# Patient Record
Sex: FEMALE | Race: WHITE | NOT HISPANIC OR LATINO | Employment: FULL TIME | URBAN - METROPOLITAN AREA
[De-identification: names, ages, dates, MRNs, and addresses within clinical notes are randomized per-mention and may not be internally consistent; named-entity substitution may affect disease eponyms.]

---

## 2023-04-24 ENCOUNTER — OFFICE VISIT (OUTPATIENT)
Dept: PRIMARY CARE CLINIC | Facility: CLINIC | Age: 23
End: 2023-04-24
Payer: COMMERCIAL

## 2023-04-24 ENCOUNTER — LAB VISIT (OUTPATIENT)
Dept: LAB | Facility: HOSPITAL | Age: 23
End: 2023-04-24
Attending: STUDENT IN AN ORGANIZED HEALTH CARE EDUCATION/TRAINING PROGRAM
Payer: COMMERCIAL

## 2023-04-24 VITALS
WEIGHT: 157.19 LBS | DIASTOLIC BLOOD PRESSURE: 74 MMHG | HEART RATE: 67 BPM | HEIGHT: 68 IN | BODY MASS INDEX: 23.82 KG/M2 | OXYGEN SATURATION: 99 % | SYSTOLIC BLOOD PRESSURE: 120 MMHG | TEMPERATURE: 98 F

## 2023-04-24 DIAGNOSIS — M25.542 ARTHRALGIA OF BOTH HANDS: Chronic | ICD-10-CM

## 2023-04-24 DIAGNOSIS — G43.009 MIGRAINE WITHOUT AURA AND WITHOUT STATUS MIGRAINOSUS, NOT INTRACTABLE: Chronic | ICD-10-CM

## 2023-04-24 DIAGNOSIS — R41.89 BRAIN FOG: ICD-10-CM

## 2023-04-24 DIAGNOSIS — M25.541 ARTHRALGIA OF BOTH HANDS: Chronic | ICD-10-CM

## 2023-04-24 DIAGNOSIS — G50.0 TRIGEMINAL NEURALGIA: ICD-10-CM

## 2023-04-24 DIAGNOSIS — A69.20 LYME DISEASE: ICD-10-CM

## 2023-04-24 DIAGNOSIS — L40.9 PSORIASIS: Chronic | ICD-10-CM

## 2023-04-24 DIAGNOSIS — F32.A ANXIETY AND DEPRESSION: ICD-10-CM

## 2023-04-24 DIAGNOSIS — Z76.89 ESTABLISHING CARE WITH NEW DOCTOR, ENCOUNTER FOR: ICD-10-CM

## 2023-04-24 DIAGNOSIS — R21 RASH AND NONSPECIFIC SKIN ERUPTION: ICD-10-CM

## 2023-04-24 DIAGNOSIS — Z76.89 ESTABLISHING CARE WITH NEW DOCTOR, ENCOUNTER FOR: Primary | ICD-10-CM

## 2023-04-24 DIAGNOSIS — F41.9 ANXIETY AND DEPRESSION: ICD-10-CM

## 2023-04-24 DIAGNOSIS — R11.2 NAUSEA AND VOMITING, UNSPECIFIED VOMITING TYPE: ICD-10-CM

## 2023-04-24 DIAGNOSIS — Z83.2 FAMILY HISTORY OF AUTOIMMUNE DISORDER: ICD-10-CM

## 2023-04-24 DIAGNOSIS — M25.50 ARTHRALGIA, UNSPECIFIED JOINT: ICD-10-CM

## 2023-04-24 DIAGNOSIS — Z79.899 OTHER LONG TERM (CURRENT) DRUG THERAPY: ICD-10-CM

## 2023-04-24 DIAGNOSIS — A69.23 LYME ARTHRITIS: ICD-10-CM

## 2023-04-24 DIAGNOSIS — L40.50 PSORIATIC ARTHRITIS: ICD-10-CM

## 2023-04-24 DIAGNOSIS — Z86.19 H/O LYME DISEASE: ICD-10-CM

## 2023-04-24 LAB
25(OH)D3+25(OH)D2 SERPL-MCNC: 28 NG/ML (ref 30–96)
ALBUMIN SERPL BCP-MCNC: 4.3 G/DL (ref 3.5–5.2)
ALP SERPL-CCNC: 53 U/L (ref 55–135)
ALT SERPL W/O P-5'-P-CCNC: 14 U/L (ref 10–44)
ANION GAP SERPL CALC-SCNC: 7 MMOL/L (ref 8–16)
AST SERPL-CCNC: 15 U/L (ref 10–40)
BASOPHILS # BLD AUTO: 0.05 K/UL (ref 0–0.2)
BASOPHILS NFR BLD: 0.8 % (ref 0–1.9)
BILIRUB SERPL-MCNC: 0.3 MG/DL (ref 0.1–1)
BUN SERPL-MCNC: 15 MG/DL (ref 6–20)
CALCIUM SERPL-MCNC: 9.8 MG/DL (ref 8.7–10.5)
CCP AB SER IA-ACNC: <0.5 U/ML
CHLORIDE SERPL-SCNC: 106 MMOL/L (ref 95–110)
CHOLEST SERPL-MCNC: 182 MG/DL (ref 120–199)
CHOLEST/HDLC SERPL: 2.6 {RATIO} (ref 2–5)
CO2 SERPL-SCNC: 26 MMOL/L (ref 23–29)
CREAT SERPL-MCNC: 1 MG/DL (ref 0.5–1.4)
DIFFERENTIAL METHOD: ABNORMAL
EOSINOPHIL # BLD AUTO: 0.2 K/UL (ref 0–0.5)
EOSINOPHIL NFR BLD: 3.3 % (ref 0–8)
ERYTHROCYTE [DISTWIDTH] IN BLOOD BY AUTOMATED COUNT: 12.1 % (ref 11.5–14.5)
EST. GFR  (NO RACE VARIABLE): >60 ML/MIN/1.73 M^2
ESTIMATED AVG GLUCOSE: 100 MG/DL (ref 68–131)
GLUCOSE SERPL-MCNC: 87 MG/DL (ref 70–110)
HBA1C MFR BLD: 5.1 % (ref 4–5.6)
HCT VFR BLD AUTO: 40.5 % (ref 37–48.5)
HCV AB SERPL QL IA: NORMAL
HDLC SERPL-MCNC: 69 MG/DL (ref 40–75)
HDLC SERPL: 37.9 % (ref 20–50)
HGB BLD-MCNC: 13.4 G/DL (ref 12–16)
HIV 1+2 AB+HIV1 P24 AG SERPL QL IA: NORMAL
IMM GRANULOCYTES # BLD AUTO: 0.03 K/UL (ref 0–0.04)
IMM GRANULOCYTES NFR BLD AUTO: 0.5 % (ref 0–0.5)
LDLC SERPL CALC-MCNC: 97.6 MG/DL (ref 63–159)
LYMPHOCYTES # BLD AUTO: 1.3 K/UL (ref 1–4.8)
LYMPHOCYTES NFR BLD: 20.1 % (ref 18–48)
MCH RBC QN AUTO: 30.2 PG (ref 27–31)
MCHC RBC AUTO-ENTMCNC: 33.1 G/DL (ref 32–36)
MCV RBC AUTO: 91 FL (ref 82–98)
MONOCYTES # BLD AUTO: 0.8 K/UL (ref 0.3–1)
MONOCYTES NFR BLD: 12.1 % (ref 4–15)
NEUTROPHILS # BLD AUTO: 4 K/UL (ref 1.8–7.7)
NEUTROPHILS NFR BLD: 63.2 % (ref 38–73)
NONHDLC SERPL-MCNC: 113 MG/DL
NRBC BLD-RTO: 0 /100 WBC
PLATELET # BLD AUTO: 215 K/UL (ref 150–450)
PMV BLD AUTO: 13.2 FL (ref 9.2–12.9)
POTASSIUM SERPL-SCNC: 4.4 MMOL/L (ref 3.5–5.1)
PROT SERPL-MCNC: 7 G/DL (ref 6–8.4)
RBC # BLD AUTO: 4.43 M/UL (ref 4–5.4)
RHEUMATOID FACT SERPL-ACNC: <13 IU/ML (ref 0–15)
SODIUM SERPL-SCNC: 139 MMOL/L (ref 136–145)
T4 FREE SERPL-MCNC: 0.91 NG/DL (ref 0.71–1.51)
TRIGL SERPL-MCNC: 77 MG/DL (ref 30–150)
TSH SERPL DL<=0.005 MIU/L-ACNC: 4.68 UIU/ML (ref 0.4–4)
WBC # BLD AUTO: 6.36 K/UL (ref 3.9–12.7)

## 2023-04-24 PROCEDURE — 36415 COLL VENOUS BLD VENIPUNCTURE: CPT | Mod: PN | Performed by: STUDENT IN AN ORGANIZED HEALTH CARE EDUCATION/TRAINING PROGRAM

## 2023-04-24 PROCEDURE — 3008F BODY MASS INDEX DOCD: CPT | Mod: CPTII,S$GLB,, | Performed by: STUDENT IN AN ORGANIZED HEALTH CARE EDUCATION/TRAINING PROGRAM

## 2023-04-24 PROCEDURE — 82306 VITAMIN D 25 HYDROXY: CPT | Performed by: STUDENT IN AN ORGANIZED HEALTH CARE EDUCATION/TRAINING PROGRAM

## 2023-04-24 PROCEDURE — 84443 ASSAY THYROID STIM HORMONE: CPT | Performed by: STUDENT IN AN ORGANIZED HEALTH CARE EDUCATION/TRAINING PROGRAM

## 2023-04-24 PROCEDURE — 3008F PR BODY MASS INDEX (BMI) DOCUMENTED: ICD-10-PCS | Mod: CPTII,S$GLB,, | Performed by: STUDENT IN AN ORGANIZED HEALTH CARE EDUCATION/TRAINING PROGRAM

## 2023-04-24 PROCEDURE — 85025 COMPLETE CBC W/AUTO DIFF WBC: CPT | Performed by: STUDENT IN AN ORGANIZED HEALTH CARE EDUCATION/TRAINING PROGRAM

## 2023-04-24 PROCEDURE — 1159F MED LIST DOCD IN RCRD: CPT | Mod: CPTII,S$GLB,, | Performed by: STUDENT IN AN ORGANIZED HEALTH CARE EDUCATION/TRAINING PROGRAM

## 2023-04-24 PROCEDURE — 1160F PR REVIEW ALL MEDS BY PRESCRIBER/CLIN PHARMACIST DOCUMENTED: ICD-10-PCS | Mod: CPTII,S$GLB,, | Performed by: STUDENT IN AN ORGANIZED HEALTH CARE EDUCATION/TRAINING PROGRAM

## 2023-04-24 PROCEDURE — 3078F DIAST BP <80 MM HG: CPT | Mod: CPTII,S$GLB,, | Performed by: STUDENT IN AN ORGANIZED HEALTH CARE EDUCATION/TRAINING PROGRAM

## 2023-04-24 PROCEDURE — 84439 ASSAY OF FREE THYROXINE: CPT | Performed by: STUDENT IN AN ORGANIZED HEALTH CARE EDUCATION/TRAINING PROGRAM

## 2023-04-24 PROCEDURE — 86803 HEPATITIS C AB TEST: CPT | Performed by: STUDENT IN AN ORGANIZED HEALTH CARE EDUCATION/TRAINING PROGRAM

## 2023-04-24 PROCEDURE — 86225 DNA ANTIBODY NATIVE: CPT | Performed by: STUDENT IN AN ORGANIZED HEALTH CARE EDUCATION/TRAINING PROGRAM

## 2023-04-24 PROCEDURE — 86038 ANTINUCLEAR ANTIBODIES: CPT | Performed by: STUDENT IN AN ORGANIZED HEALTH CARE EDUCATION/TRAINING PROGRAM

## 2023-04-24 PROCEDURE — 87389 HIV-1 AG W/HIV-1&-2 AB AG IA: CPT | Performed by: STUDENT IN AN ORGANIZED HEALTH CARE EDUCATION/TRAINING PROGRAM

## 2023-04-24 PROCEDURE — 86431 RHEUMATOID FACTOR QUANT: CPT | Performed by: STUDENT IN AN ORGANIZED HEALTH CARE EDUCATION/TRAINING PROGRAM

## 2023-04-24 PROCEDURE — 3074F PR MOST RECENT SYSTOLIC BLOOD PRESSURE < 130 MM HG: ICD-10-PCS | Mod: CPTII,S$GLB,, | Performed by: STUDENT IN AN ORGANIZED HEALTH CARE EDUCATION/TRAINING PROGRAM

## 2023-04-24 PROCEDURE — 3078F PR MOST RECENT DIASTOLIC BLOOD PRESSURE < 80 MM HG: ICD-10-PCS | Mod: CPTII,S$GLB,, | Performed by: STUDENT IN AN ORGANIZED HEALTH CARE EDUCATION/TRAINING PROGRAM

## 2023-04-24 PROCEDURE — 1160F RVW MEDS BY RX/DR IN RCRD: CPT | Mod: CPTII,S$GLB,, | Performed by: STUDENT IN AN ORGANIZED HEALTH CARE EDUCATION/TRAINING PROGRAM

## 2023-04-24 PROCEDURE — 80053 COMPREHEN METABOLIC PANEL: CPT | Performed by: STUDENT IN AN ORGANIZED HEALTH CARE EDUCATION/TRAINING PROGRAM

## 2023-04-24 PROCEDURE — 99999 PR PBB SHADOW E&M-NEW PATIENT-LVL V: ICD-10-PCS | Mod: PBBFAC,,, | Performed by: STUDENT IN AN ORGANIZED HEALTH CARE EDUCATION/TRAINING PROGRAM

## 2023-04-24 PROCEDURE — 80061 LIPID PANEL: CPT | Performed by: STUDENT IN AN ORGANIZED HEALTH CARE EDUCATION/TRAINING PROGRAM

## 2023-04-24 PROCEDURE — 99999 PR PBB SHADOW E&M-NEW PATIENT-LVL V: CPT | Mod: PBBFAC,,, | Performed by: STUDENT IN AN ORGANIZED HEALTH CARE EDUCATION/TRAINING PROGRAM

## 2023-04-24 PROCEDURE — 3074F SYST BP LT 130 MM HG: CPT | Mod: CPTII,S$GLB,, | Performed by: STUDENT IN AN ORGANIZED HEALTH CARE EDUCATION/TRAINING PROGRAM

## 2023-04-24 PROCEDURE — 83036 HEMOGLOBIN GLYCOSYLATED A1C: CPT | Performed by: STUDENT IN AN ORGANIZED HEALTH CARE EDUCATION/TRAINING PROGRAM

## 2023-04-24 PROCEDURE — 1159F PR MEDICATION LIST DOCUMENTED IN MEDICAL RECORD: ICD-10-PCS | Mod: CPTII,S$GLB,, | Performed by: STUDENT IN AN ORGANIZED HEALTH CARE EDUCATION/TRAINING PROGRAM

## 2023-04-24 PROCEDURE — 99205 OFFICE O/P NEW HI 60 MIN: CPT | Mod: S$GLB,,, | Performed by: STUDENT IN AN ORGANIZED HEALTH CARE EDUCATION/TRAINING PROGRAM

## 2023-04-24 PROCEDURE — 99205 PR OFFICE/OUTPT VISIT, NEW, LEVL V, 60-74 MIN: ICD-10-PCS | Mod: S$GLB,,, | Performed by: STUDENT IN AN ORGANIZED HEALTH CARE EDUCATION/TRAINING PROGRAM

## 2023-04-24 PROCEDURE — 86200 CCP ANTIBODY: CPT | Performed by: STUDENT IN AN ORGANIZED HEALTH CARE EDUCATION/TRAINING PROGRAM

## 2023-04-24 RX ORDER — RISANKIZUMAB-RZAA 150 MG/ML
INJECTION SUBCUTANEOUS
COMMUNITY

## 2023-04-24 RX ORDER — SERTRALINE HYDROCHLORIDE 200 MG/1
CAPSULE ORAL
COMMUNITY

## 2023-04-24 RX ORDER — SERTRALINE HYDROCHLORIDE 50 MG/1
50 TABLET, FILM COATED ORAL
COMMUNITY
Start: 2023-01-09 | End: 2024-02-05 | Stop reason: SDUPTHER

## 2023-04-24 NOTE — PROGRESS NOTES
Kiya Renae  2000        Subjective     Chief Complaint: Est Care    History of Present Illness:  Ms. Kiya Renae is a 23 y.o. female who presents to clinic for est care.    Back in 2019 was diagnosed Shingles at 19 yrs old. Also trigeminal neuralgia, also had seizure from Lyrica. Had at same time as Flu shot. Was told by PCP in [place] had Lyme Disease.     Thinks she developed Lyme summer of 2019. Does live in [place] areas of [facility]. Was given doxycycline and minocycline. Was on for about 2 years. Was having chronic inflammation.     Was having joint pain in hands. Seeing Rheum in [facility]. Also was told psoriasis and +psoriatic arthritis. On Skyrizi Q10 weeks for joint pain.    Does have Lupus in family. Mom with Rheumatoid, psoriasis, recurrent miscarriages.    Nausea chronic now. Worse after exercise.   Also with daily migraines, left-sided migraines.   + burning sensation in neck/ spine.  +throbbing hand pain  +vision changes  +rash behind ears, where her psoriasis  +skin tags  +brain fog    Does take Nurtec few times a month. Has not seen Neurology.   Reports head CT done few years ago in 2020, was told negative.     Review of Systems   Constitutional:  Positive for malaise/fatigue. Negative for chills and fever.   HENT:  Negative for congestion and sore throat.    Respiratory:  Negative for cough.    Cardiovascular:  Negative for leg swelling.   Gastrointestinal:  Positive for nausea and vomiting.   Genitourinary:  Negative for dysuria.   Musculoskeletal:  Positive for joint pain and myalgias.   Skin:  Positive for rash.   Neurological:  Positive for headaches. Negative for sensory change and speech change.   Psychiatric/Behavioral:  The patient is not nervous/anxious.       PAST HISTORY:     Past Medical History:   Diagnosis Date    Joint pain     Lyme disease, unspecified     Psoriasis        History reviewed. No pertinent surgical history.    Family History   Problem Relation Age of  "Onset    Alcohol abuse Mother     Asthma Mother     Rheum arthritis Mother     Psoriasis Mother     Alcohol abuse Father     Migraines Father     Diabetes Maternal Grandmother     Heart failure Maternal Grandfather     Breast cancer Paternal Grandmother     Heart attack Paternal Grandmother     Breast cancer Paternal Aunt     Lupus Paternal Aunt        Social History     Socioeconomic History    Marital status: Single   Tobacco Use    Smoking status: Never     Passive exposure: Never    Smokeless tobacco: Never       MEDICATIONS & ALLERGIES:     Current Outpatient Medications on File Prior to Visit   Medication Sig    risankizumab-rzaa (SKYRIZI) 150 mg/mL PnIj     sertraline (ZOLOFT) 100 MG tablet     sertraline (ZOLOFT) 50 MG tablet Take 50 mg by mouth.     No current facility-administered medications on file prior to visit.       Review of patient's allergies indicates:   Allergen Reactions    Azithromycin Hives, Rash and Swelling       OBJECTIVE:     Vital Signs:  Vitals:    04/24/23 0804   BP: 120/74   BP Location: Right arm   Patient Position: Sitting   Pulse: 67   Temp: 98.2 °F (36.8 °C)   TempSrc: Oral   SpO2: 99%   Weight: 71.3 kg (157 lb 3 oz)   Height: 5' 8" (1.727 m)       Body mass index is 23.9 kg/m².     Physical Exam:  Physical Exam  Vitals and nursing note reviewed.   Constitutional:       General: She is not in acute distress.     Appearance: Normal appearance. She is not ill-appearing, toxic-appearing or diaphoretic.   HENT:      Head: Normocephalic and atraumatic.   Eyes:      General: No scleral icterus.     Conjunctiva/sclera: Conjunctivae normal.   Cardiovascular:      Rate and Rhythm: Normal rate and regular rhythm.      Pulses: Normal pulses.      Heart sounds: No murmur heard.  Pulmonary:      Effort: Pulmonary effort is normal. No respiratory distress.      Breath sounds: Normal breath sounds. No wheezing.   Abdominal:      General: There is no distension.   Musculoskeletal:         " General: No swelling or tenderness.      Right lower leg: No edema.      Left lower leg: No edema.   Skin:     General: Skin is warm and dry.      Findings: No erythema.      Comments: Few small spots on left upper shoulder, right lower back at bra line, acne-like but no oozing/crusting   Neurological:      Mental Status: She is alert and oriented to person, place, and time.      Gait: Gait normal.      Comments: No swelling or erythema noted of bilateral MCP or DIP joints   Psychiatric:         Mood and Affect: Mood and affect normal.         Behavior: Behavior normal.          Laboratory  No results found for: WBC, HGB, HCT, MCV, PLT  No results found for: GLU, NA, K, CL, CO2, BUN, CREATININE, CALCIUM, MG  No results found for: INR, PROTIME  No results found for: HGBA1C        Health Maintenance         Date Due Completion Date    Hepatitis C Screening Never done ---    Lipid Panel Never done ---    COVID-19 Vaccine (1) Never done ---    HPV Vaccines (1 - 2-dose series) Never done ---    HIV Screening Never done ---    TETANUS VACCINE Never done ---    Pap Smear Never done ---    Influenza Vaccine (1) Never done ---              ASSESSMENT & PLAN:   Ms. Kiya Renae is a 23 y.o. female who was seen today in clinic for est care.    Extensive medical history that was done in [place].  Patient reports diagnosed with Lyme disease back in 2019.  Diagnosed after presenting with shingles and trigeminal neuralgia.  Reports with feeling normal prior to that.  [place]    Now with joint pain, brain fog, nausea/vomiting, migraines, vision changes, rash.    Would like to get established with Rheumatology in [place].  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  We will try and send us records from [place]. Sees Lyme specialist in [place].    She is currently on a biologic (Skyrizi) started by Rheumatology for psoriatic arthritis versus chronic Lyme/joint pain.   Reports mom with rheumatoid arthritis and psoriasis.  Also has lupus in the family.  Reports having a positive MELYSSA but unclear if she has lupus.  Was told her Rheumatologist was considering Plaquenil?    Also takes Nurtec prn. for migraines.  Has not seen migraine specialist.  Also with chronic nausea.  Does not appear to have seen GI before.  Unknown if related to her medications or Lyme?    Will refer to Rheumatology here as well as Dermatology for her rash/psoriasis/psoriatic arthritis/joint pain.    Will also have her establish with Neurology for her migraines, brain fog, trigeminal neuralgia.  Reports seizures with Lyrica.  Also tried gabapentin.    Continued workup for nausea.  Consider adrenal insufficiency.  Will test if workup thus far negative.  Consider GI for EGD.  Consider H pylori testing.    Currently on Zoloft 150 mg.    Continue follow-up with virtual visit pending labs, may need to refer to multiple specialists.      1. Establishing care with new doctor, encounter for  -     CBC Auto Differential; Future; Expected date: 04/24/2023  -     Comprehensive Metabolic Panel; Future; Expected date: 04/24/2023  -     Hemoglobin A1C; Future; Expected date: 04/24/2023  -     Lipid Panel; Future; Expected date: 04/24/2023  -     TSH; Future; Expected date: 04/24/2023  -     Vitamin D; Future; Expected date: 04/24/2023  -     Hepatitis C Antibody; Future; Expected date: 04/24/2023  -     HIV 1/2 Ag/Ab (4th Gen); Future; Expected date: 04/24/2023    2. Psoriatic arthritis  -     CBC Auto Differential; Future; Expected date: 04/24/2023    3. Psoriasis  -     CBC Auto Differential; Future; Expected date: 04/24/2023  -     Comprehensive Metabolic Panel; Future; Expected date: 04/24/2023  -     Hemoglobin A1C; Future; Expected date: 04/24/2023  -     Lipid Panel; Future; Expected date: 04/24/2023  -     TSH; Future; Expected date: 04/24/2023  -     Vitamin D; Future; Expected date: 04/24/2023  -     Hepatitis C  Antibody; Future; Expected date: 04/24/2023  -     HIV 1/2 Ag/Ab (4th Gen); Future; Expected date: 04/24/2023  -     MELYSSA; Future; Expected date: 04/24/2023  -     ANTI-DNA ANTIBODY, DOUBLE-STRANDED; Future; Expected date: 04/24/2023  -     Ambulatory referral/consult to Rheumatology; Future; Expected date: 04/24/2023  -     RHEUMATOID FACTOR; Future; Expected date: 04/24/2023  -     CYCLIC CITRUL PEPTIDE ANTIBODY, IGG; Future; Expected date: 04/24/2023  -     Ambulatory referral/consult to Dermatology; Future; Expected date: 05/01/2023    4. H/o Lyme disease  -     CBC Auto Differential; Future; Expected date: 04/24/2023  -     Comprehensive Metabolic Panel; Future; Expected date: 04/24/2023  -     Hemoglobin A1C; Future; Expected date: 04/24/2023  -     Lipid Panel; Future; Expected date: 04/24/2023  -     TSH; Future; Expected date: 04/24/2023  -     Vitamin D; Future; Expected date: 04/24/2023  -     Hepatitis C Antibody; Future; Expected date: 04/24/2023  -     HIV 1/2 Ag/Ab (4th Gen); Future; Expected date: 04/24/2023  -     MELYSSA; Future; Expected date: 04/24/2023  -     ANTI-DNA ANTIBODY, DOUBLE-STRANDED; Future; Expected date: 04/24/2023  -     Ambulatory referral/consult to Rheumatology; Future; Expected date: 04/24/2023  -     RHEUMATOID FACTOR; Future; Expected date: 04/24/2023  -     CYCLIC CITRUL PEPTIDE ANTIBODY, IGG; Future; Expected date: 04/24/2023  -     Ambulatory referral/consult to Neurology; Future; Expected date: 05/01/2023  -     Ambulatory referral/consult to Dermatology; Future; Expected date: 05/01/2023    5. Lyme arthritis    6. Arthralgia of both hands  -     CBC Auto Differential; Future; Expected date: 04/24/2023  -     Comprehensive Metabolic Panel; Future; Expected date: 04/24/2023  -     Hemoglobin A1C; Future; Expected date: 04/24/2023  -     Lipid Panel; Future; Expected date: 04/24/2023  -     TSH; Future; Expected date: 04/24/2023  -     Vitamin D; Future; Expected date:  04/24/2023  -     Hepatitis C Antibody; Future; Expected date: 04/24/2023  -     HIV 1/2 Ag/Ab (4th Gen); Future; Expected date: 04/24/2023  -     MELYSSA; Future; Expected date: 04/24/2023  -     ANTI-DNA ANTIBODY, DOUBLE-STRANDED; Future; Expected date: 04/24/2023  -     Ambulatory referral/consult to Rheumatology; Future; Expected date: 04/24/2023  -     RHEUMATOID FACTOR; Future; Expected date: 04/24/2023  -     CYCLIC CITRUL PEPTIDE ANTIBODY, IGG; Future; Expected date: 04/24/2023    7. Migraine without aura and without status migrainosus, not intractable  -     CBC Auto Differential; Future; Expected date: 04/24/2023  -     Comprehensive Metabolic Panel; Future; Expected date: 04/24/2023  -     Hemoglobin A1C; Future; Expected date: 04/24/2023  -     Lipid Panel; Future; Expected date: 04/24/2023  -     TSH; Future; Expected date: 04/24/2023  -     Vitamin D; Future; Expected date: 04/24/2023  -     Hepatitis C Antibody; Future; Expected date: 04/24/2023  -     HIV 1/2 Ag/Ab (4th Gen); Future; Expected date: 04/24/2023  -     MELYSSA; Future; Expected date: 04/24/2023  -     ANTI-DNA ANTIBODY, DOUBLE-STRANDED; Future; Expected date: 04/24/2023  -     RHEUMATOID FACTOR; Future; Expected date: 04/24/2023  -     CYCLIC CITRUL PEPTIDE ANTIBODY, IGG; Future; Expected date: 04/24/2023  -     Ambulatory referral/consult to Neurology; Future; Expected date: 05/01/2023    8. Nausea and vomiting, unspecified vomiting type    9. Family history of autoimmune disorder    10. Other long term (current) drug therapy    11. Brain fog  -     Ambulatory referral/consult to Neurology; Future; Expected date: 05/01/2023    12. Trigeminal neuralgia    13. Rash and nonspecific skin eruption    14. Anxiety and depression  -     CBC Auto Differential; Future; Expected date: 04/24/2023  -     TSH; Future; Expected date: 04/24/2023           RTC in 2 weeks with buck Shell MD  Internal Medicine       Time spent in the evaluation and  management of this patient exceeded 60min and greater than 50% of this time was in face-to-face time with the patient on day of the clinic visit.   This includes face-to-face time and non face-to-face time and includes the following:  --preparing to see the patient (eg, obtaining and/or reviewing old records such as, when applicable, primary care notes, specialist notes, hospital notes, review of laboratory tests, and/or radiographic and/or cardiology or other studies)  --performing a medically appropriate review of systems and examination and/or evaluation  --reviewing and independently interpreting results (not separately reported; eg, lab results) and communicating results to the patient and/or family/caregiver  --placing orders and/or reviewing other physician's orders which can both include medications, laboratory studies, radiographic studies, procedures, referrals etcetera and   --counseling and educating the patient and/or family member/caregiver regarding the treatment plan  --documentation of the visit in the electronic health record  --communicating with other health care providers regarding referrals, studies, follow-up, etc

## 2023-04-25 LAB
ANA SER QL IF: NORMAL
DSDNA AB SER-ACNC: NORMAL [IU]/ML

## 2023-04-26 ENCOUNTER — OFFICE VISIT (OUTPATIENT)
Dept: DERMATOLOGY | Facility: CLINIC | Age: 23
End: 2023-04-26
Payer: COMMERCIAL

## 2023-04-26 DIAGNOSIS — L40.0 PLAQUE PSORIASIS: ICD-10-CM

## 2023-04-26 DIAGNOSIS — L70.9 ACNE, UNSPECIFIED ACNE TYPE: ICD-10-CM

## 2023-04-26 PROCEDURE — 99999 PR PBB SHADOW E&M-EST. PATIENT-LVL III: ICD-10-PCS | Mod: PBBFAC,,, | Performed by: STUDENT IN AN ORGANIZED HEALTH CARE EDUCATION/TRAINING PROGRAM

## 2023-04-26 PROCEDURE — 1160F PR REVIEW ALL MEDS BY PRESCRIBER/CLIN PHARMACIST DOCUMENTED: ICD-10-PCS | Mod: CPTII,S$GLB,, | Performed by: STUDENT IN AN ORGANIZED HEALTH CARE EDUCATION/TRAINING PROGRAM

## 2023-04-26 PROCEDURE — 1159F PR MEDICATION LIST DOCUMENTED IN MEDICAL RECORD: ICD-10-PCS | Mod: CPTII,S$GLB,, | Performed by: STUDENT IN AN ORGANIZED HEALTH CARE EDUCATION/TRAINING PROGRAM

## 2023-04-26 PROCEDURE — 99999 PR PBB SHADOW E&M-EST. PATIENT-LVL III: CPT | Mod: PBBFAC,,, | Performed by: STUDENT IN AN ORGANIZED HEALTH CARE EDUCATION/TRAINING PROGRAM

## 2023-04-26 PROCEDURE — 1159F MED LIST DOCD IN RCRD: CPT | Mod: CPTII,S$GLB,, | Performed by: STUDENT IN AN ORGANIZED HEALTH CARE EDUCATION/TRAINING PROGRAM

## 2023-04-26 PROCEDURE — 99204 PR OFFICE/OUTPT VISIT, NEW, LEVL IV, 45-59 MIN: ICD-10-PCS | Mod: S$GLB,,, | Performed by: STUDENT IN AN ORGANIZED HEALTH CARE EDUCATION/TRAINING PROGRAM

## 2023-04-26 PROCEDURE — 3044F HG A1C LEVEL LT 7.0%: CPT | Mod: CPTII,S$GLB,, | Performed by: STUDENT IN AN ORGANIZED HEALTH CARE EDUCATION/TRAINING PROGRAM

## 2023-04-26 PROCEDURE — 3044F PR MOST RECENT HEMOGLOBIN A1C LEVEL <7.0%: ICD-10-PCS | Mod: CPTII,S$GLB,, | Performed by: STUDENT IN AN ORGANIZED HEALTH CARE EDUCATION/TRAINING PROGRAM

## 2023-04-26 PROCEDURE — 99204 OFFICE O/P NEW MOD 45 MIN: CPT | Mod: S$GLB,,, | Performed by: STUDENT IN AN ORGANIZED HEALTH CARE EDUCATION/TRAINING PROGRAM

## 2023-04-26 PROCEDURE — 1160F RVW MEDS BY RX/DR IN RCRD: CPT | Mod: CPTII,S$GLB,, | Performed by: STUDENT IN AN ORGANIZED HEALTH CARE EDUCATION/TRAINING PROGRAM

## 2023-04-26 RX ORDER — TRETINOIN 0.5 MG/G
CREAM TOPICAL NIGHTLY
Qty: 20 G | Refills: 3 | Status: SHIPPED | OUTPATIENT
Start: 2023-04-26 | End: 2023-08-31 | Stop reason: SDUPTHER

## 2023-04-26 RX ORDER — DAPSONE 75 MG/G
1 GEL TOPICAL DAILY
Qty: 60 G | Refills: 3 | Status: SHIPPED | OUTPATIENT
Start: 2023-04-26 | End: 2023-06-27

## 2023-04-26 RX ORDER — CLOBETASOL PROPIONATE 0.46 MG/ML
SOLUTION TOPICAL DAILY
Qty: 50 ML | Refills: 2 | Status: SHIPPED | OUTPATIENT
Start: 2023-04-26

## 2023-04-26 RX ORDER — FLUOCINOLONE ACETONIDE 0.11 MG/ML
1 OIL AURICULAR (OTIC) DAILY
Qty: 20 ML | Refills: 2 | Status: SHIPPED | OUTPATIENT
Start: 2023-04-26

## 2023-04-26 RX ORDER — PIMECROLIMUS 10 MG/G
CREAM TOPICAL 2 TIMES DAILY
Qty: 60 G | Refills: 3 | Status: SHIPPED | OUTPATIENT
Start: 2023-04-26 | End: 2023-06-27

## 2023-04-26 NOTE — PATIENT INSTRUCTIONS
Salicylic acid shampoo (neutrogenia T/sal)  Or   Neutrogenia T/gel    _________________    ACNE TREATMENT PLAN    FACE:  Morning:  Wash   Gentle, non-medicated wash   Benzoyl peroxide  __4__%  Salicylic acid cleanser  Other _____________________________    Apply   ___dapsone__________________to affected areas of ___x_ face _____ chest ____ back  _____________________ to affected areas of ____ face _____ chest ____ back    If dry, apply non-scented, non-comedogenic moisturizer of your choice to affected areas.  CeraVe AM Facial Moisturizing Lotion    Take ____________________________ by mouth.    Evening:  Wash   Gentle, non-medicated wash   Benzoyl peroxide  ____%  Salicylic acid cleanser  Other _____________________________    Apply   _____tretinoin________________to affected areas of __x__ face _____ chest ____ back  _____________________ to affected areas of ____ face _____ chest ____ back    If dry, apply non-scented, non-comedogenic moisturizer of your choice to affected areas.  CeraVe PM Facial Moisturizing Lotion    Take ____________________________ by mouth _________ times a day.      BODY:  Wash   Benzoyl peroxide  __10__%  Salicylic acid cleanser  Sulfur Soap  Other _____________________________  Apply   AHA/BHA (salicylic acid pad or spray) (replenix gly sal pads)  Other: _________________________    Gentle cleansers:  If skin is dry: Cetaphil or CeraVe hydrating facial cleanser  If skin is oily or heavy makeup: CeraVe foaming cleanser or LaRoche Toleriane foaming facial cleanser    Benzoyl Peroxide cleansers: These may be drying/irritating and bleach clothing/towels.   Neutrogena Stubborn Acne AM wash (2.5%)  CeraVe Acne Foaming Cleanser (4%) Clean & Clear Continuous Control (10%)  Panoxyl daily creamy wash (4%) or acne foaming wash (10%)     Salicylic acid cleansers  CeraVe Acne Control Cleanser (2% salicylic acid)  La Roche-Posay Effaclar Medicated Gel Acne Wash with Salicylic Acid (2% salicylic  acid)    AHA/BHA  Replenix Gly-Sal 10-2 clarifying pads ([facility])  Replenix Acne Solutions Gly/Sal 10-2 Acne Body Tracy ([employer])  QRx Labs Glycolic/Salicylic acid 10/2 Acne Control Pads ([employer])    Sulfur wash: Your doctor may prescribe this; if issues with insurance coverage, below is an over the counter version  Joesoef anti acne soap ([employer], 10% sulfur)    Retinoids:  A pea-sized amount of adapalene/tretinoin/tazarotene cream to your face every other night, increasing to every night if tolerated.    When applying topical medications to the face, use the 5-dot method. Take a small pea-sized amount and place dots in each of 5 locations of your face: mid-forehead, each cheek, nose, and chin. Then rub in. You should not see a film of the medication on your skin; if you do, you're probably using too much.  Medication needs to be placed on all areas of acne-prone skin for acne prevention  Do not wax your eyebrows or other facial areas while using these products  Do not use if pregnant or planning to become pregnant     Azelaic Acid:  Generally considered a safe acne treatment in pregnancy. Available as prescription (15-20% concentration) or over the counter (10% concentration). Helps with both acne and the dark spots left behind by acne.  The Ordinary Azelaic Acid 10% ([employer], [employer], [employer])    Moisturizers  Feel free to use moisturizer generously, since many acne medications can be drying.  Make sure that moisturizer (and anything else applied to the skin including makeup, sunscreen) is labelled non-comedogenic, which means it is less likely to clog your pores.    Less Dry: CeraVe Facial Moisturizing Lotion (AM with SPF and PM without)  More Dry: CeraVe Moisturizing Cream or CeraVe Skin Renewing Night cream  Other Options: LaRoche Posay Toleraine Ultra Face Moisturizer, First Aid Beauty Ultra Repair Cream    Doxycycline:  Take medication with food to avoid upset stomach. Try to avoid taking with dairy  products which inhibit absorption  Can increase sensitivity to sunburns so use sunscreen regularly if outdoors  Take at least 1 hour before bed (lying flat) and drink a full glass of water to ensure medication does not irritate the throat    OTHER ACNE TIPS:  Sun protection is recommended given many medications can cause increased sensitivity to the sun and to prevent/treat post-inflammatory hyperpigmentation (the dark spots left behind after a pimple) which are worsened by sun exposure.   Choose one that is broad spectrum (UVA/UVB coverage), SPF 30 or above, such as: Neutrogena, CeraVe AM cream, Aveeno, LaRoche    Acne medications do not show maximal improvement until after at least 6-8 weeks of continuous use.  Continue your treatment plan as instructed, even if you don't see immediate results.      Diet may play a role in acne - studies have shown that low fat dairy, whey protein, and high glycemic foods (simple carbs like sugary soda, white bread/pasta, sweets) may contribute to acne for some people.    If you play sports, try to wash right away when you are done. Also, pay attention to how your sports equipment (shoulder pads, helmet strap, etc.) might rub against your skin and be making your acne worse!    Makeup (if applicable):   Use non-comedogenic and/or mineral-based makeup brands such as Bare Minerals, Physician's Formula, Clinique, Christen Iredale, Lauar Edin, PUR minerals, Kasey Brown.   [employer]- Lorraine True Match, Revlon Colorstay Oil Free Foundation  Avoid- Covergirl, MAC liquid foundation.  You can search [employer] and searchàshopàmakeupàfaceàfoundationàoil-free    Hormonal acne:  Women may benefit from starting a combined oral contraceptive.  There are three that are FDA approved to treat acne: ortho tri-cyclen, estrostep, and yoko.  Spironolactone also may be used to treat hormonal acne.    Pregnancy  Most acne medications are not studied or specifically should NOT be used during  pregnancy. If you are pregnant or planning pregnancy, stop all medications and discuss with physician

## 2023-04-26 NOTE — PROGRESS NOTES
"  Subjective:      Patient ID:  Kiya Renae is a 23 y.o. female who presents for   Chief Complaint   Patient presents with    Psoriasis    Acne     Psoriasis - Initial  Affected locations: scalp.  Duration: many years.  Signs / symptoms: burning and itching  Treatments tried: skyrizi.    Acne - Initial  Affected locations: face    Problem List Items Addressed This Visit          Derm    Plaque psoriasis    Overview     - Has a diagnosis of psoriasis since at least 2021 but has a history of "dandruff" since middle school  - Only skin involvement has been scalp and ears  - It improved on skyrizi but still has some dandruff / scalp psoriasis and itching that flares right before next shot is due  - Use tea tree oil which she feels helps  - has psoriatic arthritis and is on skyrizi per rheumatology since 11/2022. Doing well           Relevant Medications    clobetasoL (TEMOVATE) 0.05 % external solution    fluocinolone acetonide oiL 0.01 % Drop    pimecrolimus (ELIDEL) 1 % cream       ID    H/o Lyme disease    Relevant Medications    tretinoin (RETIN-A) 0.05 % cream    dapsone (ACZONE) 7.5 % GlwP     For acne, using many over the counter topicals. Acne is currently flaring. Started in 20s    Review of Systems   Hematologic/Lymphatic: Does not bruise/bleed easily.     Objective:   Physical Exam   Constitutional: She appears well-developed and well-nourished. No distress.   Neurological: She is alert and oriented to person, place, and time. She is not disoriented.   Psychiatric: She has a normal mood and affect.   Skin:   Areas Examined (abnormalities noted in diagram):   Back Inspection Performed               Diagram Legend     Erythematous scaling macule/papule c/w actinic keratosis       Vascular papule c/w angioma      Pigmented verrucoid papule/plaque c/w seborrheic keratosis      Yellow umbilicated papule c/w sebaceous hyperplasia      Irregularly shaped tan macule c/w lentigo     1-2 mm smooth white papules " consistent with Milia      Movable subcutaneous cyst with punctum c/w epidermal inclusion cyst      Subcutaneous movable cyst c/w pilar cyst      Firm pink to brown papule c/w dermatofibroma      Pedunculated fleshy papule(s) c/w skin tag(s)      Evenly pigmented macule c/w junctional nevus     Mildly variegated pigmented, slightly irregular-bordered macule c/w mildly atypical nevus      Flesh colored to evenly pigmented papule c/w intradermal nevus       Pink pearly papule/plaque c/w basal cell carcinoma      Erythematous hyperkeratotic cursted plaque c/w SCC      Surgical scar with no sign of skin cancer recurrence      Open and closed comedones      Inflammatory papules and pustules      Verrucoid papule consistent consistent with wart     Erythematous eczematous patches and plaques     Dystrophic onycholytic nail with subungual debris c/w onychomycosis     Umbilicated papule    Erythematous-base heme-crusted tan verrucoid plaque consistent with inflamed seborrheic keratosis     Erythematous Silvery Scaling Plaque c/w Psoriasis     See annotation      Assessment / Plan:        Acne--Chronic condition, not at goal  - Papules and pustules with some deeper hormonal lesions on face. Mild on back  - For face--BPO 4% cleanser, dapsone 7.5 qam and tretinoin qpm. Counseled on use of retinoids and handout provided  - for back, BPo 10% wash and gly/sal pads or spray  - discussed other treatment options including spironolactone and doxy. Patient would like to use only topicals for now    -     tretinoin (RETIN-A) 0.05 % cream; Apply topically every evening. Start twice weekly and increase as tolerated. Pea sized amount to entire face.  Dispense: 20 g; Refill: 3  -     dapsone (ACZONE) 7.5 % GlwP; Apply 1 application topically once daily.  Dispense: 60 g; Refill: 3    Plaque psoriasis--only scalp involvement + PsA. Exam clear today but patient reports itching  - on skyrizi prescribed by rheum--continue  - scalp and ears  flare prior to timing for dose. Topicals as below  -     clobetasoL (TEMOVATE) 0.05 % external solution; Apply topically once daily. Use to affected areas for up to 2 weeks then take a 1 week break or decrease to 3 times weekly. Do not apply to groin or face  Dispense: 50 mL; Refill: 2  -     fluocinolone acetonide oiL 0.01 % Drop; Place 1 application in ear(s) once daily. Use to affected areas for up to 2 weeks then take a 1 week break or decrease to 3 times weekly. For use in ears  Dispense: 20 mL; Refill: 2  -     pimecrolimus (ELIDEL) 1 % cream; Apply topically 2 (two) times daily. Use for psoriasis on ears, face, neck. Not a steroid  Dispense: 60 g; Refill: 3         Follow up in about 2 months (around 6/26/2023).

## 2023-05-08 ENCOUNTER — OFFICE VISIT (OUTPATIENT)
Dept: PRIMARY CARE CLINIC | Facility: CLINIC | Age: 23
End: 2023-05-08
Payer: COMMERCIAL

## 2023-05-08 ENCOUNTER — PATIENT MESSAGE (OUTPATIENT)
Dept: PRIMARY CARE CLINIC | Facility: CLINIC | Age: 23
End: 2023-05-08

## 2023-05-08 DIAGNOSIS — Z83.2 FAMILY HISTORY OF AUTOIMMUNE DISORDER: ICD-10-CM

## 2023-05-08 DIAGNOSIS — R59.0 LAD (LYMPHADENOPATHY), AXILLARY: ICD-10-CM

## 2023-05-08 DIAGNOSIS — A69.23 LYME ARTHRITIS: ICD-10-CM

## 2023-05-08 DIAGNOSIS — R11.2 NAUSEA AND VOMITING, UNSPECIFIED VOMITING TYPE: Primary | ICD-10-CM

## 2023-05-08 DIAGNOSIS — L40.0 PLAQUE PSORIASIS: ICD-10-CM

## 2023-05-08 DIAGNOSIS — R42 DIZZINESS AND GIDDINESS: ICD-10-CM

## 2023-05-08 DIAGNOSIS — G43.009 MIGRAINE WITHOUT AURA AND WITHOUT STATUS MIGRAINOSUS, NOT INTRACTABLE: Chronic | ICD-10-CM

## 2023-05-08 DIAGNOSIS — R41.89 BRAIN FOG: ICD-10-CM

## 2023-05-08 DIAGNOSIS — E03.8 SUBCLINICAL HYPOTHYROIDISM: ICD-10-CM

## 2023-05-08 DIAGNOSIS — Z79.899 OTHER LONG TERM (CURRENT) DRUG THERAPY: ICD-10-CM

## 2023-05-08 PROBLEM — M25.542 ARTHRALGIA OF BOTH HANDS: Chronic | Status: RESOLVED | Noted: 2023-04-24 | Resolved: 2023-05-08

## 2023-05-08 PROBLEM — M25.541 ARTHRALGIA OF BOTH HANDS: Chronic | Status: RESOLVED | Noted: 2023-04-24 | Resolved: 2023-05-08

## 2023-05-08 PROCEDURE — 99214 OFFICE O/P EST MOD 30 MIN: CPT | Mod: 95,,, | Performed by: STUDENT IN AN ORGANIZED HEALTH CARE EDUCATION/TRAINING PROGRAM

## 2023-05-08 PROCEDURE — 3044F PR MOST RECENT HEMOGLOBIN A1C LEVEL <7.0%: ICD-10-PCS | Mod: CPTII,95,, | Performed by: STUDENT IN AN ORGANIZED HEALTH CARE EDUCATION/TRAINING PROGRAM

## 2023-05-08 PROCEDURE — 3044F HG A1C LEVEL LT 7.0%: CPT | Mod: CPTII,95,, | Performed by: STUDENT IN AN ORGANIZED HEALTH CARE EDUCATION/TRAINING PROGRAM

## 2023-05-08 PROCEDURE — 99214 PR OFFICE/OUTPT VISIT, EST, LEVL IV, 30-39 MIN: ICD-10-PCS | Mod: 95,,, | Performed by: STUDENT IN AN ORGANIZED HEALTH CARE EDUCATION/TRAINING PROGRAM

## 2023-05-08 RX ORDER — PANTOPRAZOLE SODIUM 40 MG/1
40 TABLET, DELAYED RELEASE ORAL DAILY
Qty: 30 TABLET | Refills: 3 | Status: SHIPPED | OUTPATIENT
Start: 2023-05-08

## 2023-05-08 RX ORDER — PANTOPRAZOLE SODIUM 40 MG/1
40 TABLET, DELAYED RELEASE ORAL DAILY
Qty: 30 TABLET | Refills: 3 | Status: SHIPPED | OUTPATIENT
Start: 2023-05-08 | End: 2023-05-08

## 2023-05-08 NOTE — PROGRESS NOTES
The patient location is: [place]  The chief complaint leading to consultation is: F/u     Visit type: audiovisual      Kiya Renae  2000        Subjective     Chief Complaint: Nausea    History of Present Illness:  Ms. Kiya Renae is a 23 y.o. female who presents for virtual visit for f/u.    Saw Derm, Neuro visit scheduled (migraines, trigeminal neuralgia, brain fog). Subclinical hypothyroidism. Has had this before as well per pt.     Rheum referral in, not yet scheduled. Seeing Rheum in [place] next week. Her boss here is a Lyme specialist. Advised having bartonella tested. Does have cat. Occasional armpit swelling. Left >right.      Nausea- still present. Worsening. Was having low and high BP intermittently in the past. Unsure of hypoglycemia, does not think an issue. Started worsening after foot puncture in shower, had tetanus shot in ED then.     +abdominal bloating. Sometimes worse with skyrizi injections. Has Zofran at home, taking it every other day.    Unsure if related to Lyme or medications.    From our visit 2 weeks ago:  Back in 2019 was diagnosed Shingles at 19 yrs old. Also trigeminal neuralgia, also had seizure from Lyrica. Had at same time as Flu shot. Was told by PCP in [place] had Lyme Disease.      Thinks she developed Lyme summer of 2019. [place] areas of [facility]. Was given doxycycline and minocycline. Was on for about 2 years. Was having chronic inflammation.      Was having joint pain in hands. Seeing Rheum in [place] [facility]. Also was told psoriasis and +psoriatic arthritis. On Skyrizi Q10 weeks for joint pain.     Does have Lupus in family. Mom with Rheumatoid, psoriasis, recurrent miscarriages.     Nausea chronic now. Worse after exercise.   Also with daily migraines, left-sided migraines.   + burning sensation in neck/ spine.  +throbbing hand pain  +vision changes  +rash behind ears, where her psoriasis  +skin tags  +brain fog     Does take Nurtec few times a month. Has not  seen Neurology.   Reports head CT done few years ago in 2020, was told negative.      Answers submitted by the patient for this visit:  Review of Systems Questionnaire (Submitted on 5/8/2023)  activity change: No  unexpected weight change: No  rhinorrhea: No  trouble swallowing: No  visual disturbance: Yes  chest tightness: No  polyuria: No  difficulty urinating: No  menstrual problem: No  joint swelling: No  arthralgias: Yes  confusion: Yes  dysphoric mood: No    Review of Systems   Constitutional:  Positive for malaise/fatigue. Negative for chills and fever.   HENT:  Negative for hearing loss.    Eyes:  Negative for discharge.   Respiratory:  Negative for wheezing.    Cardiovascular:  Negative for chest pain and palpitations.   Gastrointestinal:  Positive for vomiting. Negative for blood in stool, constipation and diarrhea.   Genitourinary:  Negative for dysuria and hematuria.   Musculoskeletal:  Positive for neck pain.   Neurological:  Positive for headaches. Negative for weakness.   Endo/Heme/Allergies:  Negative for polydipsia.      PAST HISTORY:     Past Medical History:   Diagnosis Date    Joint pain     Lyme disease, unspecified     Psoriasis        No past surgical history on file.    Family History   Problem Relation Age of Onset    Alcohol abuse Mother     Asthma Mother     Rheum arthritis Mother     Psoriasis Mother     Alcohol abuse Father     Migraines Father     Diabetes Maternal Grandmother     Heart failure Maternal Grandfather     Breast cancer Paternal Grandmother     Heart attack Paternal Grandmother     Breast cancer Paternal Aunt     Lupus Paternal Aunt          MEDICATIONS & ALLERGIES:     Current Outpatient Medications on File Prior to Visit   Medication Sig    clobetasoL (TEMOVATE) 0.05 % external solution Apply topically once daily. Use to affected areas for up to 2 weeks then take a 1 week break or decrease to 3 times weekly. Do not apply to groin or face    dapsone (ACZONE) 7.5 % GlwP  Apply 1 application topically once daily.    fluocinolone acetonide oiL 0.01 % Drop Place 1 application in ear(s) once daily. Use to affected areas for up to 2 weeks then take a 1 week break or decrease to 3 times weekly. For use in ears    pimecrolimus (ELIDEL) 1 % cream Apply topically 2 (two) times daily. Use for psoriasis on ears, face, neck. Not a steroid    risankizumab-rzaa (SKYRIZI) 150 mg/mL PnIj     sertraline (ZOLOFT) 100 MG tablet     sertraline (ZOLOFT) 50 MG tablet Take 50 mg by mouth.    tretinoin (RETIN-A) 0.05 % cream Apply topically every evening. Start twice weekly and increase as tolerated. Pea sized amount to entire face.     No current facility-administered medications on file prior to visit.       Review of patient's allergies indicates:   Allergen Reactions    Azithromycin Hives, Rash and Swelling       OBJECTIVE:Review of Systems   Constitutional:  Positive for malaise/fatigue. Negative for chills and fever.   HENT:  Negative for hearing loss.    Eyes:  Negative for discharge.   Respiratory:  Negative for wheezing.    Cardiovascular:  Negative for chest pain and palpitations.   Gastrointestinal:  Positive for vomiting. Negative for blood in stool, constipation and diarrhea.   Genitourinary:  Negative for dysuria and hematuria.   Musculoskeletal:  Positive for neck pain.   Neurological:  Positive for headaches. Negative for weakness.   Endo/Heme/Allergies:  Negative for polydipsia.      There is no height or weight on file to calculate BMI.     Physical Exam:  Physical Exam  Constitutional:       General: She is not in acute distress.     Appearance: Normal appearance. She is not ill-appearing, toxic-appearing or diaphoretic.      Comments: Limited 2/2 Virtual Exam   HENT:      Head: Normocephalic and atraumatic.   Eyes:      Conjunctiva/sclera: Conjunctivae normal.   Pulmonary:      Effort: Pulmonary effort is normal. No respiratory distress.   Neurological:      Mental Status: She is alert  and oriented to person, place, and time. Mental status is at baseline.   Psychiatric:         Mood and Affect: Mood normal.         Behavior: Behavior normal.          Laboratory  Lab Results   Component Value Date    WBC 6.36 04/24/2023    HGB 13.4 04/24/2023    HCT 40.5 04/24/2023    MCV 91 04/24/2023     04/24/2023     Lab Results   Component Value Date    GLU 87 04/24/2023     04/24/2023    K 4.4 04/24/2023     04/24/2023    CO2 26 04/24/2023    BUN 15 04/24/2023    CREATININE 1.0 04/24/2023    CALCIUM 9.8 04/24/2023     No results found for: INR, PROTIME  Lab Results   Component Value Date    HGBA1C 5.1 04/24/2023             ASSESSMENT & PLAN:   Ms. Kiya Renae is a 23 y.o. female who was seen today for f/u.      1. Nausea and vomiting, unspecified vomiting type  2. Subclinical hypothyroidism  -     CT Head Without Contrast; Future; Expected date: 05/08/2023  -     CORTISOL, 8AM; Future; Expected date: 05/08/2023  -     H. PYLORI ANTIBODY, IGG; Future; Expected date: 05/08/2023  -     THYROID PEROXIDASE ANTIBODY; Future; Expected date: 05/08/2023  -     Lead, Blood (Capillary); Future; Expected date: 05/08/2023  -     US Abdomen Complete; Future; Expected date: 05/08/2023  -     US Pelvis Complete Non OB; Future; Expected date: 05/08/2023  -     pantoprazole (PROTONIX) 40 MG tablet; Take 1 tablet (40 mg total) by mouth once daily.  Dispense: 30 tablet; Refill: 3  -     BARTONELLA, MOLECULAR DETECTION, PCR, BLOOD; Future; Expected date: 05/08/2023---trial PPI  -     US Soft Tissue Axilla, Right; Future; Expected date: 05/08/2023    3. Migraine without aura and without status migrainosus, not intractable  -     CT Head Without Contrast; Future; Expected date: 05/08/2023  -     CORTISOL, 8AM; Future; Expected date: 05/08/2023  -     H. PYLORI ANTIBODY, IGG; Future; Expected date: 05/08/2023  -     THYROID PEROXIDASE ANTIBODY; Future; Expected date: 05/08/2023  -     Lead, Blood  (Capillary); Future; Expected date: 05/08/2023    4. Plaque psoriasis    5. Lyme arthritis  -     CT Head Without Contrast; Future; Expected date: 05/08/2023  -     CORTISOL, 8AM; Future; Expected date: 05/08/2023  -     H. PYLORI ANTIBODY, IGG; Future; Expected date: 05/08/2023  -     THYROID PEROXIDASE ANTIBODY; Future; Expected date: 05/08/2023  -     Lead, Blood (Capillary); Future; Expected date: 05/08/2023  -     Ambulatory referral/consult to Infectious Disease; Future; Expected date: 05/15/2023    6. Family history of autoimmune disorder    7. Other long term (current) drug therapy    8. Dizziness and giddiness  -     CT Head Without Contrast; Future; Expected date: 05/08/2023    9. Brain fog--Neuro apt coming up  -     CT Head Without Contrast; Future; Expected date: 05/08/2023  -     CORTISOL, 8AM; Future; Expected date: 05/08/2023  -     H. PYLORI ANTIBODY, IGG; Future; Expected date: 05/08/2023  -     THYROID PEROXIDASE ANTIBODY; Future; Expected date: 05/08/2023  -     Lead, Blood (Capillary); Future; Expected date: 05/08/2023  -     BARTONELLA, MOLECULAR DETECTION, PCR, BLOOD; Future; Expected date: 05/08/2023  -     US Soft Tissue Axilla, Right; Future; Expected date: 05/08/2023  -     Ambulatory referral/consult to Infectious Disease; Future; Expected date: 05/15/2023    10. LAD (lymphadenopathy), axillary--consider CT chest if US negative and symptoms still present to evaluate for Lymphoma  -     BARTONELLA, MOLECULAR DETECTION, PCR, BLOOD; Future; Expected date: 05/08/2023  -     US Soft Tissue Axilla, Right; Future; Expected date: 05/08/2023  -     Ambulatory referral/consult to Infectious Disease; Future; Expected date: 05/15/2023           Nicol Shell MD  Internal Medicine          Face to Face time with patient: 32  45 minutes of total time spent on the encounter, which includes face to face time and non-face to face time preparing to see the patient (eg, review of tests), Obtaining and/or  reviewing separately obtained history, Documenting clinical information in the electronic or other health record, Independently interpreting results (not separately reported) and communicating results to the patient/family/caregiver, or Care coordination (not separately reported).         Each patient to whom he or she provides medical services by telemedicine is:  (1) informed of the relationship between the physician and patient and the respective role of any other health care provider with respect to management of the patient; and (2) notified that he or she may decline to receive medical services by telemedicine and may withdraw from such care at any time.

## 2023-06-21 ENCOUNTER — TELEPHONE (OUTPATIENT)
Dept: NEUROLOGY | Facility: CLINIC | Age: 23
End: 2023-06-21
Payer: COMMERCIAL

## 2023-06-23 ENCOUNTER — PATIENT OUTREACH (OUTPATIENT)
Dept: ADMINISTRATIVE | Facility: HOSPITAL | Age: 23
End: 2023-06-23
Payer: COMMERCIAL

## 2023-06-23 NOTE — PROGRESS NOTES
Health Maintenance Due   Topic Date Due    COVID-19 Vaccine (1) Never done    Pneumococcal Vaccines (Age 0-64) (1 - PCV) Never done    HPV Vaccines (1 - Risk 3-dose series) Never done    Pap Smear  Never done        Chart review done.    updated.   Immunizations reviewed & updated.   Care Everywhere updated.   [facility] reviewed

## 2023-06-27 ENCOUNTER — OFFICE VISIT (OUTPATIENT)
Dept: DERMATOLOGY | Facility: CLINIC | Age: 23
End: 2023-06-27
Payer: COMMERCIAL

## 2023-06-27 DIAGNOSIS — L70.9 ACNE, UNSPECIFIED ACNE TYPE: Primary | ICD-10-CM

## 2023-06-27 DIAGNOSIS — L40.0 PLAQUE PSORIASIS: ICD-10-CM

## 2023-06-27 PROCEDURE — 1160F RVW MEDS BY RX/DR IN RCRD: CPT | Mod: CPTII,S$GLB,, | Performed by: STUDENT IN AN ORGANIZED HEALTH CARE EDUCATION/TRAINING PROGRAM

## 2023-06-27 PROCEDURE — 99999 PR PBB SHADOW E&M-EST. PATIENT-LVL III: ICD-10-PCS | Mod: PBBFAC,,, | Performed by: STUDENT IN AN ORGANIZED HEALTH CARE EDUCATION/TRAINING PROGRAM

## 2023-06-27 PROCEDURE — 3044F PR MOST RECENT HEMOGLOBIN A1C LEVEL <7.0%: ICD-10-PCS | Mod: CPTII,S$GLB,, | Performed by: STUDENT IN AN ORGANIZED HEALTH CARE EDUCATION/TRAINING PROGRAM

## 2023-06-27 PROCEDURE — 99999 PR PBB SHADOW E&M-EST. PATIENT-LVL III: CPT | Mod: PBBFAC,,, | Performed by: STUDENT IN AN ORGANIZED HEALTH CARE EDUCATION/TRAINING PROGRAM

## 2023-06-27 PROCEDURE — 3044F HG A1C LEVEL LT 7.0%: CPT | Mod: CPTII,S$GLB,, | Performed by: STUDENT IN AN ORGANIZED HEALTH CARE EDUCATION/TRAINING PROGRAM

## 2023-06-27 PROCEDURE — 99213 PR OFFICE/OUTPT VISIT, EST, LEVL III, 20-29 MIN: ICD-10-PCS | Mod: S$GLB,,, | Performed by: STUDENT IN AN ORGANIZED HEALTH CARE EDUCATION/TRAINING PROGRAM

## 2023-06-27 PROCEDURE — 99213 OFFICE O/P EST LOW 20 MIN: CPT | Mod: S$GLB,,, | Performed by: STUDENT IN AN ORGANIZED HEALTH CARE EDUCATION/TRAINING PROGRAM

## 2023-06-27 PROCEDURE — 1159F MED LIST DOCD IN RCRD: CPT | Mod: CPTII,S$GLB,, | Performed by: STUDENT IN AN ORGANIZED HEALTH CARE EDUCATION/TRAINING PROGRAM

## 2023-06-27 PROCEDURE — 1159F PR MEDICATION LIST DOCUMENTED IN MEDICAL RECORD: ICD-10-PCS | Mod: CPTII,S$GLB,, | Performed by: STUDENT IN AN ORGANIZED HEALTH CARE EDUCATION/TRAINING PROGRAM

## 2023-06-27 PROCEDURE — 1160F PR REVIEW ALL MEDS BY PRESCRIBER/CLIN PHARMACIST DOCUMENTED: ICD-10-PCS | Mod: CPTII,S$GLB,, | Performed by: STUDENT IN AN ORGANIZED HEALTH CARE EDUCATION/TRAINING PROGRAM

## 2023-06-27 NOTE — PROGRESS NOTES
"  Subjective:      Patient ID:  Kiya Renae is a 23 y.o. female who presents for   Chief Complaint   Patient presents with    Acne     Follow up    Psoriasis     Follow up     Acne - Follow-up  Symptom course: improving  Affected locations: face  Signs / symptoms: redness    Psoriasis - Follow-up  Symptom course: improving  Affected locations: scalp, right ear and left ear  Signs / symptoms: dryness and itching    For acne, she is using BPO 4% cleanser and sal acid cleanser. At night she uses gentle cleanser, tretinoin 0.05%, and niacinamide. She did not get the dapsone gel due to cost. She continues to have occasional breakouts but is overall happy with the improvement and would like to continue    Problem List Items Addressed This Visit          Derm    Plaque psoriasis    Overview     - overall her skin is well controlled on skyrizi, but it flares just before being due for her shot  - topically, she is using fluocinolone oil in the ears and clobetasol solution for her scalp, which is helping. Her main concern is wax build up deeper inside the ear canal  - Elidel was not covered, so she continues to get involvement behind ears    Initial history:  - Has a diagnosis of psoriasis since at least 2021 but has a history of "dandruff" since middle school  - Only skin involvement has been scalp and ears  - It improved on skyrizi but still has some dandruff / scalp psoriasis and itching that flares right before next shot is due  - Use tea tree oil which she feels helps  - has psoriatic arthritis and is on skyrizi per rheumatology since 11/2022. Doing well          Other Visit Diagnoses       Acne, unspecified acne type    -  Primary          Review of Systems   Hematologic/Lymphatic: Does not bruise/bleed easily.     Objective:   Physical Exam   Constitutional: She appears well-developed and well-nourished. No distress.   Neurological: She is alert and oriented to person, place, and time. She is not disoriented. "   Psychiatric: She has a normal mood and affect.   Skin:   Areas Examined (abnormalities noted in diagram):   Head / Face Inspection Performed          Diagram Legend     Erythematous scaling macule/papule c/w actinic keratosis       Vascular papule c/w angioma      Pigmented verrucoid papule/plaque c/w seborrheic keratosis      Yellow umbilicated papule c/w sebaceous hyperplasia      Irregularly shaped tan macule c/w lentigo     1-2 mm smooth white papules consistent with Milia      Movable subcutaneous cyst with punctum c/w epidermal inclusion cyst      Subcutaneous movable cyst c/w pilar cyst      Firm pink to brown papule c/w dermatofibroma      Pedunculated fleshy papule(s) c/w skin tag(s)      Evenly pigmented macule c/w junctional nevus     Mildly variegated pigmented, slightly irregular-bordered macule c/w mildly atypical nevus      Flesh colored to evenly pigmented papule c/w intradermal nevus       Pink pearly papule/plaque c/w basal cell carcinoma      Erythematous hyperkeratotic cursted plaque c/w SCC      Surgical scar with no sign of skin cancer recurrence      Open and closed comedones      Inflammatory papules and pustules      Verrucoid papule consistent consistent with wart     Erythematous eczematous patches and plaques     Dystrophic onycholytic nail with subungual debris c/w onychomycosis     Umbilicated papule    Erythematous-base heme-crusted tan verrucoid plaque consistent with inflamed seborrheic keratosis     Erythematous Silvery Scaling Plaque c/w Psoriasis     See annotation      Assessment / Plan:        Acne--  - Overall improving but still getting breakouts on forehead and along jawline  - For face--BPO 4% cleanser, sal acid cleanser qPM and tretinoin qpm. Counseled on use of retinoids and handout provided  - Discussed adding in another topical (eg, amzeeq, dapsone, winlevi, etc) but patient would like to continue with just tretinoin for now due to cost of the creams  - discussed other  treatment options including spironolactone and doxy. Patient would like to use only topicals for now     Plaque psoriasis--only scalp involvement + PsA. Exam clear today but patient reports itching on scalp and in ears--improved with clobetasol for scalp and fluocinolone oil for ears  - on skyrizi prescribed by rheum--continue  - scalp and ears flare prior to timing for dose. C/w topicals as below  -     clobetasoL (TEMOVATE) 0.05 % external solution; Apply topically once daily. Use to affected areas for up to 2 weeks then take a 1 week break or decrease to 3 times weekly. Do not apply to groin or face  Dispense: 50 mL; Refill: 2  -     fluocinolone acetonide oiL 0.01 % Drop; Place 1 application in ear(s) once daily. Use to affected areas for up to 2 weeks then take a 1 week break or decrease to 3 times weekly. For use in ears  Dispense: 20 mL; Refill: 2         Follow up in about 3 months (around 9/27/2023).

## 2023-07-05 ENCOUNTER — OFFICE VISIT (OUTPATIENT)
Dept: PRIMARY CARE CLINIC | Facility: CLINIC | Age: 23
End: 2023-07-05
Payer: COMMERCIAL

## 2023-07-05 VITALS
BODY MASS INDEX: 23.45 KG/M2 | WEIGHT: 154.75 LBS | OXYGEN SATURATION: 99 % | HEIGHT: 68 IN | HEART RATE: 66 BPM | DIASTOLIC BLOOD PRESSURE: 72 MMHG | SYSTOLIC BLOOD PRESSURE: 112 MMHG

## 2023-07-05 DIAGNOSIS — G43.009 MIGRAINE WITHOUT AURA AND WITHOUT STATUS MIGRAINOSUS, NOT INTRACTABLE: Chronic | ICD-10-CM

## 2023-07-05 DIAGNOSIS — R11.2 NAUSEA AND VOMITING, UNSPECIFIED VOMITING TYPE: Primary | ICD-10-CM

## 2023-07-05 DIAGNOSIS — R63.4 WEIGHT LOSS: ICD-10-CM

## 2023-07-05 DIAGNOSIS — E03.8 SUBCLINICAL HYPOTHYROIDISM: ICD-10-CM

## 2023-07-05 DIAGNOSIS — A69.23 LYME ARTHRITIS: ICD-10-CM

## 2023-07-05 DIAGNOSIS — R10.84 GENERALIZED ABDOMINAL PAIN: ICD-10-CM

## 2023-07-05 DIAGNOSIS — L40.50 PSORIATIC ARTHRITIS: ICD-10-CM

## 2023-07-05 DIAGNOSIS — R19.7 DIARRHEA, UNSPECIFIED TYPE: ICD-10-CM

## 2023-07-05 PROCEDURE — 3044F HG A1C LEVEL LT 7.0%: CPT | Mod: CPTII,S$GLB,, | Performed by: STUDENT IN AN ORGANIZED HEALTH CARE EDUCATION/TRAINING PROGRAM

## 2023-07-05 PROCEDURE — 1159F PR MEDICATION LIST DOCUMENTED IN MEDICAL RECORD: ICD-10-PCS | Mod: CPTII,S$GLB,, | Performed by: STUDENT IN AN ORGANIZED HEALTH CARE EDUCATION/TRAINING PROGRAM

## 2023-07-05 PROCEDURE — 3008F PR BODY MASS INDEX (BMI) DOCUMENTED: ICD-10-PCS | Mod: CPTII,S$GLB,, | Performed by: STUDENT IN AN ORGANIZED HEALTH CARE EDUCATION/TRAINING PROGRAM

## 2023-07-05 PROCEDURE — 1160F RVW MEDS BY RX/DR IN RCRD: CPT | Mod: CPTII,S$GLB,, | Performed by: STUDENT IN AN ORGANIZED HEALTH CARE EDUCATION/TRAINING PROGRAM

## 2023-07-05 PROCEDURE — 99999 PR PBB SHADOW E&M-EST. PATIENT-LVL IV: ICD-10-PCS | Mod: PBBFAC,,, | Performed by: STUDENT IN AN ORGANIZED HEALTH CARE EDUCATION/TRAINING PROGRAM

## 2023-07-05 PROCEDURE — 3008F BODY MASS INDEX DOCD: CPT | Mod: CPTII,S$GLB,, | Performed by: STUDENT IN AN ORGANIZED HEALTH CARE EDUCATION/TRAINING PROGRAM

## 2023-07-05 PROCEDURE — 3074F PR MOST RECENT SYSTOLIC BLOOD PRESSURE < 130 MM HG: ICD-10-PCS | Mod: CPTII,S$GLB,, | Performed by: STUDENT IN AN ORGANIZED HEALTH CARE EDUCATION/TRAINING PROGRAM

## 2023-07-05 PROCEDURE — 3074F SYST BP LT 130 MM HG: CPT | Mod: CPTII,S$GLB,, | Performed by: STUDENT IN AN ORGANIZED HEALTH CARE EDUCATION/TRAINING PROGRAM

## 2023-07-05 PROCEDURE — 99999 PR PBB SHADOW E&M-EST. PATIENT-LVL IV: CPT | Mod: PBBFAC,,, | Performed by: STUDENT IN AN ORGANIZED HEALTH CARE EDUCATION/TRAINING PROGRAM

## 2023-07-05 PROCEDURE — 3078F PR MOST RECENT DIASTOLIC BLOOD PRESSURE < 80 MM HG: ICD-10-PCS | Mod: CPTII,S$GLB,, | Performed by: STUDENT IN AN ORGANIZED HEALTH CARE EDUCATION/TRAINING PROGRAM

## 2023-07-05 PROCEDURE — 3044F PR MOST RECENT HEMOGLOBIN A1C LEVEL <7.0%: ICD-10-PCS | Mod: CPTII,S$GLB,, | Performed by: STUDENT IN AN ORGANIZED HEALTH CARE EDUCATION/TRAINING PROGRAM

## 2023-07-05 PROCEDURE — 1160F PR REVIEW ALL MEDS BY PRESCRIBER/CLIN PHARMACIST DOCUMENTED: ICD-10-PCS | Mod: CPTII,S$GLB,, | Performed by: STUDENT IN AN ORGANIZED HEALTH CARE EDUCATION/TRAINING PROGRAM

## 2023-07-05 PROCEDURE — 3078F DIAST BP <80 MM HG: CPT | Mod: CPTII,S$GLB,, | Performed by: STUDENT IN AN ORGANIZED HEALTH CARE EDUCATION/TRAINING PROGRAM

## 2023-07-05 PROCEDURE — 1159F MED LIST DOCD IN RCRD: CPT | Mod: CPTII,S$GLB,, | Performed by: STUDENT IN AN ORGANIZED HEALTH CARE EDUCATION/TRAINING PROGRAM

## 2023-07-05 PROCEDURE — 99214 PR OFFICE/OUTPT VISIT, EST, LEVL IV, 30-39 MIN: ICD-10-PCS | Mod: S$GLB,,, | Performed by: STUDENT IN AN ORGANIZED HEALTH CARE EDUCATION/TRAINING PROGRAM

## 2023-07-05 PROCEDURE — 99214 OFFICE O/P EST MOD 30 MIN: CPT | Mod: S$GLB,,, | Performed by: STUDENT IN AN ORGANIZED HEALTH CARE EDUCATION/TRAINING PROGRAM

## 2023-07-05 RX ORDER — DICYCLOMINE HYDROCHLORIDE 20 MG/1
20 TABLET ORAL EVERY 8 HOURS PRN
Qty: 120 TABLET | Refills: 0 | Status: SHIPPED | OUTPATIENT
Start: 2023-07-05

## 2023-07-05 RX ORDER — DICYCLOMINE HYDROCHLORIDE 20 MG/1
20 TABLET ORAL EVERY 8 HOURS PRN
Qty: 120 TABLET | Refills: 0 | Status: SHIPPED | OUTPATIENT
Start: 2023-07-05 | End: 2023-07-05

## 2023-07-05 NOTE — PROGRESS NOTES
Kiya Renae  2000        Subjective     Chief Complaint: F/u    History of Present Illness:  Ms. Kiya Renae is a 23 y.o. female who presents to clinic for f/u.    Saw GI specialist in [place] for her chronic abdominal pain, nausea, vomiting. Had EGD there. Was told gastritis and esophagitis. +Small hiatal hernia. Initially thought from NSAIDs when she was  but then was told possible Crohn's based on labs. She has Psoriatic Arthritis. Also has autoimmune in family  (Lupus in family. Mom with Rheumatoid, psoriasis). Advised by GI in [place] to have colonoscopy for confirmation of dx, plan to tx with Skyrizi but higher dose per GI/Rheum there. Will try and send records.     Some diarrhea but no blood. Pain is upper and lower abdomen. Bentyl helps.    Already on Skyrizi Q 10 weeks. Had dose recently. Helping with abdominal pain and arthritis. Hand joint pain improved. Sees Rheum in [place] who is prescribing. Off PPI since being on Skyrizi.    + weight loss. About 15 lbs.    Wt Readings from Last 5 Encounters:   07/05/23 70.2 kg (154 lb 12.2 oz)   04/24/23 71.3 kg (157 lb 3 oz)     Hx of Lyme in 2019.    Still having headaches, Neuro appt scheduled in 8/2023.    Subclinical hypothyroidism- improved on labs in [place], on 5/31. TTSH and T4 normal.         Review of Systems   Constitutional:  Positive for malaise/fatigue and weight loss. Negative for chills and fever.   Respiratory:  Negative for cough.    Gastrointestinal:  Positive for abdominal pain, diarrhea, nausea and vomiting. Negative for blood in stool and constipation.        Intermittent   Musculoskeletal:  Positive for joint pain (Improving).   Neurological:  Positive for headaches.      PAST HISTORY:     Past Medical History:   Diagnosis Date    Anxiety 07/31/2021    Arthritis 12/30/2021    PsA    Joint pain     Lyme disease, unspecified     Psoriasis     Skin disease 12/30/2021    psorias on scalp, behind ears, and inside ear canal     Subclinical hypothyroidism 7/5/2023       History reviewed. No pertinent surgical history.    Family History   Problem Relation Age of Onset    Alcohol abuse Mother     Asthma Mother     Rheum arthritis Mother     Psoriasis Mother     Alcohol abuse Father     Migraines Father     Acne Father     Diabetes Maternal Grandmother     Heart failure Maternal Grandfather     Breast cancer Paternal Grandmother     Heart attack Paternal Grandmother     Breast cancer Paternal Aunt     Lupus Paternal Aunt     Depression Brother     Suicidality Brother     Eczema Brother        Social History     Socioeconomic History    Marital status: Single   Tobacco Use    Smoking status: Never     Passive exposure: Never    Smokeless tobacco: Never   Substance and Sexual Activity    Alcohol use: Not Currently     Comment: only socially and a very small amount if ever (one or two )    Drug use: Never    Sexual activity: Never   Other Topics Concern    Are you pregnant or think you may be? No    Breast-feeding No       MEDICATIONS & ALLERGIES:     Current Outpatient Medications on File Prior to Visit   Medication Sig    clobetasoL (TEMOVATE) 0.05 % external solution Apply topically once daily. Use to affected areas for up to 2 weeks then take a 1 week break or decrease to 3 times weekly. Do not apply to groin or face    fluocinolone acetonide oiL 0.01 % Drop Place 1 application in ear(s) once daily. Use to affected areas for up to 2 weeks then take a 1 week break or decrease to 3 times weekly. For use in ears    pantoprazole (PROTONIX) 40 MG tablet Take 1 tablet (40 mg total) by mouth once daily.    risankizumab-rzaa (SKYRIZI) 150 mg/mL PnIj     sertraline (ZOLOFT) 100 MG tablet     sertraline (ZOLOFT) 50 MG tablet Take 50 mg by mouth.    tretinoin (RETIN-A) 0.05 % cream Apply topically every evening. Start twice weekly and increase as tolerated. Pea sized amount to entire face.     No current facility-administered medications on file prior  "to visit.       Review of patient's allergies indicates:   Allergen Reactions    Azithromycin Hives, Rash and Swelling       OBJECTIVE:     Vital Signs:  Vitals:    07/05/23 1417   BP: 112/72   BP Location: Right arm   Patient Position: Sitting   BP Method: Medium (Manual)   Pulse: 66   SpO2: 99%   Weight: 70.2 kg (154 lb 12.2 oz)   Height: 5' 8" (1.727 m)       Body mass index is 23.53 kg/m².     Physical Exam:  Physical Exam  Vitals and nursing note reviewed.   Constitutional:       General: She is not in acute distress.     Appearance: Normal appearance. She is not ill-appearing, toxic-appearing or diaphoretic.   HENT:      Head: Normocephalic and atraumatic.   Eyes:      General: No scleral icterus.        Right eye: No discharge.         Left eye: No discharge.      Conjunctiva/sclera: Conjunctivae normal.   Pulmonary:      Effort: Pulmonary effort is normal. No respiratory distress.   Abdominal:      General: There is no distension.   Neurological:      Mental Status: She is alert and oriented to person, place, and time. Mental status is at baseline.   Psychiatric:         Mood and Affect: Mood normal.         Behavior: Behavior normal.          Laboratory  Lab Results   Component Value Date    WBC 6.36 04/24/2023    HGB 13.4 04/24/2023    HCT 40.5 04/24/2023    MCV 91 04/24/2023     04/24/2023     Lab Results   Component Value Date    GLU 87 04/24/2023     04/24/2023    K 4.4 04/24/2023     04/24/2023    CO2 26 04/24/2023    BUN 15 04/24/2023    CREATININE 1.0 04/24/2023    CALCIUM 9.8 04/24/2023     No results found for: INR, PROTIME  Lab Results   Component Value Date    HGBA1C 5.1 04/24/2023           Health Maintenance         Date Due Completion Date    COVID-19 Vaccine (1) Never done ---    HPV Vaccines (1 - 2-dose series) Never done ---    Influenza Vaccine (1) 09/01/2023 ---    Pap Smear 06/23/2025 6/23/2022    TETANUS VACCINE 03/19/2033 3/19/2023          Cholesterol: " (q5yr>21yo)  Vaccines: Influenza (yearly); Tetanus (every 10 yrs - 1st tdap); Pneumovax (>64yo); Prevnar; Zoster (>61yo)  Eye exam:  Mammogram: (to age 73yo)  Gyn exam:  Prostate:  Colonoscopy:  DEXA: (F>66 yo, M >69yo, M&F 50-68 yo with risk factors)  A1c: (>45 yearly)      ASSESSMENT & PLAN:   Ms. Kiya Renae is a 23 y.o. female who was seen today in clinic for f/u. Will obtain diagnostic colonoscopy (can run through insurance first) and refer to IBD specialist.      1. Nausea and vomiting, unspecified vomiting type  -     Ambulatory referral/consult to Endo Procedure ; Future; Expected date: 07/06/2023  -     Ambulatory referral/consult to Gastroenterology; Future; Expected date: 07/12/2023  -     Discontinue: dicyclomine (BENTYL) 20 mg tablet; Take 1 tablet (20 mg total) by mouth every 8 (eight) hours as needed (abdominal cramping).  Dispense: 120 tablet; Refill: 0  -     dicyclomine (BENTYL) 20 mg tablet; Take 1 tablet (20 mg total) by mouth every 8 (eight) hours as needed (abdominal cramping).  Dispense: 120 tablet; Refill: 0    2. Weight loss  -     Ambulatory referral/consult to Endo Procedure ; Future; Expected date: 07/06/2023  -     Ambulatory referral/consult to Gastroenterology; Future; Expected date: 07/12/2023  -     Discontinue: dicyclomine (BENTYL) 20 mg tablet; Take 1 tablet (20 mg total) by mouth every 8 (eight) hours as needed (abdominal cramping).  Dispense: 120 tablet; Refill: 0  -     dicyclomine (BENTYL) 20 mg tablet; Take 1 tablet (20 mg total) by mouth every 8 (eight) hours as needed (abdominal cramping).  Dispense: 120 tablet; Refill: 0    3. Generalized abdominal pain  -     Ambulatory referral/consult to Endo Procedure ; Future; Expected date: 07/06/2023  -     Ambulatory referral/consult to Gastroenterology; Future; Expected date: 07/12/2023  -     Discontinue: dicyclomine (BENTYL) 20 mg tablet; Take 1 tablet (20 mg total) by mouth every 8 (eight)  hours as needed (abdominal cramping).  Dispense: 120 tablet; Refill: 0  -     dicyclomine (BENTYL) 20 mg tablet; Take 1 tablet (20 mg total) by mouth every 8 (eight) hours as needed (abdominal cramping).  Dispense: 120 tablet; Refill: 0    4. Diarrhea, unspecified type  -     Ambulatory referral/consult to Gastroenterology; Future; Expected date: 07/12/2023  -     Discontinue: dicyclomine (BENTYL) 20 mg tablet; Take 1 tablet (20 mg total) by mouth every 8 (eight) hours as needed (abdominal cramping).  Dispense: 120 tablet; Refill: 0  -     dicyclomine (BENTYL) 20 mg tablet; Take 1 tablet (20 mg total) by mouth every 8 (eight) hours as needed (abdominal cramping).  Dispense: 120 tablet; Refill: 0    5. Psoriatic arthritis  -     Ambulatory referral/consult to Endo Procedure ; Future; Expected date: 07/06/2023    6. Subclinical hypothyroidism    7. Lyme arthritis    8. Migraine without aura and without status migrainosus, not intractable         RTC in 1-3m pending specialists    Nicol Shell MD  Internal Medicine         Portions of this note may have been generated using voice recognition software.  Please excuse any spelling/grammatical errors. Occasional wrong-word or sound-a-like substitutions may have also occurred due to the inherent limitations of voice recognition software. Please read the chart carefully and recognize, using context, where substitutions have occurred.

## 2023-08-08 ENCOUNTER — OFFICE VISIT (OUTPATIENT)
Dept: NEUROLOGY | Facility: CLINIC | Age: 23
End: 2023-08-08
Payer: COMMERCIAL

## 2023-08-08 VITALS — SYSTOLIC BLOOD PRESSURE: 133 MMHG | HEART RATE: 82 BPM | DIASTOLIC BLOOD PRESSURE: 87 MMHG

## 2023-08-08 DIAGNOSIS — G44.221 CHRONIC TENSION-TYPE HEADACHE, INTRACTABLE: Primary | ICD-10-CM

## 2023-08-08 DIAGNOSIS — G50.9 TRIGEMINAL NEUROPATHY: ICD-10-CM

## 2023-08-08 DIAGNOSIS — G93.2 IIH (IDIOPATHIC INTRACRANIAL HYPERTENSION): ICD-10-CM

## 2023-08-08 DIAGNOSIS — B02.29 POST HERPETIC NEURALGIA: ICD-10-CM

## 2023-08-08 PROCEDURE — 1159F PR MEDICATION LIST DOCUMENTED IN MEDICAL RECORD: ICD-10-PCS | Mod: CPTII,S$GLB,, | Performed by: PSYCHIATRY & NEUROLOGY

## 2023-08-08 PROCEDURE — 1159F MED LIST DOCD IN RCRD: CPT | Mod: CPTII,S$GLB,, | Performed by: PSYCHIATRY & NEUROLOGY

## 2023-08-08 PROCEDURE — 99999 PR PBB SHADOW E&M-EST. PATIENT-LVL IV: ICD-10-PCS | Mod: PBBFAC,,, | Performed by: PSYCHIATRY & NEUROLOGY

## 2023-08-08 PROCEDURE — 3079F PR MOST RECENT DIASTOLIC BLOOD PRESSURE 80-89 MM HG: ICD-10-PCS | Mod: CPTII,S$GLB,, | Performed by: PSYCHIATRY & NEUROLOGY

## 2023-08-08 PROCEDURE — 1160F PR REVIEW ALL MEDS BY PRESCRIBER/CLIN PHARMACIST DOCUMENTED: ICD-10-PCS | Mod: CPTII,S$GLB,, | Performed by: PSYCHIATRY & NEUROLOGY

## 2023-08-08 PROCEDURE — 3079F DIAST BP 80-89 MM HG: CPT | Mod: CPTII,S$GLB,, | Performed by: PSYCHIATRY & NEUROLOGY

## 2023-08-08 PROCEDURE — 3075F SYST BP GE 130 - 139MM HG: CPT | Mod: CPTII,S$GLB,, | Performed by: PSYCHIATRY & NEUROLOGY

## 2023-08-08 PROCEDURE — 3075F PR MOST RECENT SYSTOLIC BLOOD PRESS GE 130-139MM HG: ICD-10-PCS | Mod: CPTII,S$GLB,, | Performed by: PSYCHIATRY & NEUROLOGY

## 2023-08-08 PROCEDURE — 3044F HG A1C LEVEL LT 7.0%: CPT | Mod: CPTII,S$GLB,, | Performed by: PSYCHIATRY & NEUROLOGY

## 2023-08-08 PROCEDURE — 99204 OFFICE O/P NEW MOD 45 MIN: CPT | Mod: S$GLB,,, | Performed by: PSYCHIATRY & NEUROLOGY

## 2023-08-08 PROCEDURE — 3044F PR MOST RECENT HEMOGLOBIN A1C LEVEL <7.0%: ICD-10-PCS | Mod: CPTII,S$GLB,, | Performed by: PSYCHIATRY & NEUROLOGY

## 2023-08-08 PROCEDURE — 99999 PR PBB SHADOW E&M-EST. PATIENT-LVL IV: CPT | Mod: PBBFAC,,, | Performed by: PSYCHIATRY & NEUROLOGY

## 2023-08-08 PROCEDURE — 99204 PR OFFICE/OUTPT VISIT, NEW, LEVL IV, 45-59 MIN: ICD-10-PCS | Mod: S$GLB,,, | Performed by: PSYCHIATRY & NEUROLOGY

## 2023-08-08 PROCEDURE — 1160F RVW MEDS BY RX/DR IN RCRD: CPT | Mod: CPTII,S$GLB,, | Performed by: PSYCHIATRY & NEUROLOGY

## 2023-08-08 RX ORDER — GABAPENTIN 100 MG/1
100 CAPSULE ORAL DAILY PRN
COMMUNITY

## 2023-08-08 RX ORDER — PREGABALIN 75 MG/1
75 CAPSULE ORAL NIGHTLY
Qty: 30 CAPSULE | Refills: 2 | Status: SHIPPED | OUTPATIENT
Start: 2023-08-08 | End: 2023-11-10

## 2023-08-08 NOTE — PROGRESS NOTES
Chief Complaint: Headache    Subjective:     History of Present Illness    Referring Provider: Dr. Shell  Accompanied by: No one    08/08/2023: Kiya Renae is a 23 y.o. female with h/o anxiety, lyme disease who presents for headache evaluation. Headaches started 6 years ago when graduated from high school that evolved to left side of face after she had Lyme disease. Currently, headaches are constant with varying intensity, pain is left eyebrow to left forehead, throbbing, pressure. She states that she has trigeminal neuralgia after getting Shingles in 2019 on right forehead and currently the pain is right forehead, right cheek, and right lower jaw, constant with 1-2 times of increased pain during the day, sharp, itching, tingling sometimes. She she has left cervical paraspinal stiffness. She has photophobia but not sure what it is from. She will have bilateral blurred vision and feels like at times she sees things like in stop-motion film and in screenshots. She states brain glitches sometimes and feels like brain takes a screen shot that makes her feel dizzy after she got Covid for second time in 2022 but is not a spinning sensation and this is best description of her dizziness and at this time she had left anterior lateral thigh numbness that resolved but still feels unusual sometimes. She has nausea from GI issues. She denies phonophobia, weakness, vomiting, spinning, imbalance, aura. Stress will trigger right facial pain. Wind will feel burning on right side of face. Alcohol consumption would cause sensation in right cheek. She denies chewing, hot or cold liquids, and touching right face as triggers. She sometimes sleeps well and wakes up tired and will sometimes use melatonin. She denies snoring and apnea. She denies a positional component and vasal vagal maneuvers significantly worsen headaches. She is unsure if headache has woken her up and will wake up with headaches. Mood is fine. 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 She has tried Lyrica and was stopped because she had the below spell while on it and stopped in case it was etiology for below spell but Lyrica was really helping her. She was on one medication that she does not remember name of. Gabapentin helps but had short term memory loss on daily use so does not take it daily and has had fatigue from it before and takes it at night time and takes 100 mg. She states she is currently being worked up for Chron's. She states that she has had one spell that was never determined as seizure and myoclonic spasm when body dropped when working out shortly after Lyme disease diagnosis. She denies menstrual correlate.     Current Outpatient Medications on File Prior to Visit   Medication Sig Dispense Refill    clobetasoL (TEMOVATE) 0.05 % external solution Apply topically once daily. Use to affected areas for up to 2 weeks then take a 1 week break or decrease to 3 times weekly. Do not apply to groin or face 50 mL 2    dicyclomine (BENTYL) 20 mg tablet Take 1 tablet (20 mg total) by mouth every 8 (eight) hours as needed (abdominal cramping). 120 tablet 0    fluocinolone acetonide oiL 0.01 % Drop Place 1 application in ear(s) once daily. Use to affected areas for up to 2 weeks then take a 1 week break or decrease to 3 times weekly. For use in ears 20 mL 2    gabapentin (NEURONTIN) 100 MG capsule Take 100 mg by mouth daily as needed.      pantoprazole (PROTONIX) 40 MG tablet Take 1 tablet (40 mg total) by mouth once daily. 30 tablet 3    risankizumab-rzaa (SKYRIZI) 150 mg/mL PnIj       sertraline (ZOLOFT) 100 MG tablet       sertraline (ZOLOFT) 50 MG tablet Take 50 mg by mouth.      tretinoin (RETIN-A) 0.05 % cream Apply topically every evening. Start twice weekly and increase as tolerated. Pea sized amount to entire face. 20 g 3     No current facility-administered medications on file prior to visit.       Review of patient's allergies indicates:    Allergen Reactions    Azithromycin Hives, Rash and Swelling       Family History   Problem Relation Age of Onset    Alcohol abuse Mother     Asthma Mother     Rheum arthritis Mother     Psoriasis Mother     Alcohol abuse Father     Migraines Father     Acne Father     Depression Brother     Suicidality Brother     Eczema Brother     Diabetes Maternal Grandmother     Heart failure Maternal Grandfather     Breast cancer Paternal Grandmother     Heart attack Paternal Grandmother     Breast cancer Paternal Aunt     Lupus Paternal Aunt     Migraines Paternal Aunt        Social History     Tobacco Use    Smoking status: Never     Passive exposure: Never    Smokeless tobacco: Never   Substance Use Topics    Alcohol use: Not Currently     Comment: only socially and a very small amount if ever (one or two )    Drug use: Never       Review of Systems  Constitutional: No fevers, no chills, no change in weight  Eye/Vision: See HPI  Ear/Nose/Mouth/Throat: See HPI; no cough, no runny nose, no sore throat  Respiratory: No shortness of breath, no problems breathing  Cardiovascular: No chest pain  Gastrointestinal: See HPI, no diarrhea, no constipation  Genitourinary: No dysuria  Musculoskeletal: See HPI  Integumentary: Psoriasis  Neurologic: See HPI  Psychiatric: Denies depression, denies anxiety, denies SI and HI.    Objective:     Vitals:    08/08/23 0845   BP: 133/87   Pulse: 82       General: Alert and awake, no acute distress, well groomed, well nourished  Eyes: Pupils are equal, round and reactive to light; Extraocular movements are intact; Normal conjunctiva; no nystagmus; Visual fields are Full to finger counting; No saccadic intrusions of volitional ocular smooth pursuits.   HENT: Normocephalic, Rinne test positive bilaterally, right-lateralizing Major tuning fork exam, Oral mucosa is moist, No pharyngeal erythema.  Neck: Supple  Cardiovascular: Normal rate, Regular rhythm, No murmur, No carotid bruit  Musculoskeletal:  No swelling.  Integumentary: Warm, Dry, Intact, No pallor, No rash.    Neurologic Exam  Mental Status: orientated to time, person, and place; good recent and remote memory; attention and concentration WNL; naming intact; adequate fund of knowledge. No aphasia or dysarthria. Repetition intact. Follows complex commands    Cranial Nerves: as above, V1-V3 temperature sensation WNL bilaterally except decreased left forehead and splits the midline, face symmetric, symmetrical palatal rise, SCM 5/5 bilaterally, tongue protrusion midline and movements WNL    Muscle Tone/Motor Function: Normal bulk and tone throughout. No drift. Normal rapidly alternating movements. No tremors. No abnormal movements                                                           Right                            Left                           Deltoid                    5/5                                5/5                           Biceps                    5/5                                 5/5                           Triceps                   5/5                                5/5                           Iliopsoas                 5/5                                at least 4/5 and appears effort dependent                           Quadriceps             5/5                                5/5                           Hamstring               5/5                                5/5                           Dorsiflexion             5/5                                5/5                           Sensory: Vibration sensation WNL x4, Temperature sensation WNL bilateral hands and right foot and decreased left foot, Rhomberg negative    Reflexes: Symmetrical DTR's, Biceps 2+, Brachioradialis 2+, Patellar 2+; No Wartenberg    Coordination: No truncal ataxia. Finger to nose WNL bilaterally.     Gait: Gait WNL, Heel to toe walking WNL    Labs:    Lab Visit on 04/24/2023   Component Date Value Ref Range Status    WBC 04/24/2023 6.36  3.90 - 12.70 K/uL  Final    RBC 04/24/2023 4.43  4.00 - 5.40 M/uL Final    Hemoglobin 04/24/2023 13.4  12.0 - 16.0 g/dL Final    Hematocrit 04/24/2023 40.5  37.0 - 48.5 % Final    MCV 04/24/2023 91  82 - 98 fL Final    MCH 04/24/2023 30.2  27.0 - 31.0 pg Final    MCHC 04/24/2023 33.1  32.0 - 36.0 g/dL Final    RDW 04/24/2023 12.1  11.5 - 14.5 % Final    Platelets 04/24/2023 215  150 - 450 K/uL Final    MPV 04/24/2023 13.2 (H)  9.2 - 12.9 fL Final    Immature Granulocytes 04/24/2023 0.5  0.0 - 0.5 % Final    Gran # (ANC) 04/24/2023 4.0  1.8 - 7.7 K/uL Final    Immature Grans (Abs) 04/24/2023 0.03  0.00 - 0.04 K/uL Final    Comment: Mild elevation in immature granulocytes is non specific and   can be seen in a variety of conditions including stress response,   acute inflammation, trauma and pregnancy. Correlation with other   laboratory and clinical findings is essential.      Lymph # 04/24/2023 1.3  1.0 - 4.8 K/uL Final    Mono # 04/24/2023 0.8  0.3 - 1.0 K/uL Final    Eos # 04/24/2023 0.2  0.0 - 0.5 K/uL Final    Baso # 04/24/2023 0.05  0.00 - 0.20 K/uL Final    nRBC 04/24/2023 0  0 /100 WBC Final    Gran % 04/24/2023 63.2  38.0 - 73.0 % Final    Lymph % 04/24/2023 20.1  18.0 - 48.0 % Final    Mono % 04/24/2023 12.1  4.0 - 15.0 % Final    Eosinophil % 04/24/2023 3.3  0.0 - 8.0 % Final    Basophil % 04/24/2023 0.8  0.0 - 1.9 % Final    Differential Method 04/24/2023 Automated   Final    Sodium 04/24/2023 139  136 - 145 mmol/L Final    Potassium 04/24/2023 4.4  3.5 - 5.1 mmol/L Final    Chloride 04/24/2023 106  95 - 110 mmol/L Final    CO2 04/24/2023 26  23 - 29 mmol/L Final    Glucose 04/24/2023 87  70 - 110 mg/dL Final    BUN 04/24/2023 15  6 - 20 mg/dL Final    Creatinine 04/24/2023 1.0  0.5 - 1.4 mg/dL Final    Calcium 04/24/2023 9.8  8.7 - 10.5 mg/dL Final    Total Protein 04/24/2023 7.0  6.0 - 8.4 g/dL Final    Albumin 04/24/2023 4.3  3.5 - 5.2 g/dL Final    Total Bilirubin 04/24/2023 0.3  0.1 - 1.0 mg/dL Final    Comment: For  infants and newborns, interpretation of results should be based  on gestational age, weight and in agreement with clinical  observations.    Premature Infant recommended reference ranges:  Up to 24 hours.............<8.0 mg/dL  Up to 48 hours............<12.0 mg/dL  3-5 days..................<15.0 mg/dL  6-29 days.................<15.0 mg/dL      Alkaline Phosphatase 04/24/2023 53 (L)  55 - 135 U/L Final    AST 04/24/2023 15  10 - 40 U/L Final    ALT 04/24/2023 14  10 - 44 U/L Final    Anion Gap 04/24/2023 7 (L)  8 - 16 mmol/L Final    eGFR 04/24/2023 >60.0  >60 mL/min/1.73 m^2 Final    Hemoglobin A1C 04/24/2023 5.1  4.0 - 5.6 % Final    Comment: ADA Screening Guidelines:  5.7-6.4%  Consistent with prediabetes  >or=6.5%  Consistent with diabetes    High levels of fetal hemoglobin interfere with the HbA1C  assay. Heterozygous hemoglobin variants (HbS, HgC, etc)do  not significantly interfere with this assay.   However, presence of multiple variants may affect accuracy.      Estimated Avg Glucose 04/24/2023 100  68 - 131 mg/dL Final    Cholesterol 04/24/2023 182  120 - 199 mg/dL Final    Comment: The National Cholesterol Education Program (NCEP) has set the  following guidelines (reference ranges) for Cholesterol:  Optimal.....................<200 mg/dL  Borderline High.............200-239 mg/dL  High........................> or = 240 mg/dL      Triglycerides 04/24/2023 77  30 - 150 mg/dL Final    Comment: The National Cholesterol Education Program (NCEP) has set the  following guidelines (reference values) for triglycerides:  Normal......................<150 mg/dL  Borderline High.............150-199 mg/dL  High........................200-499 mg/dL      HDL 04/24/2023 69  40 - 75 mg/dL Final    Comment: The National Cholesterol Education Program (NCEP) has set the  following guidelines (reference values) for HDL Cholesterol:  Low...............<40 mg/dL  Optimal...........>60 mg/dL      LDL Cholesterol 04/24/2023  97.6  63.0 - 159.0 mg/dL Final    Comment: The National Cholesterol Education Program (NCEP) has set the  following guidelines (reference values) for LDL Cholesterol:  Optimal.......................<130 mg/dL  Borderline High...............130-159 mg/dL  High..........................160-189 mg/dL  Very High.....................>190 mg/dL      HDL/Cholesterol Ratio 04/24/2023 37.9  20.0 - 50.0 % Final    Total Cholesterol/HDL Ratio 04/24/2023 2.6  2.0 - 5.0 Final    Non-HDL Cholesterol 04/24/2023 113  mg/dL Final    Comment: Risk category and Non-HDL cholesterol goals:  Coronary heart disease (CHD)or equivalent (10-year risk of CHD >20%):  Non-HDL cholesterol goal     <130 mg/dL  Two or more CHD risk factors and 10-year risk of CHD <= 20%:  Non-HDL cholesterol goal     <160 mg/dL  0 to 1 CHD risk factor:  Non-HDL cholesterol goal     <190 mg/dL      TSH 04/24/2023 4.681 (H)  0.400 - 4.000 uIU/mL Final    Vit D, 25-Hydroxy 04/24/2023 28 (L)  30 - 96 ng/mL Final    Comment: Vitamin D deficiency.........<10 ng/mL                              Vitamin D insufficiency......10-29 ng/mL       Vitamin D sufficiency........> or equal to 30 ng/mL  Vitamin D toxicity............>100 ng/mL      Hepatitis C Ab 04/24/2023 Non-reactive  Non-reactive Final    HIV 1/2 Ag/Ab 04/24/2023 Non-reactive  Non-reactive Final    MELYSSA Screen 04/24/2023 Negative <1:80  Negative <1:80 Final    MELYSSA test was performed by Immunofluorescence on HEP2 cells.       ds DNA Ab 04/24/2023 Negative 1:10  Negative 1:10 Final    Performed by fluorescent crithidia assay.    Rheumatoid Factor 04/24/2023 <13.0  0.0 - 15.0 IU/mL Final    CCP Antibodies 04/24/2023 <0.5  <5.0 U/mL Final    Free T4 04/24/2023 0.91  0.71 - 1.51 ng/dL Final      I personally reviewed all current labs noted in today's chart.    Imaging:    No neurological imaging to review    Assessment:       ICD-10-CM ICD-9-CM    1. Chronic tension-type headache, intractable  G44.221 339.12  Ambulatory referral/consult to Neurology      2. Trigeminal neuropathy  G50.9 350.9       3. Post herpetic neuralgia  B02.29 053.19       23 y.o. female with h/o anxiety, lyme disease who presents for headache evaluation. On exam, she splits the midline and appears to have effort dependence with left iliopsoas testing but is able to ambulate including heel to toe walking without any noticeable deficit in LLE. We discussed left sided headache does not fit a headache category other than tension headache at this time. We discussed right sided facial pain could be either trigeminal neuropathy given pain lasts too long for trigeminal neuralgia or could be post herpetic pain. We discussed would try a different neuropathic pain medication but cannot do TCAs or cymbalta given high dose of Zoloft she is already on and would like to try and avoid carbamazepine given side effect profile. Will try Lyrica again as would be unusual to cause seizure. Her exam is concerning for a psychogenic component. Will get MRI Brain due to headache red flags for increased intracranial pressure.    Plan:     Discontinue gabapentin  Start Lyrica 75 mg nightly. Message me as a I can increase dose if needed  MRI Brain without contrast    Filemon Banuelos MD  Sports Neurology

## 2023-08-11 ENCOUNTER — OFFICE VISIT (OUTPATIENT)
Dept: GASTROENTEROLOGY | Facility: CLINIC | Age: 23
End: 2023-08-11
Payer: COMMERCIAL

## 2023-08-11 VITALS — BODY MASS INDEX: 22.99 KG/M2 | HEIGHT: 68 IN | WEIGHT: 151.69 LBS

## 2023-08-11 DIAGNOSIS — R10.84 GENERALIZED ABDOMINAL PAIN: ICD-10-CM

## 2023-08-11 DIAGNOSIS — R11.2 NAUSEA AND VOMITING, UNSPECIFIED VOMITING TYPE: ICD-10-CM

## 2023-08-11 DIAGNOSIS — R63.4 WEIGHT LOSS: ICD-10-CM

## 2023-08-11 DIAGNOSIS — R19.7 DIARRHEA, UNSPECIFIED TYPE: ICD-10-CM

## 2023-08-11 PROCEDURE — 3008F PR BODY MASS INDEX (BMI) DOCUMENTED: ICD-10-PCS | Mod: CPTII,S$GLB,, | Performed by: INTERNAL MEDICINE

## 2023-08-11 PROCEDURE — 99204 PR OFFICE/OUTPT VISIT, NEW, LEVL IV, 45-59 MIN: ICD-10-PCS | Mod: S$GLB,,, | Performed by: INTERNAL MEDICINE

## 2023-08-11 PROCEDURE — 3008F BODY MASS INDEX DOCD: CPT | Mod: CPTII,S$GLB,, | Performed by: INTERNAL MEDICINE

## 2023-08-11 PROCEDURE — 99204 OFFICE O/P NEW MOD 45 MIN: CPT | Mod: S$GLB,,, | Performed by: INTERNAL MEDICINE

## 2023-08-11 PROCEDURE — 3044F PR MOST RECENT HEMOGLOBIN A1C LEVEL <7.0%: ICD-10-PCS | Mod: CPTII,S$GLB,, | Performed by: INTERNAL MEDICINE

## 2023-08-11 PROCEDURE — 1159F PR MEDICATION LIST DOCUMENTED IN MEDICAL RECORD: ICD-10-PCS | Mod: CPTII,S$GLB,, | Performed by: INTERNAL MEDICINE

## 2023-08-11 PROCEDURE — 99999 PR PBB SHADOW E&M-EST. PATIENT-LVL IV: CPT | Mod: PBBFAC,,, | Performed by: INTERNAL MEDICINE

## 2023-08-11 PROCEDURE — 3044F HG A1C LEVEL LT 7.0%: CPT | Mod: CPTII,S$GLB,, | Performed by: INTERNAL MEDICINE

## 2023-08-11 PROCEDURE — 1159F MED LIST DOCD IN RCRD: CPT | Mod: CPTII,S$GLB,, | Performed by: INTERNAL MEDICINE

## 2023-08-11 PROCEDURE — 99999 PR PBB SHADOW E&M-EST. PATIENT-LVL IV: ICD-10-PCS | Mod: PBBFAC,,, | Performed by: INTERNAL MEDICINE

## 2023-08-11 RX ORDER — SODIUM, POTASSIUM,MAG SULFATES 17.5-3.13G
1 SOLUTION, RECONSTITUTED, ORAL ORAL DAILY
Qty: 1 KIT | Refills: 0 | Status: SHIPPED | OUTPATIENT
Start: 2023-08-11 | End: 2023-08-13

## 2023-08-11 NOTE — PATIENT INSTRUCTIONS
Stool Collection Kit Instructions    Place stool Collection Hat under your toilet seat   With and disposable spoon scoop and fill the provided cup with stool to the half way ilana on the stool cup (if your stool is runny or liquid that is fine)  Place the top on the stool cup  Place the stool cup in the biohazard bag and zip the bag closed   Place the biohazard bag in the brown paper bag that is provided   Bring the stool sample to any [facility] lab (Any location where you can get blood work done)  Discard any extra equipment (throw away the stool hat, spoon WE DO NOT NEED THAT BACK)        If the sample is collected after clinic hours, please place in refrigerator or cooler until the next morning.  Please make sure that there is no Tissue, wipes, or other paper attached to the stool cup (the lab will have you repeat if something is attached to the cup).          SUPREP Instructions    [facility] [address]  Clinic Office [phone]  Endoscopy Lab [phone]    You are scheduled for a Colonoscopy with Dr. Ferrell   on Aug 22, 2023  at [facility] in [place].    Check in at the Hospital -1st floor, Information desk.   Call [phone] to reschedule.    An adult friend/family member must come with you to drive you home.  You cannot drive, take a taxi, Uber/Lyft or bus to leave the Endoscopy Center alone.  If you do not have someone to drive you home, your test will be cancelled.     Please follow the directions of your doctor if you take any pills that thin your blood. If you take these meds: Aggrenox, Brilinta, Effient, Eliquis, Lovenox, Plavix, Pletal, Pradaxa, Ticilid, Xarelto or Coumadin, let the doctor's office know.    DON'T: On the morning of the test do not take insulin or pills for diabetes.     DO: On the morning of the test, do take any pills for blood pressure, heart, anti-rejection and or seizures with a small sip of water. Bring any inhalers with you.    To have a good  prep, you must follow these instructions - please do not use the directions from the pharmacy.    The doctor will send a prescription for the SUPREP.      The Day Before the test:    You can only drink CLEAR LIQUIDS the whole day before your test.  You can't eat any food for the whole day.    You CAN have:  Water, Coffee or decaf coffee (no milk or cream)  Tea  Soft drinks - regular and sugar free  Jello (green or yellow)  Apple Juice, white grape juice, white cranberry juice  Gatorade, Power Aid, Crystal Light, Parker Aid  Lemonade and Limeade  Bouillon, clear soup  Snowball, popsicles  YOU CAN'T DRINK ANYTHING RED, PURPLE ORANGE OR BLUE   YOU CAN'T DRINK ALCOHOL  ONLY DRINK WHAT IS ON THE LIST      At 5 pm the night before your test:    Pour the 1st bottle of SUPREP into the cup provided in the box. Add water to the line on the cup and mix well.  Drink the whole cup and then drink 2 more full cups of water over 1 hour.  This can be easier to drink if it is cold. You can mix it 20 minutes ahead of time and place in the refrigerator before you drink it.  You must drink it within 30-45 minutes of mixing it.  Do NOT pour the drink over ice.  You can drink it with a straw.    The Day of the test - We will call you 2 days before your test to tell you what time to get there.    5 hours before you come to the hospital (this may be in the middle of the night)  Pour the 2nd bottle of SUPREP into the cup provided in the box. Add water to the line on the cup and mix well.  Drink the whole cup and then drink 2 more full cups of water over 1 hour.  It might be easier to drink if it is cold. You can mix it 20 minutes ahead of time and place in the refrigerator before you drink it.  You must drink it within 30-45 minutes of mixing it.  Do NOT pour the drink over ice.  You can drink it with a straw.    YOU CAN'T EAT OR DRINK ANYTHING ELSE ONCE YOU FINISH THE PREP    Leave all valuables and jewelry at home. You will be at the  [facility] for 2-4 hours.    Call the Endoscopy department at [phone] with any questions about your procedure.

## 2023-08-11 NOTE — PROGRESS NOTES
GASTROENTEROLOGY CLINIC NOTE    Reason for visit: Diagnoses of Nausea and vomiting, unspecified vomiting type, Generalized abdominal pain, Diarrhea, unspecified type, and Weight loss were pertinent to this visit.  Referring provider/PCP: Nicol Shell MD    HPI:  Kiya Renae is a 23 y.o. female here today for abd pain , diarrhea, wt loss , new patient.  Previously seeing GI doc in [facility].    She is having 6 months or so of N/V and abdominal pain, evalauted by GI md in [facility] but recently moved here. Assoc changes bowels, no blood in stool, no aggrivating factors. Was taking levsin and zofran and protonix. Generalized pain lower abd , tender to touch.  EGD recently few months ago was normal. She was dx with PsA and started skyrizi.   Was getting jaskaran Raeann every 12 weeks but around weeks 11 and 12 she noticed worsening symptoms including joint pains but as well as flares of her bowel movements, and thus this was increased recently did every 10 weeks.  She had a Prometheus panel that was positive for sacchromyces antibody and thus it was presumed that she may have a diagnosis of Crohn's disease.    NSAIDs - previously heavy use, currently none, now using tylenol.    Dx with PsA , getting skyrizi q 10 weeks , 150mg.     Socially  Prior college [employer], softball and [employer]  Getting phd (5 years) at [facility] and so she moved down here    Family hx - paternal aunt has crohns.   Possibly paternal GF had crohns.    Prior Endoscopy:  EGD:   Reports upload by [employer] into separate message.  It seems she had mild gastritis , biopsies otherwise unremarkable.    Colon: never    (Portions of this note were dictated using voice recognition software and may contain dictation related errors in spelling/grammar/syntax not found on text review)    Review of Systems   Constitutional:  Negative for fever and malaise/fatigue.   Respiratory:  Negative for cough and shortness of breath.    Cardiovascular:  Negative for chest pain  and palpitations.   Gastrointestinal:  Positive for abdominal pain, diarrhea and nausea. Negative for blood in stool and vomiting.   Neurological:  Negative for dizziness and headaches.       Past Medical History: has a past medical history of Anxiety, Arthritis, Joint pain, Lyme disease, unspecified, Psoriasis, Skin disease, and Subclinical hypothyroidism.    Past Surgical History: has no past surgical history on file.    Family History:family history includes Acne in her father; Alcohol abuse in her father and mother; Asthma in her mother; Breast cancer in her paternal aunt and paternal grandmother; Depression in her brother; Diabetes in her maternal grandmother; Eczema in her brother; Heart attack in her paternal grandmother; Heart failure in her maternal grandfather; Lupus in her paternal aunt; Migraines in her father and paternal aunt; Psoriasis in her mother; Rheum arthritis in her mother; Suicidality in her brother.    Allergies:   Review of patient's allergies indicates:   Allergen Reactions    Azithromycin Hives, Rash and Swelling       Social History: reports that she has never smoked. She has never been exposed to tobacco smoke. She has never used smokeless tobacco. She reports that she does not currently use alcohol. She reports that she does not use drugs.    Home medications:   Current Outpatient Medications on File Prior to Visit   Medication Sig Dispense Refill    clobetasoL (TEMOVATE) 0.05 % external solution Apply topically once daily. Use to affected areas for up to 2 weeks then take a 1 week break or decrease to 3 times weekly. Do not apply to groin or face 50 mL 2    dicyclomine (BENTYL) 20 mg tablet Take 1 tablet (20 mg total) by mouth every 8 (eight) hours as needed (abdominal cramping). 120 tablet 0    fluocinolone acetonide oiL 0.01 % Drop Place 1 application in ear(s) once daily. Use to affected areas for up to 2 weeks then take a 1 week break or decrease to 3 times weekly. For use in ears  "20 mL 2    gabapentin (NEURONTIN) 100 MG capsule Take 100 mg by mouth daily as needed.      pantoprazole (PROTONIX) 40 MG tablet Take 1 tablet (40 mg total) by mouth once daily. 30 tablet 3    pregabalin (LYRICA) 75 MG capsule Take 1 capsule (75 mg total) by mouth every evening. 30 capsule 2    risankizumab-rzaa (SKYRIZI) 150 mg/mL PnIj       sertraline (ZOLOFT) 100 MG tablet       sertraline (ZOLOFT) 50 MG tablet Take 50 mg by mouth.      tretinoin (RETIN-A) 0.05 % cream Apply topically every evening. Start twice weekly and increase as tolerated. Pea sized amount to entire face. 20 g 3     No current facility-administered medications on file prior to visit.       Vital signs:  Ht 5' 8" (1.727 m)   Wt 68.8 kg (151 lb 10.8 oz)   BMI 23.06 kg/m²     Physical Exam  Vitals reviewed.   Constitutional:       General: She is not in acute distress.  HENT:      Head: Normocephalic and atraumatic.   Eyes:      General: No scleral icterus.     Conjunctiva/sclera: Conjunctivae normal.   Abdominal:      Comments: Soft but with tenderness lower quadrants, nonrigid, no rebound     Skin:     General: Skin is warm.      Coloration: Skin is not pale.      Findings: No rash.   Neurological:      Mental Status: She is oriented to person, place, and time.      Gait: Gait normal.   Psychiatric:         Mood and Affect: Mood normal.         Behavior: Behavior normal.         I have reviewed prior labs, imaging, notes from last month    Assessment:  1. Nausea and vomiting, unspecified vomiting type    2. Generalized abdominal pain    3. Diarrhea, unspecified type    4. Weight loss      Recently started skyrizi q 12 weeks 150mg, for PsA now with GI symptoms that flare nearing end of skyrizi dosing, suspicious for possible Crohns disease.  Her prometheus Ab was positive    Plan:  Orders Placed This Encounter    Calprotectin, Stool    sodium,potassium,mag sulfates (SUPREP BOWEL PREP KIT) 17.5-3.13-1.6 gram SolR    Case Request Endoscopy: " COLONOSCOPY     Check calprotectin    Schedule colonoscopy, this will be expedited.    RTC after above  We discussed the possibility of being able to increase the Skyrizi to 300mg and every 8 weeks if Crohns is diagnosed.    Stanton Ferrell MD  [facility] [place]

## 2023-08-11 NOTE — H&P (VIEW-ONLY)
GASTROENTEROLOGY CLINIC NOTE    Reason for visit: Diagnoses of Nausea and vomiting, unspecified vomiting type, Generalized abdominal pain, Diarrhea, unspecified type, and Weight loss were pertinent to this visit.  Referring provider/PCP: Nicol Shell MD    HPI:  Kiya Renae is a 23 y.o. female here today for abd pain , diarrhea, wt loss , new patient.  Previously seeing GI doc in [facility].    She is having 6 months or so of N/V and abdominal pain, evalauted by GI md in [facility] but recently moved here. Assoc changes bowels, no blood in stool, no aggrivating factors. Was taking levsin and zofran and protonix. Generalized pain lower abd , tender to touch.  EGD recently few months ago was normal. She was dx with PsA and started skyrizi.   Was getting jaskaran Raeann every 12 weeks but around weeks 11 and 12 she noticed worsening symptoms including joint pains but as well as flares of her bowel movements, and thus this was increased recently did every 10 weeks.  She had a Prometheus panel that was positive for sacchromyces antibody and thus it was presumed that she may have a diagnosis of Crohn's disease.    NSAIDs - previously heavy use, currently none, now using tylenol.    Dx with PsA , getting skyrizi q 10 weeks , 150mg.     Socially  Prior college [employer], softball and [employer]  Getting phd (5 years) at [employer] [facility] and so she moved down here    Family hx - paternal aunt has crohns.   Possibly paternal GF had crohns.    Prior Endoscopy:  EGD:   Reports upload by [employer] into separate message.  It seems she had mild gastritis , biopsies otherwise unremarkable.    Colon: never    (Portions of this note were dictated using voice recognition software and may contain dictation related errors in spelling/grammar/syntax not found on text review)    Review of Systems   Constitutional:  Negative for fever and malaise/fatigue.   Respiratory:  Negative for cough and shortness of breath.    Cardiovascular:  Negative for chest pain  and palpitations.   Gastrointestinal:  Positive for abdominal pain, diarrhea and nausea. Negative for blood in stool and vomiting.   Neurological:  Negative for dizziness and headaches.       Past Medical History: has a past medical history of Anxiety, Arthritis, Joint pain, Lyme disease, unspecified, Psoriasis, Skin disease, and Subclinical hypothyroidism.    Past Surgical History: has no past surgical history on file.    Family History:family history includes Acne in her father; Alcohol abuse in her father and mother; Asthma in her mother; Breast cancer in her paternal aunt and paternal grandmother; Depression in her brother; Diabetes in her maternal grandmother; Eczema in her brother; Heart attack in her paternal grandmother; Heart failure in her maternal grandfather; Lupus in her paternal aunt; Migraines in her father and paternal aunt; Psoriasis in her mother; Rheum arthritis in her mother; Suicidality in her brother.    Allergies:   Review of patient's allergies indicates:   Allergen Reactions    Azithromycin Hives, Rash and Swelling       Social History: reports that she has never smoked. She has never been exposed to tobacco smoke. She has never used smokeless tobacco. She reports that she does not currently use alcohol. She reports that she does not use drugs.    Home medications:   Current Outpatient Medications on File Prior to Visit   Medication Sig Dispense Refill    clobetasoL (TEMOVATE) 0.05 % external solution Apply topically once daily. Use to affected areas for up to 2 weeks then take a 1 week break or decrease to 3 times weekly. Do not apply to groin or face 50 mL 2    dicyclomine (BENTYL) 20 mg tablet Take 1 tablet (20 mg total) by mouth every 8 (eight) hours as needed (abdominal cramping). 120 tablet 0    fluocinolone acetonide oiL 0.01 % Drop Place 1 application in ear(s) once daily. Use to affected areas for up to 2 weeks then take a 1 week break or decrease to 3 times weekly. For use in ears  "20 mL 2    gabapentin (NEURONTIN) 100 MG capsule Take 100 mg by mouth daily as needed.      pantoprazole (PROTONIX) 40 MG tablet Take 1 tablet (40 mg total) by mouth once daily. 30 tablet 3    pregabalin (LYRICA) 75 MG capsule Take 1 capsule (75 mg total) by mouth every evening. 30 capsule 2    risankizumab-rzaa (SKYRIZI) 150 mg/mL PnIj       sertraline (ZOLOFT) 100 MG tablet       sertraline (ZOLOFT) 50 MG tablet Take 50 mg by mouth.      tretinoin (RETIN-A) 0.05 % cream Apply topically every evening. Start twice weekly and increase as tolerated. Pea sized amount to entire face. 20 g 3     No current facility-administered medications on file prior to visit.       Vital signs:  Ht 5' 8" (1.727 m)   Wt 68.8 kg (151 lb 10.8 oz)   BMI 23.06 kg/m²     Physical Exam  Vitals reviewed.   Constitutional:       General: She is not in acute distress.  HENT:      Head: Normocephalic and atraumatic.   Eyes:      General: No scleral icterus.     Conjunctiva/sclera: Conjunctivae normal.   Abdominal:      Comments: Soft but with tenderness lower quadrants, nonrigid, no rebound     Skin:     General: Skin is warm.      Coloration: Skin is not pale.      Findings: No rash.   Neurological:      Mental Status: She is oriented to person, place, and time.      Gait: Gait normal.   Psychiatric:         Mood and Affect: Mood normal.         Behavior: Behavior normal.         I have reviewed prior labs, imaging, notes from last month    Assessment:  1. Nausea and vomiting, unspecified vomiting type    2. Generalized abdominal pain    3. Diarrhea, unspecified type    4. Weight loss      Recently started skyrizi q 12 weeks 150mg, for PsA now with GI symptoms that flare nearing end of skyrizi dosing, suspicious for possible Crohns disease.  Her prometheus Ab was positive    Plan:  Orders Placed This Encounter    Calprotectin, Stool    sodium,potassium,mag sulfates (SUPREP BOWEL PREP KIT) 17.5-3.13-1.6 gram SolR    Case Request Endoscopy: " COLONOSCOPY     Check calprotectin    Schedule colonoscopy, this will be expedited.    RTC after above  We discussed the possibility of being able to increase the Skyrizi to 300mg and every 8 weeks if Crohns is diagnosed.    Stanton Ferrell MD  [facility] [place]

## 2023-08-18 ENCOUNTER — TELEPHONE (OUTPATIENT)
Dept: ENDOSCOPY | Facility: HOSPITAL | Age: 23
End: 2023-08-18
Payer: COMMERCIAL

## 2023-08-22 ENCOUNTER — HOSPITAL ENCOUNTER (OUTPATIENT)
Facility: HOSPITAL | Age: 23
Discharge: HOME | End: 2023-08-22
Attending: INTERNAL MEDICINE | Admitting: INTERNAL MEDICINE
Payer: COMMERCIAL

## 2023-08-22 ENCOUNTER — HOSPITAL ENCOUNTER (OUTPATIENT)
Dept: RADIOLOGY | Facility: HOSPITAL | Age: 23
Discharge: HOME | End: 2023-08-22
Attending: PSYCHIATRY & NEUROLOGY
Payer: COMMERCIAL

## 2023-08-22 ENCOUNTER — ANESTHESIA EVENT (OUTPATIENT)
Dept: ENDOSCOPY | Facility: HOSPITAL | Age: 23
End: 2023-08-22
Payer: COMMERCIAL

## 2023-08-22 ENCOUNTER — TELEPHONE (OUTPATIENT)
Dept: GASTROENTEROLOGY | Facility: CLINIC | Age: 23
End: 2023-08-22
Payer: COMMERCIAL

## 2023-08-22 ENCOUNTER — ANESTHESIA (OUTPATIENT)
Dept: ENDOSCOPY | Facility: HOSPITAL | Age: 23
End: 2023-08-22
Payer: COMMERCIAL

## 2023-08-22 VITALS
SYSTOLIC BLOOD PRESSURE: 117 MMHG | TEMPERATURE: 98 F | DIASTOLIC BLOOD PRESSURE: 62 MMHG | BODY MASS INDEX: 22.88 KG/M2 | WEIGHT: 151 LBS | RESPIRATION RATE: 16 BRPM | HEART RATE: 47 BPM | OXYGEN SATURATION: 100 % | HEIGHT: 68 IN

## 2023-08-22 DIAGNOSIS — R10.9 ABDOMINAL PAIN: ICD-10-CM

## 2023-08-22 DIAGNOSIS — G93.2 IIH (IDIOPATHIC INTRACRANIAL HYPERTENSION): ICD-10-CM

## 2023-08-22 LAB
B-HCG UR QL: NEGATIVE
CTP QC/QA: YES

## 2023-08-22 PROCEDURE — 37000009 HC ANESTHESIA EA ADD 15 MINS: Performed by: INTERNAL MEDICINE

## 2023-08-22 PROCEDURE — D9220A PRA ANESTHESIA: Mod: CRNA,,, | Performed by: STUDENT IN AN ORGANIZED HEALTH CARE EDUCATION/TRAINING PROGRAM

## 2023-08-22 PROCEDURE — 81025 URINE PREGNANCY TEST: CPT | Performed by: INTERNAL MEDICINE

## 2023-08-22 PROCEDURE — 70551 MRI BRAIN WITHOUT CONTRAST: ICD-10-PCS | Mod: 26,,, | Performed by: RADIOLOGY

## 2023-08-22 PROCEDURE — 70551 MRI BRAIN STEM W/O DYE: CPT | Mod: 26,,, | Performed by: RADIOLOGY

## 2023-08-22 PROCEDURE — 25000003 PHARM REV CODE 250: Performed by: INTERNAL MEDICINE

## 2023-08-22 PROCEDURE — 25000003 PHARM REV CODE 250: Performed by: STUDENT IN AN ORGANIZED HEALTH CARE EDUCATION/TRAINING PROGRAM

## 2023-08-22 PROCEDURE — 63600175 PHARM REV CODE 636 W HCPCS: Performed by: STUDENT IN AN ORGANIZED HEALTH CARE EDUCATION/TRAINING PROGRAM

## 2023-08-22 PROCEDURE — D9220A PRA ANESTHESIA: ICD-10-PCS | Mod: ANES,,, | Performed by: STUDENT IN AN ORGANIZED HEALTH CARE EDUCATION/TRAINING PROGRAM

## 2023-08-22 PROCEDURE — 45380 COLONOSCOPY AND BIOPSY: CPT | Mod: ,,, | Performed by: INTERNAL MEDICINE

## 2023-08-22 PROCEDURE — D9220A PRA ANESTHESIA: ICD-10-PCS | Mod: CRNA,,, | Performed by: STUDENT IN AN ORGANIZED HEALTH CARE EDUCATION/TRAINING PROGRAM

## 2023-08-22 PROCEDURE — 88305 TISSUE EXAM BY PATHOLOGIST: ICD-10-PCS | Mod: 26,,, | Performed by: PATHOLOGY

## 2023-08-22 PROCEDURE — D9220A PRA ANESTHESIA: Mod: ANES,,, | Performed by: STUDENT IN AN ORGANIZED HEALTH CARE EDUCATION/TRAINING PROGRAM

## 2023-08-22 PROCEDURE — 70551 MRI BRAIN STEM W/O DYE: CPT | Mod: TC

## 2023-08-22 PROCEDURE — 88305 TISSUE EXAM BY PATHOLOGIST: CPT | Performed by: PATHOLOGY

## 2023-08-22 PROCEDURE — 37000008 HC ANESTHESIA 1ST 15 MINUTES: Performed by: INTERNAL MEDICINE

## 2023-08-22 PROCEDURE — 45380 PR COLONOSCOPY,BIOPSY: ICD-10-PCS | Mod: ,,, | Performed by: INTERNAL MEDICINE

## 2023-08-22 PROCEDURE — 45380 COLONOSCOPY AND BIOPSY: CPT | Performed by: INTERNAL MEDICINE

## 2023-08-22 PROCEDURE — 88305 TISSUE EXAM BY PATHOLOGIST: CPT | Mod: 26,,, | Performed by: PATHOLOGY

## 2023-08-22 PROCEDURE — 27201012 HC FORCEPS, HOT/COLD, DISP: Performed by: INTERNAL MEDICINE

## 2023-08-22 RX ORDER — SODIUM CHLORIDE 0.9 % (FLUSH) 0.9 %
10 SYRINGE (ML) INJECTION
Status: DISCONTINUED | OUTPATIENT
Start: 2023-08-22 | End: 2023-08-22 | Stop reason: HOSPADM

## 2023-08-22 RX ORDER — SODIUM CHLORIDE 9 MG/ML
INJECTION, SOLUTION INTRAVENOUS CONTINUOUS
Status: DISCONTINUED | OUTPATIENT
Start: 2023-08-22 | End: 2023-08-22 | Stop reason: HOSPADM

## 2023-08-22 RX ORDER — PROPOFOL 10 MG/ML
VIAL (ML) INTRAVENOUS
Status: DISCONTINUED | OUTPATIENT
Start: 2023-08-22 | End: 2023-08-22

## 2023-08-22 RX ORDER — PROPOFOL 10 MG/ML
VIAL (ML) INTRAVENOUS CONTINUOUS PRN
Status: DISCONTINUED | OUTPATIENT
Start: 2023-08-22 | End: 2023-08-22

## 2023-08-22 RX ORDER — LIDOCAINE HYDROCHLORIDE 20 MG/ML
INJECTION INTRAVENOUS
Status: DISCONTINUED | OUTPATIENT
Start: 2023-08-22 | End: 2023-08-22

## 2023-08-22 RX ADMIN — PROPOFOL 50 MG: 10 INJECTION, EMULSION INTRAVENOUS at 02:08

## 2023-08-22 RX ADMIN — PROPOFOL 70 MG: 10 INJECTION, EMULSION INTRAVENOUS at 02:08

## 2023-08-22 RX ADMIN — PROPOFOL 175 MCG/KG/MIN: 10 INJECTION, EMULSION INTRAVENOUS at 02:08

## 2023-08-22 RX ADMIN — PROPOFOL 100 MG: 10 INJECTION, EMULSION INTRAVENOUS at 02:08

## 2023-08-22 RX ADMIN — SODIUM CHLORIDE: 0.9 INJECTION, SOLUTION INTRAVENOUS at 01:08

## 2023-08-22 RX ADMIN — PROPOFOL 150 MG: 10 INJECTION, EMULSION INTRAVENOUS at 02:08

## 2023-08-22 RX ADMIN — PROPOFOL 60 MG: 10 INJECTION, EMULSION INTRAVENOUS at 02:08

## 2023-08-22 RX ADMIN — LIDOCAINE HYDROCHLORIDE 100 MG: 20 INJECTION, SOLUTION INTRAVENOUS at 02:08

## 2023-08-22 NOTE — TRANSFER OF CARE
"Anesthesia Transfer of Care Note    Patient: Kiya Renae    Procedure(s) Performed: Procedure(s) (LRB):  COLONOSCOPY (N/A)    Patient location: GI    Anesthesia Type: general    Transport from OR: Transported from OR on room air with adequate spontaneous ventilation    Post pain: adequate analgesia    Post assessment: no apparent anesthetic complications and tolerated procedure well    Post vital signs: stable    Level of consciousness: awake and responds to stimulation    Nausea/Vomiting: no nausea/vomiting    Complications: none    Transfer of care protocol was followed      Last vitals:   Visit Vitals  /82 (BP Location: Left arm, Patient Position: Lying)   Pulse (!) 58   Temp 37.2 °C (99 °F) (Skin)   Resp 18   Ht 5' 8" (1.727 m)   Wt 68.5 kg (151 lb)   SpO2 98%   Breastfeeding No   BMI 22.96 kg/m²     "

## 2023-08-22 NOTE — ANESTHESIA PREPROCEDURE EVALUATION
08/22/2023     Kiya Renae is a 23 y.o., female here for Colonoscopy    Past Medical History:   Diagnosis Date    Anxiety 07/31/2021    Arthritis 12/30/2021    PsA    Joint pain     Lyme disease, unspecified     Psoriasis     Skin disease 12/30/2021    psorias on scalp, behind ears, and inside ear canal    Subclinical hypothyroidism 7/5/2023           Pre-op Assessment    I have reviewed the Patient Summary Reports.     I have reviewed the Nursing Notes. I have reviewed the NPO Status.      Review of Systems  Anesthesia Hx:  History of prior surgery of interest to airway management or planning:  Denies Personal Hx of Anesthesia complications.   Cardiovascular:   Exercise tolerance: good    Neurological:   Headaches    Endocrine:   Hypothyroidism    Psych:   Psychiatric History          Physical Exam  General: Well nourished    Airway:  Mallampati: II   Mouth Opening: Normal  Neck ROM: Normal ROM  Pre-Existing Airway: Oral Endotracheal tube        Anesthesia Plan  Type of Anesthesia, risks & benefits discussed:    Anesthesia Type: Gen Natural Airway  Informed Consent: Informed consent signed with the Patient and all parties understand the risks and agree with anesthesia plan.  All questions answered.   ASA Score: 2    Ready For Surgery From Anesthesia Perspective.     .

## 2023-08-22 NOTE — PR[employer]ATION PATIENT INSTRUCTIONS
Discharge Summary/Instructions after an Endoscopic Procedure  Patient Name: Kiya Renae  Patient MRN: [id number]  Patient YOB: 2000 Tuesday, August 22, 2023  Stanton Ferrell MD  Dear patient,  As a result of recent federal legislation (The Federal Cures Act), you may   receive lab or pathology results from your procedure in your [facility]   account before your physician is able to contact you. Your physician or   their representative will relay the results to you with their   recommendations at their soonest availability.  Thank you,  Your health is very important to us during the Covid Crisis. Following your   procedure today, you will receive a daily text for 2 weeks asking about   signs or symptoms of Covid 19.  Please respond to this text when you   receive it so we can follow up and keep you as safe as possible.   RESTRICTIONS:  During your procedure today, you received medications for sedation.  These   medications may affect your judgment, balance and coordination.  Therefore,   for 24 hours, you have the following restrictions:   - DO NOT drive a car, operate machinery, make legal/financial decisions,   sign important papers or drink alcohol.    ACTIVITY:  Today: no heavy lifting, straining or running due to procedural   sedation/anesthesia.  The following day: return to full activity including work.  DIET:  Eat and drink normally unless instructed otherwise.     TREATMENT FOR COMMON SIDE EFFECTS:  - Mild abdominal pain, nausea, belching, bloating or excessive gas:  rest,   eat lightly and use a heating pad.  - Sore Throat: treat with throat lozenges and/or gargle with warm salt   water.  - Because air was used during the procedure, expelling large amounts of air   from your rectum or belching is normal.  - If a bowel prep was taken, you may not have a bowel movement for 1-3 days.    This is normal.  SYMPTOMS TO WATCH FOR AND REPORT TO YOUR PHYSICIAN:  1. Abdominal pain or bloating, other than gas  cramps.  2. Chest pain.  3. Back pain.  4. Signs of infection such as: chills or fever occurring within 24 hours   after the procedure.  5. Rectal bleeding, which would show as bright red, maroon, or black stools.   (A tablespoon of blood from the rectum is not serious, especially if   hemorrhoids are present.)  6. Vomiting.  7. Weakness or dizziness.  GO DIRECTLY TO THE NEAREST EMERGENCY ROOM IF YOU HAVE ANY OF THE FOLLOWING:      Difficulty breathing              Chills and/or fever over 101 F   Persistent vomiting and/or vomiting blood   Severe abdominal pain   Severe chest pain   Black, tarry stools   Bleeding- more than one tablespoon   Any other symptom or condition that you feel may need urgent attention  Your doctor recommends these additional instructions:  If any biopsies were taken, your doctors clinic will contact you in 1 to 2   weeks with any results.  - Discharge patient to home.   - Patient has a contact number available for emergencies.  The signs and   symptoms of potential delayed complications were discussed with the   patient.  Return to normal activities tomorrow.  Written discharge   instructions were provided to the patient.   - Resume previous diet.   - Continue present medications.   - Await pathology results.   - Repeat colonoscopy at age 45 for screening purposes.   - I dont see any evidence to support something like IBD / Crohns.  For questions, problems or results please call your physician - Stanton Ferrell MD.  EMERGENCY PHONE NUMBER: [phone],  LAB RESULTS: [phone]  IF A COMPLICATION OR EMERGENCY SITUATION ARISES AND YOU ARE UNABLE TO REACH   YOUR PHYSICIAN - GO DIRECTLY TO THE EMERGENCY ROOM.  Stanton Ferrell MD  8/22/2023 2:38:57 PM  This report has been verified and signed electronically.  Dear patient,  As a result of recent federal legislation (The Federal Cures Act), you may   receive lab or pathology results from your procedure in your [facility]   account  before your physician is able to contact you. Your physician or   their representative will relay the results to you with their   recommendations at their soonest availability.  Thank you,  [employer]

## 2023-08-23 NOTE — ANESTHESIA POSTPROCEDURE EVALUATION
Anesthesia Post Evaluation    Patient: Kiya Renae    Procedure(s) Performed: Procedure(s) (LRB):  COLONOSCOPY (N/A)    Final Anesthesia Type: general      Patient location during evaluation: GI PACU  Patient participation: Yes- Able to Participate  Level of consciousness: awake and alert, oriented and awake  Post-procedure vital signs: reviewed and stable  Pain management: adequate  Airway patency: patent  SANCHEZ mitigation strategies: Multimodal analgesia  PONV status at discharge: No PONV  Anesthetic complications: no      Cardiovascular status: blood pressure returned to baseline and hemodynamically stable  Respiratory status: unassisted and spontaneous ventilation  Hydration status: euvolemic  Follow-up not needed.          Vitals Value Taken Time   /62 08/22/23 1514   Temp 36.8 °C (98.2 °F) 08/22/23 1444   Pulse 47 08/22/23 1514   Resp 16 08/22/23 1514   SpO2 100 % 08/22/23 1514         Event Time   Out of Recovery 15:14:00         Pain/Trinity Score: Trinity Score: 10 (8/22/2023  3:14 PM)

## 2023-08-25 LAB
FINAL PATHOLOGIC DIAGNOSIS: NORMAL
GROSS: NORMAL
Lab: NORMAL

## 2023-08-31 ENCOUNTER — TELEPHONE (OUTPATIENT)
Dept: GASTROENTEROLOGY | Facility: CLINIC | Age: 23
End: 2023-08-31
Payer: COMMERCIAL

## 2023-08-31 DIAGNOSIS — L70.9 ACNE, UNSPECIFIED ACNE TYPE: ICD-10-CM

## 2023-09-01 RX ORDER — TRETINOIN 0.5 MG/G
CREAM TOPICAL NIGHTLY
Qty: 20 G | Refills: 3 | Status: SHIPPED | OUTPATIENT
Start: 2023-09-01 | End: 2023-12-03 | Stop reason: SDUPTHER

## 2023-09-05 ENCOUNTER — HOSPITAL ENCOUNTER (EMERGENCY)
Facility: HOSPITAL | Age: 23
Discharge: HOME | End: 2023-09-06
Attending: STUDENT IN AN ORGANIZED HEALTH CARE EDUCATION/TRAINING PROGRAM
Payer: COMMERCIAL

## 2023-09-05 DIAGNOSIS — R56.9 SEIZURE-LIKE ACTIVITY: Primary | ICD-10-CM

## 2023-09-05 LAB
ALBUMIN SERPL BCP-MCNC: 4.5 G/DL (ref 3.5–5.2)
ALP SERPL-CCNC: 65 U/L (ref 55–135)
ALT SERPL W/O P-5'-P-CCNC: 8 U/L (ref 10–44)
AMPHET+METHAMPHET UR QL: NEGATIVE
ANION GAP SERPL CALC-SCNC: 11 MMOL/L (ref 8–16)
AST SERPL-CCNC: 13 U/L (ref 10–40)
B-HCG UR QL: NEGATIVE
BACTERIA #/AREA URNS HPF: NORMAL /HPF
BARBITURATES UR QL SCN>200 NG/ML: NEGATIVE
BASOPHILS # BLD AUTO: 0.05 K/UL (ref 0–0.2)
BASOPHILS NFR BLD: 0.5 % (ref 0–1.9)
BENZODIAZ UR QL SCN>200 NG/ML: NEGATIVE
BILIRUB SERPL-MCNC: 0.2 MG/DL (ref 0.1–1)
BILIRUB UR QL STRIP: NEGATIVE
BUN SERPL-MCNC: 17 MG/DL (ref 6–20)
BZE UR QL SCN: NEGATIVE
CALCIUM SERPL-MCNC: 9.6 MG/DL (ref 8.7–10.5)
CANNABINOIDS UR QL SCN: NEGATIVE
CHLORIDE SERPL-SCNC: 108 MMOL/L (ref 95–110)
CLARITY UR: CLEAR
CO2 SERPL-SCNC: 23 MMOL/L (ref 23–29)
COLOR UR: YELLOW
CREAT SERPL-MCNC: 1.1 MG/DL (ref 0.5–1.4)
CREAT UR-MCNC: 168.4 MG/DL (ref 15–325)
CTP QC/QA: YES
DIFFERENTIAL METHOD: ABNORMAL
EOSINOPHIL # BLD AUTO: 0.3 K/UL (ref 0–0.5)
EOSINOPHIL NFR BLD: 2.6 % (ref 0–8)
ERYTHROCYTE [DISTWIDTH] IN BLOOD BY AUTOMATED COUNT: 12.1 % (ref 11.5–14.5)
EST. GFR  (NO RACE VARIABLE): >60 ML/MIN/1.73 M^2
GLUCOSE SERPL-MCNC: 72 MG/DL (ref 70–110)
GLUCOSE UR QL STRIP: NEGATIVE
HCT VFR BLD AUTO: 41 % (ref 37–48.5)
HGB BLD-MCNC: 14.2 G/DL (ref 12–16)
HGB UR QL STRIP: NEGATIVE
IMM GRANULOCYTES # BLD AUTO: 0.04 K/UL (ref 0–0.04)
IMM GRANULOCYTES NFR BLD AUTO: 0.4 % (ref 0–0.5)
KETONES UR QL STRIP: NEGATIVE
LEUKOCYTE ESTERASE UR QL STRIP: ABNORMAL
LYMPHOCYTES # BLD AUTO: 1.8 K/UL (ref 1–4.8)
LYMPHOCYTES NFR BLD: 18.2 % (ref 18–48)
MAGNESIUM SERPL-MCNC: 2 MG/DL (ref 1.6–2.6)
MCH RBC QN AUTO: 30.9 PG (ref 27–31)
MCHC RBC AUTO-ENTMCNC: 34.6 G/DL (ref 32–36)
MCV RBC AUTO: 89 FL (ref 82–98)
METHADONE UR QL SCN>300 NG/ML: NEGATIVE
MICROSCOPIC COMMENT: NORMAL
MONOCYTES # BLD AUTO: 1.1 K/UL (ref 0.3–1)
MONOCYTES NFR BLD: 11 % (ref 4–15)
NEUTROPHILS # BLD AUTO: 6.5 K/UL (ref 1.8–7.7)
NEUTROPHILS NFR BLD: 67.3 % (ref 38–73)
NITRITE UR QL STRIP: NEGATIVE
NRBC BLD-RTO: 0 /100 WBC
OPIATES UR QL SCN: NEGATIVE
PCP UR QL SCN>25 NG/ML: NEGATIVE
PH UR STRIP: 7 [PH] (ref 5–8)
PHOSPHATE SERPL-MCNC: 3.7 MG/DL (ref 2.7–4.5)
PLATELET # BLD AUTO: 232 K/UL (ref 150–450)
PMV BLD AUTO: 12.4 FL (ref 9.2–12.9)
POTASSIUM SERPL-SCNC: 3.9 MMOL/L (ref 3.5–5.1)
PROT SERPL-MCNC: 7.2 G/DL (ref 6–8.4)
PROT UR QL STRIP: ABNORMAL
RBC # BLD AUTO: 4.6 M/UL (ref 4–5.4)
RBC #/AREA URNS HPF: 1 /HPF (ref 0–4)
SODIUM SERPL-SCNC: 142 MMOL/L (ref 136–145)
SP GR UR STRIP: >1.03 (ref 1–1.03)
SQUAMOUS #/AREA URNS HPF: 1 /HPF
TOXICOLOGY INFORMATION: NORMAL
URN SPEC COLLECT METH UR: ABNORMAL
UROBILINOGEN UR STRIP-ACNC: NEGATIVE EU/DL
WBC # BLD AUTO: 9.62 K/UL (ref 3.9–12.7)
WBC #/AREA URNS HPF: 5 /HPF (ref 0–5)

## 2023-09-05 PROCEDURE — 84443 ASSAY THYROID STIM HORMONE: CPT | Performed by: STUDENT IN AN ORGANIZED HEALTH CARE EDUCATION/TRAINING PROGRAM

## 2023-09-05 PROCEDURE — 84439 ASSAY OF FREE THYROXINE: CPT | Performed by: STUDENT IN AN ORGANIZED HEALTH CARE EDUCATION/TRAINING PROGRAM

## 2023-09-05 PROCEDURE — 81025 URINE PREGNANCY TEST: CPT | Performed by: STUDENT IN AN ORGANIZED HEALTH CARE EDUCATION/TRAINING PROGRAM

## 2023-09-05 PROCEDURE — 80053 COMPREHEN METABOLIC PANEL: CPT | Performed by: STUDENT IN AN ORGANIZED HEALTH CARE EDUCATION/TRAINING PROGRAM

## 2023-09-05 PROCEDURE — 99284 EMERGENCY DEPT VISIT MOD MDM: CPT | Mod: 25

## 2023-09-05 PROCEDURE — 80307 DRUG TEST PRSMV CHEM ANLYZR: CPT | Performed by: STUDENT IN AN ORGANIZED HEALTH CARE EDUCATION/TRAINING PROGRAM

## 2023-09-05 PROCEDURE — 81000 URINALYSIS NONAUTO W/SCOPE: CPT | Mod: 59 | Performed by: STUDENT IN AN ORGANIZED HEALTH CARE EDUCATION/TRAINING PROGRAM

## 2023-09-05 PROCEDURE — 85025 COMPLETE CBC W/AUTO DIFF WBC: CPT | Performed by: STUDENT IN AN ORGANIZED HEALTH CARE EDUCATION/TRAINING PROGRAM

## 2023-09-05 PROCEDURE — 84100 ASSAY OF PHOSPHORUS: CPT | Performed by: STUDENT IN AN ORGANIZED HEALTH CARE EDUCATION/TRAINING PROGRAM

## 2023-09-05 PROCEDURE — 83735 ASSAY OF MAGNESIUM: CPT | Performed by: STUDENT IN AN ORGANIZED HEALTH CARE EDUCATION/TRAINING PROGRAM

## 2023-09-06 VITALS
SYSTOLIC BLOOD PRESSURE: 128 MMHG | HEIGHT: 68 IN | BODY MASS INDEX: 22.73 KG/M2 | OXYGEN SATURATION: 98 % | HEART RATE: 76 BPM | TEMPERATURE: 98 F | WEIGHT: 150 LBS | DIASTOLIC BLOOD PRESSURE: 73 MMHG | RESPIRATION RATE: 16 BRPM

## 2023-09-06 LAB
AMMONIA PLAS-SCNC: 23 UMOL/L (ref 10–50)
T4 FREE SERPL-MCNC: 0.93 NG/DL (ref 0.71–1.51)
TSH SERPL DL<=0.005 MIU/L-ACNC: 7.25 UIU/ML (ref 0.4–4)

## 2023-09-06 PROCEDURE — 82140 ASSAY OF AMMONIA: CPT | Performed by: STUDENT IN AN ORGANIZED HEALTH CARE EDUCATION/TRAINING PROGRAM

## 2023-09-06 NOTE — DISCHARGE INSTRUCTIONS
YOU MUST NOT DRIVE, SWIM, TAKE BATHS, CLIMB UP TO HEIGHT, OR ENGAGE IN ANY ACTIVITY WHICH COULD BE FATAL OR INJURE YOU OR THOSE AROUND YOU IF YOU WERE TO SEIZE WHILE PERFORMING THEM.    Thank you for coming to our Emergency Department today. It is important to remember that some problems are difficult to diagnose and may not be found during your first visit. Be sure to follow up with your primary care doctor and review any labs/imaging that was performed with them. If you do not have a primary care doctor, you may contact the one listed on your discharge paperwork or you may also call the [facility] Appointment Desk at [phone] to schedule an appointment with one.     All medications may potentially have side effects and it is impossible to predict which medications may give you side effects. If you feel that you are having a negative effect of any medication you should immediately stop taking them and seek medical attention.    Return to the ER with any questions/concerns, new/concerning symptoms, worsening or failure to improve. Do not drive or make any important decisions for 24 hours if you have received any pain medications, sedatives or mood altering drugs during your ER visit.

## 2023-09-06 NOTE — ED PROVIDER NOTES
Encounter Date: 9/5/2023       History     Chief Complaint   Patient presents with    Seizures     PT with focal seizures that have been increasing over the last two weeks. PT fears she will soon have a full seizure soon.      23 y.o. female who  has a past medical history of Anxiety, Arthritis, Joint pain, Lyme disease, unspecified, Psoriasis, Skin disease, and Subclinical hypothyroidism who presents to the emergency department with increased focal seizures for the past 2 weeks.  Patient reports having symptoms since 2020 but has not had multiple in a span of 2 weeks.  She states she was seen by a neurologist earlier last month in which she obtained an MRI that was unremarkable.  She reports 2 weeks ago she had a colonoscopy and was diagnosed with Crohn's disease and ever since the focal seizures have increased frequency.    Seizures described as shaking/spastic episodes that began in the left upper extremity and generalized to the lower extremity.  She reports having headache prior to the onset and feeling like she has a foggy brain.  Denies any loss of consciousness urinary or bowel incontinence prior or after to the onset of her symptoms.    The history is provided by the patient.     Review of patient's allergies indicates:   Allergen Reactions    Azithromycin Hives, Rash and Swelling     Past Medical History:   Diagnosis Date    Anxiety 07/31/2021    Arthritis 12/30/2021    PsA    Joint pain     Lyme disease, unspecified     Psoriasis     Skin disease 12/30/2021    psorias on scalp, behind ears, and inside ear canal    Subclinical hypothyroidism 7/5/2023     Past Surgical History:   Procedure Laterality Date    COLONOSCOPY N/A 8/22/2023    Procedure: COLONOSCOPY;  Surgeon: Stanton Ferrell MD;  Location: [facility];  Service: Endoscopy;  Laterality: N/A;     Family History   Problem Relation Age of Onset    Alcohol abuse Mother     Asthma Mother     Rheum arthritis Mother     Psoriasis Mother     Alcohol abuse  Father     Migraines Father     Acne Father     Depression Brother     Suicidality Brother     Eczema Brother     Diabetes Maternal Grandmother     Heart failure Maternal Grandfather     Breast cancer Paternal Grandmother     Heart attack Paternal Grandmother     Breast cancer Paternal Aunt     Lupus Paternal Aunt     Migraines Paternal Aunt      Social History     Tobacco Use    Smoking status: Never     Passive exposure: Never    Smokeless tobacco: Never   Substance Use Topics    Alcohol use: Not Currently     Comment: only socially and a very small amount if ever (one or two )    Drug use: Never     Review of Systems   Constitutional:  Negative for chills and fever.   HENT:  Negative for congestion and rhinorrhea.    Eyes:  Negative for pain.   Respiratory:  Negative for cough and shortness of breath.    Cardiovascular:  Negative for chest pain and leg swelling.   Gastrointestinal:  Negative for abdominal pain, nausea and vomiting.   Endocrine: Negative for polyuria.   Genitourinary:  Negative for dysuria and hematuria.   Musculoskeletal:  Negative for gait problem and neck pain.   Skin:  Negative for rash.   Allergic/Immunologic: Negative for immunocompromised state.   Neurological:  Positive for tremors, seizures and headaches. Negative for weakness.       Physical Exam     Initial Vitals   BP Pulse Resp Temp SpO2   09/05/23 2109 09/05/23 2107 09/05/23 2107 09/05/23 2107 09/05/23 2107   134/64 82 16 98.4 °F (36.9 °C) 99 %      MAP       --                Physical Exam    Nursing note and vitals reviewed.  Constitutional: She is not diaphoretic. No distress.   HENT:   Head: Normocephalic and atraumatic.   Eyes: Conjunctivae and EOM are normal. Pupils are equal, round, and reactive to light.   Neck:   Normal range of motion.  Cardiovascular:  Regular rhythm.           Pulmonary/Chest: Breath sounds normal. No respiratory distress.   Abdominal: Abdomen is soft. Bowel sounds are normal. She exhibits no distension.  There is no abdominal tenderness. There is no rebound and no guarding.   Musculoskeletal:         General: No tenderness. Normal range of motion.      Cervical back: Normal range of motion.     Lymphadenopathy:     She has no cervical adenopathy.   Neurological: She is alert.   Moves all extremities and carries on conversation. CN- II: PERRL; III/IV/VI: EOMI w/out evidence of nystagmus; V: no deficits appreciated to light touch bilateral face; VII: no facial weakness, no facial asymmetry. Eyebrow raise symmetric. Smile symmetric; IX/X: palate midline, and raises symmetrically; XI: shoulder shrug 5/5 bilaterally; XII: tongue is midline w/out asymmetry. Strength 5/5 to bilateral upper and lower extremities, sensation intact to light touch,   Skin: Skin is warm. Capillary refill takes less than 2 seconds.   Psychiatric: She has a normal mood and affect. Her behavior is normal.         ED Course   Procedures  Labs Reviewed   CBC W/ AUTO DIFFERENTIAL - Abnormal; Notable for the following components:       Result Value    Mono # 1.1 (*)     All other components within normal limits   COMPREHENSIVE METABOLIC PANEL - Abnormal; Notable for the following components:    ALT 8 (*)     All other components within normal limits   URINALYSIS, REFLEX TO URINE CULTURE - Abnormal; Notable for the following components:    Specific Gravity, UA >1.030 (*)     Protein, UA Trace (*)     Leukocytes, UA 1+ (*)     All other components within normal limits    Narrative:     Specimen Source->Urine   TSH - Abnormal; Notable for the following components:    TSH 7.255 (*)     All other components within normal limits   MAGNESIUM   DRUG SCREEN PANEL, URINE EMERGENCY    Narrative:     Specimen Source->Urine   PHOSPHORUS   URINALYSIS MICROSCOPIC    Narrative:     Specimen Source->Urine   AMMONIA   T4, FREE   POCT URINE PREGNANCY          Imaging Results              CT Head Without Contrast (Final result)  Result time 09/05/23 23:04:02      Final  result by Jan Amezquita DO (09/05/23 23:04:02)                   Impression:      No acute intracranial abnormality.      Electronically signed by: Jan Amezquita  Date:    09/05/2023  Time:    23:04               Narrative:    EXAMINATION:  CT HEAD WITHOUT CONTRAST    CLINICAL HISTORY:  Headache, chronic, new features or increased frequency;    TECHNIQUE:  Low dose axial CT images obtained throughout the head without intravenous contrast. Sagittal and coronal reconstructions were performed.    COMPARISON:  MRI brain from 08/22/2023.    FINDINGS:  Ventricles and sulci are normal in size for age without evidence of hydrocephalus. No extra-axial blood or fluid collections.  The brain parenchyma is normal. No parenchymal mass, hemorrhage, edema or major vascular distribution infarct.    No calvarial fracture.  The scalp is unremarkable.  Bilateral paranasal sinuses and mastoid air cells are clear.                        Final result by Jan Amezquita DO (09/05/23 23:04:02)                   Impression:      No acute intracranial abnormality.      Electronically signed by: Jan Amezquita  Date:    09/05/2023  Time:    23:04               Narrative:    EXAMINATION:  CT HEAD WITHOUT CONTRAST    CLINICAL HISTORY:  Headache, chronic, new features or increased frequency;    TECHNIQUE:  Low dose axial CT images obtained throughout the head without intravenous contrast. Sagittal and coronal reconstructions were performed.    COMPARISON:  MRI brain from 08/22/2023.    FINDINGS:  Ventricles and sulci are normal in size for age without evidence of hydrocephalus. No extra-axial blood or fluid collections.  The brain parenchyma is normal. No parenchymal mass, hemorrhage, edema or major vascular distribution infarct.    No calvarial fracture.  The scalp is unremarkable.  Bilateral paranasal sinuses and mastoid air cells are clear.                                       Medications - No data to display  Medical Decision Making:    History:   Old Medical Records: I decided to obtain old medical records.  Initial Assessment:   23 y.o. female who  has a past medical history of Anxiety, Arthritis, Joint pain, Lyme disease, unspecified, Psoriasis, Skin disease, and Subclinical hypothyroidism who presents to the emergency department with increased focal seizures for the past 2 weeks.  Patient reports having symptoms since 2020 but has not had multiple in a span of 2 weeks.  Patient in no acute distress, no focal neurological deficits on exam.  Per review of records MRI obtained on 8/8 without any acute intracranial abnormalities.  Concern for possibilities a complex migraine given headache prior to onset her symptoms.  Other things on the differential include electrolyte abnormalities so will obtain labs and reassess  Differential Diagnosis:   Differential Diagnosis includes, but is not limited to:  intracranial lesion/mass, seizure, perforated viscous, ruptured AAA, orthostatic hypotension, vasovagal episode, anemia, dehydration, medication reaction, intentional overdose      Clinical Tests:   Lab Tests: Ordered and Reviewed  Radiological Study: Ordered and Reviewed       - Prior records were evaluated and considered a differential diagnosis  - I have independently evaluated and interpreted all available labs and imaging to the extent of the scope of my practice.  - Co-morbidities and Social determinants of health have been taken into consideration during development of our treatment plan and include anxiety, seizure disorder, subclinical hypothyroidism..        ED Course as of 09/06/23 0104   Tue Sep 05, 2023   2324 CBC without significant leukocytosis, anemia (at baseline), or platelet abnormalities.  CT head without any acute abnormalities  [AS]   2358 Chem 14 negative for hypo-or hyper natremia, kalemia , chloridemia, or other electrolyte abnormalities; BUN and creatinine (at baseline), ALT and AST were within normal limits indicating normal  liver function.  Magnesium, phosphorus within normal limits.  UA without signs of infection  UDS negative [AS]   Wed Sep 06, 2023   0102 Ammonia within normal limits, TSH elevated free T4 within normal limits.  Patient without any reported recurrence of focal seizure while in the emergency department. Pt is currently stable for discharge.   I see no indication of an emergent process beyond that addressed during our encounter but have duly counseled the patient/family regarding the need for prompt follow-up as well as the indications that should prompt immediate return to the emergency room should new or worrisome developments occur. I discussed the ED work up and diagnostic findings with the patient. The patient/family has been provided with verbal and printed direction regarding our final diagnosis(es) as well as instructions regarding use of OTC and/or Rx medications intended to manage the patient's aforementioned conditions. The patient/family verbalized an understanding. The patient/family is asked if there are any questions or concerns. We discuss the case, until all issues are addressed to the patient/family's satisfaction. Patient/family understands and is agreeable to the plan.  [AS]      ED Course User Index  [AS] Ramy Suarez MD                 Clinical Impression:   Final diagnoses:  [R56.9] Seizure-like activity (Primary)          ED Disposition Condition    Discharge Stable          ED Prescriptions    None       Follow-up Information       Follow up With Specialties Details Why Contact Info    Nicol Shell MD Internal Medicine Schedule an appointment as soon as possible for a visit  for reassesment [address] [facility] [address]  [phone]      [facility] Emergency Dept Emergency Medicine  If symptoms worsen [address] [facility] [id number] [phone]    Neurology   for reassesment             DISCLAIMER: This note was prepared with MModal voice recognition  transcription software. Garbled syntax, mangled pronouns, and other bizarre constructions may be attributed to that software system.     Ramy Suarez MD  09/06/23 0104

## 2023-09-11 ENCOUNTER — PATIENT MESSAGE (OUTPATIENT)
Dept: PRIMARY CARE CLINIC | Facility: CLINIC | Age: 23
End: 2023-09-11

## 2023-09-11 ENCOUNTER — TELEPHONE (OUTPATIENT)
Dept: NEUROLOGY | Facility: CLINIC | Age: 23
End: 2023-09-11
Payer: COMMERCIAL

## 2023-09-11 ENCOUNTER — OFFICE VISIT (OUTPATIENT)
Dept: PRIMARY CARE CLINIC | Facility: CLINIC | Age: 23
End: 2023-09-11
Payer: COMMERCIAL

## 2023-09-11 VITALS
OXYGEN SATURATION: 100 % | WEIGHT: 156.06 LBS | BODY MASS INDEX: 23.65 KG/M2 | DIASTOLIC BLOOD PRESSURE: 84 MMHG | SYSTOLIC BLOOD PRESSURE: 126 MMHG | HEART RATE: 54 BPM | HEIGHT: 68 IN

## 2023-09-11 DIAGNOSIS — E03.8 SUBCLINICAL HYPOTHYROIDISM: ICD-10-CM

## 2023-09-11 DIAGNOSIS — Z79.899 OTHER LONG TERM (CURRENT) DRUG THERAPY: ICD-10-CM

## 2023-09-11 DIAGNOSIS — G43.009 MIGRAINE WITHOUT AURA AND WITHOUT STATUS MIGRAINOSUS, NOT INTRACTABLE: Chronic | ICD-10-CM

## 2023-09-11 DIAGNOSIS — R25.1 TREMORS OF NERVOUS SYSTEM: Primary | ICD-10-CM

## 2023-09-11 DIAGNOSIS — M62.838 MUSCLE SPASMS OF LOWER EXTREMITY, UNSPECIFIED LATERALITY: ICD-10-CM

## 2023-09-11 DIAGNOSIS — R00.1 BRADYCARDIA: ICD-10-CM

## 2023-09-11 DIAGNOSIS — Z86.19 H/O LYME DISEASE: ICD-10-CM

## 2023-09-11 DIAGNOSIS — L40.0 PLAQUE PSORIASIS: ICD-10-CM

## 2023-09-11 DIAGNOSIS — G50.0 TRIGEMINAL NEURALGIA: ICD-10-CM

## 2023-09-11 DIAGNOSIS — A69.23 LYME ARTHRITIS: ICD-10-CM

## 2023-09-11 PROCEDURE — 3079F DIAST BP 80-89 MM HG: CPT | Mod: CPTII,S$GLB,, | Performed by: STUDENT IN AN ORGANIZED HEALTH CARE EDUCATION/TRAINING PROGRAM

## 2023-09-11 PROCEDURE — 3079F PR MOST RECENT DIASTOLIC BLOOD PRESSURE 80-89 MM HG: ICD-10-PCS | Mod: CPTII,S$GLB,, | Performed by: STUDENT IN AN ORGANIZED HEALTH CARE EDUCATION/TRAINING PROGRAM

## 2023-09-11 PROCEDURE — 3074F PR MOST RECENT SYSTOLIC BLOOD PRESSURE < 130 MM HG: ICD-10-PCS | Mod: CPTII,S$GLB,, | Performed by: STUDENT IN AN ORGANIZED HEALTH CARE EDUCATION/TRAINING PROGRAM

## 2023-09-11 PROCEDURE — 3008F BODY MASS INDEX DOCD: CPT | Mod: CPTII,S$GLB,, | Performed by: STUDENT IN AN ORGANIZED HEALTH CARE EDUCATION/TRAINING PROGRAM

## 2023-09-11 PROCEDURE — 1159F MED LIST DOCD IN RCRD: CPT | Mod: CPTII,S$GLB,, | Performed by: STUDENT IN AN ORGANIZED HEALTH CARE EDUCATION/TRAINING PROGRAM

## 2023-09-11 PROCEDURE — 99999 PR PBB SHADOW E&M-EST. PATIENT-LVL V: ICD-10-PCS | Mod: PBBFAC,,, | Performed by: STUDENT IN AN ORGANIZED HEALTH CARE EDUCATION/TRAINING PROGRAM

## 2023-09-11 PROCEDURE — 3044F HG A1C LEVEL LT 7.0%: CPT | Mod: CPTII,S$GLB,, | Performed by: STUDENT IN AN ORGANIZED HEALTH CARE EDUCATION/TRAINING PROGRAM

## 2023-09-11 PROCEDURE — 1159F PR MEDICATION LIST DOCUMENTED IN MEDICAL RECORD: ICD-10-PCS | Mod: CPTII,S$GLB,, | Performed by: STUDENT IN AN ORGANIZED HEALTH CARE EDUCATION/TRAINING PROGRAM

## 2023-09-11 PROCEDURE — 99215 PR OFFICE/OUTPT VISIT, EST, LEVL V, 40-54 MIN: ICD-10-PCS | Mod: S$GLB,,, | Performed by: STUDENT IN AN ORGANIZED HEALTH CARE EDUCATION/TRAINING PROGRAM

## 2023-09-11 PROCEDURE — 93005 EKG 12-LEAD: ICD-10-PCS | Mod: S$GLB,,, | Performed by: STUDENT IN AN ORGANIZED HEALTH CARE EDUCATION/TRAINING PROGRAM

## 2023-09-11 PROCEDURE — 99215 OFFICE O/P EST HI 40 MIN: CPT | Mod: S$GLB,,, | Performed by: STUDENT IN AN ORGANIZED HEALTH CARE EDUCATION/TRAINING PROGRAM

## 2023-09-11 PROCEDURE — 93010 ELECTROCARDIOGRAM REPORT: CPT | Mod: S$GLB,,, | Performed by: INTERNAL MEDICINE

## 2023-09-11 PROCEDURE — 99999 PR PBB SHADOW E&M-EST. PATIENT-LVL V: CPT | Mod: PBBFAC,,, | Performed by: STUDENT IN AN ORGANIZED HEALTH CARE EDUCATION/TRAINING PROGRAM

## 2023-09-11 PROCEDURE — 3008F PR BODY MASS INDEX (BMI) DOCUMENTED: ICD-10-PCS | Mod: CPTII,S$GLB,, | Performed by: STUDENT IN AN ORGANIZED HEALTH CARE EDUCATION/TRAINING PROGRAM

## 2023-09-11 PROCEDURE — 93010 EKG 12-LEAD: ICD-10-PCS | Mod: S$GLB,,, | Performed by: INTERNAL MEDICINE

## 2023-09-11 PROCEDURE — 3074F SYST BP LT 130 MM HG: CPT | Mod: CPTII,S$GLB,, | Performed by: STUDENT IN AN ORGANIZED HEALTH CARE EDUCATION/TRAINING PROGRAM

## 2023-09-11 PROCEDURE — 3044F PR MOST RECENT HEMOGLOBIN A1C LEVEL <7.0%: ICD-10-PCS | Mod: CPTII,S$GLB,, | Performed by: STUDENT IN AN ORGANIZED HEALTH CARE EDUCATION/TRAINING PROGRAM

## 2023-09-11 PROCEDURE — 93005 ELECTROCARDIOGRAM TRACING: CPT | Mod: S$GLB,,, | Performed by: STUDENT IN AN ORGANIZED HEALTH CARE EDUCATION/TRAINING PROGRAM

## 2023-09-11 RX ORDER — PROPRANOLOL HYDROCHLORIDE 10 MG/1
10 TABLET ORAL DAILY PRN
Qty: 15 TABLET | Refills: 0 | Status: SHIPPED | OUTPATIENT
Start: 2023-09-11 | End: 2023-09-11

## 2023-09-11 NOTE — PROGRESS NOTES
Kiya Renae  2000        Subjective     Chief Complaint: Tremors    History of Present Illness:  Ms. Kiay Renae is a 23 y.o. female who presents to clinic for ED follow-up.  Reports had colonoscopy and 2 Skyrizi injections (first one broke, did not go fully in the first time due to pen issue) last week. Since then having what she feels like is a flare of Lyme.  Doxy/minocycline for a year. Now on Skyrizi for arthritis, was helping initially.    Losing feelings in her legs. Left handed tremor which is new. Muscle spasms. Saw ED for concern for seizures. Also Herpes flare on lips, 5-6 years since last outbreak. Hx of trigeminal neuralgia. [place]. Reports hx of possible grand mal seizure (with awareness per pt) and partial seizures. Gabapentin helps. Has not started Lyrica. Taking valtrex from mom.     Also having migraines. Takes nurtec samples from mom.     Reports was dx with Crohn's by GI in [place].         Review of Systems   Constitutional:  Positive for malaise/fatigue. Negative for chills and fever.   HENT:  Negative for hearing loss.    Eyes:  Negative for discharge.   Respiratory:  Negative for wheezing.    Cardiovascular:  Negative for chest pain and palpitations.   Gastrointestinal:  Negative for blood in stool, constipation, diarrhea and vomiting.   Genitourinary:  Negative for dysuria and hematuria.   Musculoskeletal:  Negative for neck pain.   Neurological:  Positive for tremors, sensory change, weakness and headaches.   Endo/Heme/Allergies:  Negative for polydipsia.   Psychiatric/Behavioral:  The patient is nervous/anxious.         PAST HISTORY:     Past Medical History:   Diagnosis Date    Anxiety 07/31/2021    Arthritis 12/30/2021    PsA    Joint pain     Lyme disease, unspecified     Psoriasis     Skin disease 12/30/2021    psorias on scalp, behind ears, and inside ear canal    Subclinical hypothyroidism 7/5/2023       Past Surgical History:    Procedure Laterality Date    COLONOSCOPY N/A 8/22/2023    Procedure: COLONOSCOPY;  Surgeon: Stanton Ferrell MD;  Location: [facility];  Service: Endoscopy;  Laterality: N/A;       Family History   Problem Relation Age of Onset    Alcohol abuse Mother     Asthma Mother     Rheum arthritis Mother     Psoriasis Mother     Alcohol abuse Father     Migraines Father     Acne Father     Depression Brother     Suicidality Brother     Eczema Brother     Diabetes Maternal Grandmother     Heart failure Maternal Grandfather     Breast cancer Paternal Grandmother     Heart attack Paternal Grandmother     Breast cancer Paternal Aunt     Lupus Paternal Aunt     Migraines Paternal Aunt        Social History     Socioeconomic History    Marital status: Single   Tobacco Use    Smoking status: Never     Passive exposure: Never    Smokeless tobacco: Never   Substance and Sexual Activity    Alcohol use: Not Currently     Comment: only socially and a very small amount if ever (one or two )    Drug use: Never    Sexual activity: Never   Other Topics Concern    Are you pregnant or think you may be? No    Breast-feeding No       MEDICATIONS & ALLERGIES:     Current Outpatient Medications on File Prior to Visit   Medication Sig    clobetasoL (TEMOVATE) 0.05 % external solution Apply topically once daily. Use to affected areas for up to 2 weeks then take a 1 week break or decrease to 3 times weekly. Do not apply to groin or face    dicyclomine (BENTYL) 20 mg tablet Take 1 tablet (20 mg total) by mouth every 8 (eight) hours as needed (abdominal cramping).    fluocinolone acetonide oiL 0.01 % Drop Place 1 application in ear(s) once daily. Use to affected areas for up to 2 weeks then take a 1 week break or decrease to 3 times weekly. For use in ears    gabapentin (NEURONTIN) 100 MG capsule Take 100 mg by mouth daily as needed.    pantoprazole (PROTONIX) 40 MG tablet Take 1 tablet (40 mg total) by mouth once daily.    pregabalin (LYRICA) 75 MG  "capsule Take 1 capsule (75 mg total) by mouth every evening.    risankizumab-rzaa (SKYRIZI) 150 mg/mL PnIj     sertraline (ZOLOFT) 100 MG tablet     sertraline (ZOLOFT) 50 MG tablet Take 50 mg by mouth.    tretinoin (RETIN-A) 0.05 % cream Apply topically every evening. Start twice weekly and increase as tolerated. Pea sized amount to entire face.     No current facility-administered medications on file prior to visit.       Review of patient's allergies indicates:   Allergen Reactions    Azithromycin Hives, Rash and Swelling       OBJECTIVE:     Vital Signs:  Vitals:    09/11/23 1018   BP: 126/84   BP Location: Left arm   Patient Position: Sitting   BP Method: Medium (Manual)   Pulse: (!) 54   SpO2: 100%   Weight: 70.8 kg (156 lb 1.4 oz)   Height: 5' 8" (1.727 m)       Body mass index is 23.73 kg/m².     Physical Exam:  Physical Exam  Vitals and nursing note reviewed.   Constitutional:       General: She is not in acute distress.     Appearance: Normal appearance. She is not ill-appearing, toxic-appearing or diaphoretic.   HENT:      Head: Normocephalic and atraumatic.   Eyes:      General: No visual field deficit or scleral icterus.        Right eye: No discharge.         Left eye: No discharge.      Conjunctiva/sclera: Conjunctivae normal.   Pulmonary:      Effort: Pulmonary effort is normal. No respiratory distress.   Musculoskeletal:      Right hand: Normal. No swelling. Normal range of motion.      Left hand: Normal. No swelling. Normal range of motion.      Cervical back: Normal range of motion.      Right lower leg: No edema.      Left lower leg: No edema.   Skin:     General: Skin is warm and dry.   Neurological:      Mental Status: She is alert and oriented to person, place, and time. Mental status is at baseline.      Cranial Nerves: No dysarthria or facial asymmetry.      Sensory: Sensation is intact.      Motor: No seizure activity or pronator drift.      Coordination: Finger-Nose-Finger Test normal.     " " Comments: Mild tremor of L hand. Not noticeable at rest.   Psychiatric:         Attention and Perception: Attention and perception normal.         Mood and Affect: Mood is anxious.         Behavior: Behavior normal.         Cognition and Memory: Cognition and memory normal.            Laboratory  Lab Results   Component Value Date    WBC 9.62 09/05/2023    HGB 14.2 09/05/2023    HCT 41.0 09/05/2023    MCV 89 09/05/2023     09/05/2023     Lab Results   Component Value Date    GLU 72 09/05/2023     09/05/2023    K 3.9 09/05/2023     09/05/2023    CO2 23 09/05/2023    BUN 17 09/05/2023    CREATININE 1.1 09/05/2023    CALCIUM 9.6 09/05/2023    MG 2.0 09/05/2023     No results found for: "INR", "PROTIME"  Lab Results   Component Value Date    HGBA1C 5.1 04/24/2023           Health Maintenance         Date Due Completion Date    COVID-19 Vaccine (1) Never done ---    HPV Vaccines (1 - 2-dose series) Never done ---    Influenza Vaccine (1) Never done ---    Pap Smear 06/23/2025 6/23/2022    TETANUS VACCINE 03/19/2033 3/19/2023                ASSESSMENT & PLAN:   Ms. Kiya Renae is a 23 y.o. female who was seen today in clinic for f/u.  Having tremors, anxiety, muscle spasms.  Unclear etiology of symptoms.  Had brain MRI in the ER without acute abnormality.  Following with Neurology already.   Also history of Lyme.  Feels her Lyme symptoms are flaring, unsure if  related to recent colonoscopy? Also following with GI, was told here colonoscopy is normal, however showed [place] GI [place] and was told Crohn's?  Neurology follow-up coming up.  We will put in referral for epilepsy for possible EEG given history of seizures back in [place].  Does not recall having EEG done.  We will also put in for an EMG.  We will also order autoimmune labs and trying get into Rheumatology with a history of psoriasis and current biologic use.     Sinus Joel on EKG.  We will avoid beta-blocker for " tremors. Echo ordered. ID referral in case related to Lyme hx ordered.      1. Tremors of nervous system  -     Ambulatory referral/consult to Neurology Epilepsy; Future; Expected date: 09/18/2023  -     TSH; Future; Expected date: 09/11/2023  -     THYROID PEROXIDASE ANTIBODY; Future; Expected date: 09/11/2023  -     EMG W/ ULTRASOUND AND NERVE CONDUCTION TEST 4 Extremities; Future  -     VITAMIN B12; Future; Expected date: 09/11/2023  -     Ambulatory referral/consult to Rheumatology; Future; Expected date: 09/18/2023      2. Muscle spasms of lower extremity, unspecified laterality  -     VITAMIN B12; Future; Expected date: 09/11/2023    3. H/o Lyme disease  -     THYROID PEROXIDASE ANTIBODY; Future; Expected date: 09/11/2023  -     EMG W/ ULTRASOUND AND NERVE CONDUCTION TEST 4 Extremities; Future  -     Ambulatory referral/consult to Infectious Disease; Future; Expected date: 09/18/2023  -     LYME DISEASE ANTIBODY BY EIA; Future; Expected date: 09/11/2023  -     VITAMIN B12; Future; Expected date: 09/11/2023  -     Ambulatory referral/consult to Rheumatology; Future; Expected date: 09/18/2023  -     IN OFFICE EKG 12-LEAD (to Muse)  -     Echo; Future    4. Trigeminal neuralgia  -     MELYSSA; Future; Expected date: 09/11/2023  -     Sedimentation rate; Future; Expected date: 09/11/2023  -     C-REACTIVE PROTEIN; Future; Expected date: 09/11/2023    5. Lyme arthritis  -     EMG W/ ULTRASOUND AND NERVE CONDUCTION TEST 4 Extremities; Future    6. Subclinical hypothyroidism  -     TSH; Future; Expected date: 09/11/2023  -     THYROID PEROXIDASE ANTIBODY; Future; Expected date: 09/11/2023  -     EMG W/ ULTRASOUND AND NERVE CONDUCTION TEST 4 Extremities; Future    7. Migraine without aura and without status migrainosus, not intractable  -     VITAMIN B12; Future; Expected date: 09/11/2023      8. Plaque psoriasis  -     Ambulatory referral/consult to Rheumatology; Future; Expected date: 09/18/2023    9. Other long term  (current) drug therapy  -     IN OFFICE EKG 12-LEAD (to Muse)  -     Echo; Future           Nicol Shell MD  Internal Medicine         Portions of this note may have been generated using voice recognition software.  Please excuse any spelling/grammatical errors. Occasional wrong-word or sound-a-like substitutions may have also occurred due to the inherent limitations of voice recognition software. Please read the chart carefully and recognize, using context, where substitutions have occurred.    Answers submitted by the patient for this visit:  Review of Systems Questionnaire (Submitted on 9/11/2023)  activity change: No  unexpected weight change: No  rhinorrhea: No  trouble swallowing: No  visual disturbance: No  chest tightness: No  polyuria: No  difficulty urinating: No  menstrual problem: No  joint swelling: No  arthralgias: No  confusion: No  dysphoric mood: No        Time spent in the evaluation and management of this patient exceeded 50min and greater than 50% of this time was in face-to-face time with the patient on day of the clinic visit.   This includes face-to-face time and non face-to-face time and includes the following:  --preparing to see the patient (eg, obtaining and/or reviewing old records such as, when applicable, primary care notes, specialist notes, hospital notes, review of laboratory tests, and/or radiographic and/or cardiology or other studies)  --performing a medically appropriate review of systems and examination and/or evaluation  --reviewing and independently interpreting results (not separately reported; eg, lab results) and communicating results to the patient and/or family/caregiver  --placing orders and/or reviewing other physician's orders which can both include medications, laboratory studies, radiographic studies, procedures, referrals etcetera and   --counseling and educating the patient and/or family member/caregiver regarding the treatment plan  --documentation of the visit  in the electronic health record  --communicating with other health care providers regarding referrals, studies, follow-up, etc

## 2023-09-11 NOTE — TELEPHONE ENCOUNTER
----- Message from Anat Neri sent at 9/11/2023 11:47 AM CDT -----  Dr. Nicol Shell has put in a referral for Consult to Neurology Epilepsy. Please assist in scheduling.    Tremors of nervous system [R25.1]    Thanks

## 2023-09-15 ENCOUNTER — HOSPITAL ENCOUNTER (OUTPATIENT)
Dept: CARDIOLOGY | Facility: HOSPITAL | Age: 23
Discharge: HOME | End: 2023-09-15
Attending: STUDENT IN AN ORGANIZED HEALTH CARE EDUCATION/TRAINING PROGRAM
Payer: COMMERCIAL

## 2023-09-15 ENCOUNTER — PATIENT MESSAGE (OUTPATIENT)
Dept: PRIMARY CARE CLINIC | Facility: CLINIC | Age: 23
End: 2023-09-15
Payer: COMMERCIAL

## 2023-09-15 VITALS — BODY MASS INDEX: 23.64 KG/M2 | HEIGHT: 68 IN | WEIGHT: 156 LBS

## 2023-09-15 DIAGNOSIS — Z79.899 OTHER LONG TERM (CURRENT) DRUG THERAPY: ICD-10-CM

## 2023-09-15 DIAGNOSIS — Z86.19 H/O LYME DISEASE: ICD-10-CM

## 2023-09-15 LAB
ASCENDING AORTA: 2.43 CM
AV INDEX (PROSTH): 0.8
AV MEAN GRADIENT: 3 MMHG
AV PEAK GRADIENT: 6 MMHG
AV VALVE AREA BY VELOCITY RATIO: 2.47 CM²
AV VALVE AREA: 2.63 CM²
AV VELOCITY RATIO: 0.76
BSA FOR ECHO PROCEDURE: 1.84 M2
CV ECHO LV RWT: 0.35 CM
DOP CALC AO PEAK VEL: 1.23 M/S
DOP CALC AO VTI: 26 CM
DOP CALC LVOT AREA: 3.3 CM2
DOP CALC LVOT DIAMETER: 2.04 CM
DOP CALC LVOT PEAK VEL: 0.93 M/S
DOP CALC LVOT STROKE VOLUME: 68.28 CM3
DOP CALC MV VTI: 23.7 CM
DOP CALCLVOT PEAK VEL VTI: 20.9 CM
E WAVE DECELERATION TIME: 175.64 MSEC
E/A RATIO: 1.58
E/E' RATIO: 5.66 M/S
ECHO LV POSTERIOR WALL: 0.85 CM (ref 0.6–1.1)
FRACTIONAL SHORTENING: 29 % (ref 28–44)
INTERVENTRICULAR SEPTUM: 0.84 CM (ref 0.6–1.1)
IVC DIAMETER: 2.14 CM
LA MAJOR: 3.84 CM
LA WIDTH: 3.5 CM
LEFT ATRIUM SIZE: 2.89 CM
LEFT ATRIUM VOLUME INDEX MOD: 16.3 ML/M2
LEFT ATRIUM VOLUME MOD: 30.06 CM3
LEFT INTERNAL DIMENSION IN SYSTOLE: 3.42 CM (ref 2.1–4)
LEFT VENTRICLE DIASTOLIC VOLUME INDEX: 59.99 ML/M2
LEFT VENTRICLE DIASTOLIC VOLUME: 110.38 ML
LEFT VENTRICLE MASS INDEX: 75 G/M2
LEFT VENTRICLE SYSTOLIC VOLUME INDEX: 26.2 ML/M2
LEFT VENTRICLE SYSTOLIC VOLUME: 48.18 ML
LEFT VENTRICULAR INTERNAL DIMENSION IN DIASTOLE: 4.85 CM (ref 3.5–6)
LEFT VENTRICULAR MASS: 138.41 G
LV LATERAL E/E' RATIO: 5.13 M/S
LV SEPTAL E/E' RATIO: 6.31 M/S
LVOT MG: 1.76 MMHG
LVOT MV: 0.61 CM/S
MV MEAN GRADIENT: 1 MMHG
MV PEAK A VEL: 0.52 M/S
MV PEAK E VEL: 0.82 M/S
MV PEAK GRADIENT: 4 MMHG
MV STENOSIS PRESSURE HALF TIME: 50.94 MS
MV VALVE AREA BY CONTINUITY EQUATION: 2.88 CM2
MV VALVE AREA P 1/2 METHOD: 4.32 CM2
OHS LV EJECTION FRACTION SIMPSONS BIPLANE MOD: 54 %
PISA TR MAX VEL: 2.08 M/S
PULM VEIN S/D RATIO: 1.13
PV MV: 0.66 M/S
PV PEAK D VEL: 0.53 M/S
PV PEAK GRADIENT: 3 MMHG
PV PEAK S VEL: 0.6 M/S
PV PEAK VELOCITY: 0.91 M/S
RA MAJOR: 4.72 CM
RA PRESSURE ESTIMATED: 3 MMHG
RA WIDTH: 3.33 CM
RIGHT VENTRICULAR END-DIASTOLIC DIMENSION: 2.35 CM
RV TB RVSP: 5 MMHG
RV TISSUE DOPPLER FREE WALL SYSTOLIC VELOCITY 1 (APICAL 4 CHAMBER VIEW): 13.82 CM/S
SINUS: 2.69 CM
STJ: 2.58 CM
TDI LATERAL: 0.16 M/S
TDI SEPTAL: 0.13 M/S
TDI: 0.15 M/S
TR MAX PG: 17 MMHG
TRICUSPID ANNULAR PLANE SYSTOLIC EXCURSION: 2.54 CM
TV REST PULMONARY ARTERY PRESSURE: 20 MMHG
Z-SCORE OF LEFT VENTRICULAR DIMENSION IN END DIASTOLE: -0.51
Z-SCORE OF LEFT VENTRICULAR DIMENSION IN END SYSTOLE: 0.65

## 2023-09-15 PROCEDURE — 93306 TTE W/DOPPLER COMPLETE: CPT

## 2023-09-15 PROCEDURE — 93306 ECHO (CUPID ONLY): ICD-10-PCS | Mod: 26,,, | Performed by: INTERNAL MEDICINE

## 2023-09-15 PROCEDURE — 93306 TTE W/DOPPLER COMPLETE: CPT | Mod: 26,,, | Performed by: INTERNAL MEDICINE

## 2023-09-18 DIAGNOSIS — Z86.19 H/O LYME DISEASE: ICD-10-CM

## 2023-09-18 DIAGNOSIS — R25.1 TREMORS OF NERVOUS SYSTEM: ICD-10-CM

## 2023-09-18 DIAGNOSIS — G43.009 MIGRAINE WITHOUT AURA AND WITHOUT STATUS MIGRAINOSUS, NOT INTRACTABLE: Chronic | ICD-10-CM

## 2023-09-18 RX ORDER — PROPRANOLOL HYDROCHLORIDE 10 MG/1
10 TABLET ORAL DAILY
Qty: 30 TABLET | Refills: 0 | Status: SHIPPED | OUTPATIENT
Start: 2023-09-18 | End: 2023-09-29 | Stop reason: SDUPTHER

## 2023-09-18 NOTE — TELEPHONE ENCOUNTER
Refill Routing Note   Medication(s) are not appropriate for processing by [facility] for the following reason(s):      New or recently adjusted medication  No active prescription written by provider    [facility] action(s):  Defer Care Due:  None identified     Medication Therapy Plan: [facility] Doctor wanted patient to hold off on Propranolol because of low heart rate; Patient commented she still wanted to try a beta blocker for her tremors  but this was d/c and no longer on med list. She stated on 9/15/23 she was doing great on Propranolol      Appointments  past 12m or future 3m with PCP    Date Provider   Last Visit   9/11/2023 Nicol Shell MD   Next Visit   Visit date not found Nicol Shell MD   ED visits in past 90 days: 1        Note composed:7:49 AM 09/18/2023

## 2023-09-18 NOTE — TELEPHONE ENCOUNTER
No care due was identified.  [facility] Embedded Care Due Messages. Reference number: [id number].   9/18/2023 7:31:19 AM CDT

## 2023-09-27 ENCOUNTER — PATIENT MESSAGE (OUTPATIENT)
Dept: PRIMARY CARE CLINIC | Facility: CLINIC | Age: 23
End: 2023-09-27
Payer: COMMERCIAL

## 2023-09-29 ENCOUNTER — HOSPITAL ENCOUNTER (EMERGENCY)
Facility: HOSPITAL | Age: 23
Discharge: HOME | End: 2023-09-29
Attending: STUDENT IN AN ORGANIZED HEALTH CARE EDUCATION/TRAINING PROGRAM
Payer: COMMERCIAL

## 2023-09-29 VITALS
BODY MASS INDEX: 22.73 KG/M2 | HEART RATE: 74 BPM | OXYGEN SATURATION: 100 % | DIASTOLIC BLOOD PRESSURE: 79 MMHG | RESPIRATION RATE: 16 BRPM | TEMPERATURE: 98 F | HEIGHT: 68 IN | SYSTOLIC BLOOD PRESSURE: 121 MMHG | WEIGHT: 150 LBS

## 2023-09-29 DIAGNOSIS — S80.812A ABRASION OF LEFT LOWER EXTREMITY, INITIAL ENCOUNTER: Primary | ICD-10-CM

## 2023-09-29 DIAGNOSIS — R25.1 TREMORS OF NERVOUS SYSTEM: ICD-10-CM

## 2023-09-29 DIAGNOSIS — Z86.19 H/O LYME DISEASE: ICD-10-CM

## 2023-09-29 DIAGNOSIS — G43.009 MIGRAINE WITHOUT AURA AND WITHOUT STATUS MIGRAINOSUS, NOT INTRACTABLE: Chronic | ICD-10-CM

## 2023-09-29 PROCEDURE — 63600175 PHARM REV CODE 636 W HCPCS: Performed by: STUDENT IN AN ORGANIZED HEALTH CARE EDUCATION/TRAINING PROGRAM

## 2023-09-29 PROCEDURE — 99284 EMERGENCY DEPT VISIT MOD MDM: CPT

## 2023-09-29 PROCEDURE — 96372 THER/PROPH/DIAG INJ SC/IM: CPT | Performed by: STUDENT IN AN ORGANIZED HEALTH CARE EDUCATION/TRAINING PROGRAM

## 2023-09-29 PROCEDURE — 25000003 PHARM REV CODE 250: Performed by: STUDENT IN AN ORGANIZED HEALTH CARE EDUCATION/TRAINING PROGRAM

## 2023-09-29 RX ORDER — BACITRACIN ZINC 500 UNIT/G
OINTMENT (GRAM) TOPICAL 2 TIMES DAILY
Qty: 30 G | Refills: 0 | Status: SHIPPED | OUTPATIENT
Start: 2023-09-29

## 2023-09-29 RX ORDER — KETOROLAC TROMETHAMINE 30 MG/ML
30 INJECTION, SOLUTION INTRAMUSCULAR; INTRAVENOUS
Status: COMPLETED | OUTPATIENT
Start: 2023-09-29 | End: 2023-09-29

## 2023-09-29 RX ORDER — CEPHALEXIN 500 MG/1
500 CAPSULE ORAL EVERY 6 HOURS
Qty: 20 CAPSULE | Refills: 0 | Status: SHIPPED | OUTPATIENT
Start: 2023-09-29 | End: 2023-10-04

## 2023-09-29 RX ORDER — METHOCARBAMOL 500 MG/1
1000 TABLET, FILM COATED ORAL 3 TIMES DAILY
Qty: 30 TABLET | Refills: 0 | Status: SHIPPED | OUTPATIENT
Start: 2023-09-29 | End: 2023-10-04

## 2023-09-29 RX ORDER — IBUPROFEN 600 MG/1
600 TABLET ORAL EVERY 6 HOURS PRN
Qty: 20 TABLET | Refills: 0 | Status: SHIPPED | OUTPATIENT
Start: 2023-09-29 | End: 2023-12-13

## 2023-09-29 RX ORDER — ACETAMINOPHEN 500 MG
500 TABLET ORAL EVERY 6 HOURS PRN
Qty: 20 TABLET | Refills: 0 | Status: SHIPPED | OUTPATIENT
Start: 2023-09-29

## 2023-09-29 RX ADMIN — KETOROLAC TROMETHAMINE 30 MG: 30 INJECTION, SOLUTION INTRAMUSCULAR at 05:09

## 2023-09-29 RX ADMIN — BACITRACIN ZINC, NEOMYCIN, POLYMYXIN B 1 EACH: 400; 3.5; 5 OINTMENT TOPICAL at 05:09

## 2023-09-29 NOTE — DISCHARGE INSTRUCTIONS
Thank you for coming to our Emergency Department today. It is important to remember that some problems are difficult to diagnose and may not be found during your first visit. Be sure to follow up with your primary care doctor and review any labs/imaging that was performed with them. If you do not have a primary care doctor, you may contact the one listed on your discharge paperwork or you may also call the [facility] Appointment Desk at [phone] to schedule an appointment with one.     All medications may potentially have side effects and it is impossible to predict which medications may give you side effects. If you feel that you are having a negative effect of any medication you should immediately stop taking them and seek medical attention.    Return to the ER with any questions/concerns, new/concerning symptoms, worsening or failure to improve. Do not drive or make any important decisions for 24 hours if you have received any pain medications, sedatives or mood altering drugs during your ER visit.

## 2023-09-29 NOTE — ED NOTES
Sterile saline gauze placed on wounds at 0505 to soak for 10 min, then wounds debrided with surgical hibiclens scrub brush and irrigated with sterile saline to prevent infection.  Pt was able to tolerate.  Wounds covered with vaseline gauze to prevent sticking, secured with soft kimberly.  All home wound care inst given to pt , she states understanding.

## 2023-09-29 NOTE — ED PROVIDER NOTES
Encounter Date: 9/29/2023       History     Chief Complaint   Patient presents with    Leg Injury     States she was playing softball and slid yesterday.  Complaining of sores and scrapes to left leg.     23 y.o. female who  has a past medical history of Anxiety, Arthritis, Joint pain, Lyme disease, unspecified, Psoriasis, Skin disease, and Subclinical hypothyroidism presents to emergency department with abrasions to her left lower extremity playing softball yesterday.  Patient states she was sliding when she sustained her injury.  She did not think much of it at time after arriving home she is been having worsening pain.  She reports taking a shower and wash much as possible.  Denies any fevers or chills.  She reports her tetanus is up-to-date.  She denies any head injury or loss of consciousness.    The history is provided by the patient.     Review of patient's allergies indicates:   Allergen Reactions    Azithromycin Hives, Rash and Swelling     Past Medical History:   Diagnosis Date    Anxiety 07/31/2021    Arthritis 12/30/2021    PsA    Joint pain     Lyme disease, unspecified     Psoriasis     Skin disease 12/30/2021    psorias on scalp, behind ears, and inside ear canal    Subclinical hypothyroidism 7/5/2023     Past Surgical History:   Procedure Laterality Date    COLONOSCOPY N/A 8/22/2023    Procedure: COLONOSCOPY;  Surgeon: Stanton Ferrell MD;  Location: [facility];  Service: Endoscopy;  Laterality: N/A;     Family History   Problem Relation Age of Onset    Alcohol abuse Mother     Asthma Mother     Rheum arthritis Mother     Psoriasis Mother     Alcohol abuse Father     Migraines Father     Acne Father     Depression Brother     Suicidality Brother     Eczema Brother     Diabetes Maternal Grandmother     Heart failure Maternal Grandfather     Breast cancer Paternal Grandmother     Heart attack Paternal Grandmother     Breast cancer Paternal Aunt     Lupus Paternal Aunt     Migraines Paternal Aunt       Social History     Tobacco Use    Smoking status: Never     Passive exposure: Never    Smokeless tobacco: Never   Substance Use Topics    Alcohol use: Not Currently     Comment: only socially and a very small amount if ever (one or two )    Drug use: Never     Review of Systems   Constitutional:  Negative for chills and fever.   Eyes:  Negative for pain.   Respiratory:  Negative for shortness of breath.    Cardiovascular:  Negative for chest pain.   Gastrointestinal:  Negative for abdominal pain, nausea and vomiting.   Genitourinary:  Negative for dysuria and hematuria.   Musculoskeletal:  Negative for gait problem and neck pain.   Skin:  Positive for rash and wound.   Neurological:  Negative for weakness and headaches.       Physical Exam     Initial Vitals [09/29/23 0435]   BP Pulse Resp Temp SpO2   122/75 77 18 98 °F (36.7 °C) 98 %      MAP       --         Physical Exam    Nursing note and vitals reviewed.  Constitutional: She is not diaphoretic. No distress.   HENT:   Head: Normocephalic and atraumatic.   Eyes: Conjunctivae and EOM are normal. Pupils are equal, round, and reactive to light.   Neck:   Normal range of motion.  Cardiovascular:  Regular rhythm.           Pulmonary/Chest: Breath sounds normal. No respiratory distress.   Abdominal: Abdomen is soft. Bowel sounds are normal. She exhibits no distension. There is no abdominal tenderness. There is no rebound and no guarding.   Musculoskeletal:         General: No tenderness. Normal range of motion.      Cervical back: Normal range of motion.        Legs:      Lymphadenopathy:     She has no cervical adenopathy.   Neurological: She is alert.   Skin: Skin is warm. Capillary refill takes less than 2 seconds.   Psychiatric: She has a normal mood and affect. Her behavior is normal.         ED Course   Procedures  Labs Reviewed - No data to display       Imaging Results    None          Medications   ketorolac injection 30 mg (30 mg Intramuscular Given  9/29/23 0501)   neomycin-bacitracnZn-polymyxnB packet 1 each (1 each Topical (Top) Given 9/29/23 0501)     Medical Decision Making:   History:   Old Medical Records: I decided to obtain old medical records.  Initial Assessment:   23-year-old female presents to the emergency department with left lower extremity abrasion sustained yesterday.  Patient in no acute distress vitals within normal limits.  Exam notable for superficial abrasion to the left lateral upper thigh, and lateral calf.  Neurovascularly intact distally.  No significant surrounding erythema or crepitus.  Patient's tetanus up-to-date.  Will clean out wound and apply bacitracin ointment.  Given injury occurred yesterday and occurred on dirt will prophylactically treat with antibiotics (Keflex).  Will give IM Toradol for pain control.  Prescription for bacitracin, Keflex, Tylenol, ibuprofen, Robaxin given.  Strict return precautions and anticipatory guidance discussed.  Differential Diagnosis:   Differential Diagnosis includes, but is not limited to:  Cellulitis, abscess, necrotizing fasciitis, local trauma/contusion         Pt is currently stable for discharge. I see no indication of an emergent process beyond that addressed during our encounter but have duly counseled the patient/family regarding the need for prompt follow-up as well as the indications that should prompt immediate return to the emergency room should new or worrisome developments occur. I discussed the ED work up and diagnostic findings with the patient/family. The patient/family has been provided with verbal and printed direction regarding our final diagnosis(es) as well as instructions regarding use of OTC and/or Rx medications intended to manage the patient's aforementioned conditions. The patient/family verbalized an understanding. The patient/family is asked if there are any questions or concerns. We discuss the case, until all issues are addressed to the patient/family's  satisfaction. Patient/family understands and is agreeable to the plan.                 Medical Decision Making  Risk  OTC drugs.  Prescription drug management.           Clinical Impression:   Final diagnoses:  [S83.447O] Abrasion of left lower extremity, initial encounter (Primary)          ED Disposition Condition    Discharge Stable          ED Prescriptions       Medication Sig Dispense Start Date End Date Auth. Provider    bacitracin 500 unit/gram Oint Apply topically 2 (two) times daily. 30 g 9/29/2023 -- Ramy Suarez MD    cephALEXin (KEFLEX) 500 MG capsule Take 1 capsule (500 mg total) by mouth every 6 (six) hours. for 5 days 20 capsule 9/29/2023 10/4/2023 Ramy Suarez MD    ibuprofen (ADVIL,MOTRIN) 600 MG tablet Take 1 tablet (600 mg total) by mouth every 6 (six) hours as needed for Pain. 20 tablet 9/29/2023 -- Ramy Suarez MD    acetaminophen (TYLENOL) 500 MG tablet Take 1 tablet (500 mg total) by mouth every 6 (six) hours as needed for Pain. 20 tablet 9/29/2023 -- Ramy Suarez MD    methocarbamoL (ROBAXIN) 500 MG Tab Take 2 tablets (1,000 mg total) by mouth 3 (three) times daily. for 5 days 30 tablet 9/29/2023 10/4/2023 Ramy Suarez MD          Follow-up Information       Follow up With Specialties Details Why Contact Info    Nicol Shell MD Internal Medicine Schedule an appointment as soon as possible for a visit  for reassesment, For wound re-check [address] [facility] [address]  [phone]      [facility] Emergency Dept Emergency Medicine  If symptoms worsen [address] [facility] [address] [phone]            DISCLAIMER: This note was prepared with [employer] voice recognition transcription software. Garbled syntax, mangled pronouns, and other bizarre constructions may be attributed to that software system.     Ramy Suarez MD  09/29/23 [id number]

## 2023-09-30 NOTE — TELEPHONE ENCOUNTER
No care due was identified.  [facility] Embedded Care Due Messages. Reference number: [id number].   9/29/2023 10:58:41 PM CDT

## 2023-10-04 RX ORDER — PROPRANOLOL HYDROCHLORIDE 10 MG/1
10 TABLET ORAL DAILY
Qty: 30 TABLET | Refills: 0 | Status: SHIPPED | OUTPATIENT
Start: 2023-10-04 | End: 2023-11-10 | Stop reason: SDUPTHER

## 2023-10-18 ENCOUNTER — OFFICE VISIT (OUTPATIENT)
Dept: NEUROLOGY | Facility: CLINIC | Age: 23
End: 2023-10-18
Payer: COMMERCIAL

## 2023-10-18 VITALS
DIASTOLIC BLOOD PRESSURE: 78 MMHG | HEART RATE: 69 BPM | BODY MASS INDEX: 23.46 KG/M2 | WEIGHT: 154.31 LBS | SYSTOLIC BLOOD PRESSURE: 129 MMHG

## 2023-10-18 DIAGNOSIS — F34.89 OTHER SPECIFIED PERSISTENT MOOD DISORDERS: ICD-10-CM

## 2023-10-18 DIAGNOSIS — G43.009 MIGRAINE WITHOUT AURA AND WITHOUT STATUS MIGRAINOSUS, NOT INTRACTABLE: Primary | ICD-10-CM

## 2023-10-18 DIAGNOSIS — R25.1 TREMOR: ICD-10-CM

## 2023-10-18 DIAGNOSIS — G50.9 TRIGEMINAL NEUROPATHY: ICD-10-CM

## 2023-10-18 PROCEDURE — 1160F PR REVIEW ALL MEDS BY PRESCRIBER/CLIN PHARMACIST DOCUMENTED: ICD-10-PCS | Mod: CPTII,S$GLB,, | Performed by: PSYCHIATRY & NEUROLOGY

## 2023-10-18 PROCEDURE — 99214 PR OFFICE/OUTPT VISIT, EST, LEVL IV, 30-39 MIN: ICD-10-PCS | Mod: S$GLB,,, | Performed by: PSYCHIATRY & NEUROLOGY

## 2023-10-18 PROCEDURE — 99214 OFFICE O/P EST MOD 30 MIN: CPT | Mod: S$GLB,,, | Performed by: PSYCHIATRY & NEUROLOGY

## 2023-10-18 PROCEDURE — 1160F RVW MEDS BY RX/DR IN RCRD: CPT | Mod: CPTII,S$GLB,, | Performed by: PSYCHIATRY & NEUROLOGY

## 2023-10-18 PROCEDURE — 99999 PR PBB SHADOW E&M-EST. PATIENT-LVL III: ICD-10-PCS | Mod: PBBFAC,,, | Performed by: PSYCHIATRY & NEUROLOGY

## 2023-10-18 PROCEDURE — 3078F PR MOST RECENT DIASTOLIC BLOOD PRESSURE < 80 MM HG: ICD-10-PCS | Mod: CPTII,S$GLB,, | Performed by: PSYCHIATRY & NEUROLOGY

## 2023-10-18 PROCEDURE — 3074F SYST BP LT 130 MM HG: CPT | Mod: CPTII,S$GLB,, | Performed by: PSYCHIATRY & NEUROLOGY

## 2023-10-18 PROCEDURE — 3078F DIAST BP <80 MM HG: CPT | Mod: CPTII,S$GLB,, | Performed by: PSYCHIATRY & NEUROLOGY

## 2023-10-18 PROCEDURE — 1159F MED LIST DOCD IN RCRD: CPT | Mod: CPTII,S$GLB,, | Performed by: PSYCHIATRY & NEUROLOGY

## 2023-10-18 PROCEDURE — 1159F PR MEDICATION LIST DOCUMENTED IN MEDICAL RECORD: ICD-10-PCS | Mod: CPTII,S$GLB,, | Performed by: PSYCHIATRY & NEUROLOGY

## 2023-10-18 PROCEDURE — 3044F HG A1C LEVEL LT 7.0%: CPT | Mod: CPTII,S$GLB,, | Performed by: PSYCHIATRY & NEUROLOGY

## 2023-10-18 PROCEDURE — 3008F PR BODY MASS INDEX (BMI) DOCUMENTED: ICD-10-PCS | Mod: CPTII,S$GLB,, | Performed by: PSYCHIATRY & NEUROLOGY

## 2023-10-18 PROCEDURE — 3074F PR MOST RECENT SYSTOLIC BLOOD PRESSURE < 130 MM HG: ICD-10-PCS | Mod: CPTII,S$GLB,, | Performed by: PSYCHIATRY & NEUROLOGY

## 2023-10-18 PROCEDURE — 3008F BODY MASS INDEX DOCD: CPT | Mod: CPTII,S$GLB,, | Performed by: PSYCHIATRY & NEUROLOGY

## 2023-10-18 PROCEDURE — 3044F PR MOST RECENT HEMOGLOBIN A1C LEVEL <7.0%: ICD-10-PCS | Mod: CPTII,S$GLB,, | Performed by: PSYCHIATRY & NEUROLOGY

## 2023-10-18 PROCEDURE — 99999 PR PBB SHADOW E&M-EST. PATIENT-LVL III: CPT | Mod: PBBFAC,,, | Performed by: PSYCHIATRY & NEUROLOGY
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
## 2023-10-18 NOTE — PATIENT INSTRUCTIONS
Discontinue gabapentin if going to try Lyrica  Start Lyrica 75 mg nightly. Message me as a I can increase dose if needed  Referral for movement disorders clinic  Continue to follow with talk therapist for mood

## 2023-11-10 ENCOUNTER — OFFICE VISIT (OUTPATIENT)
Dept: PRIMARY CARE CLINIC | Facility: CLINIC | Age: 23
End: 2023-11-10
Payer: COMMERCIAL

## 2023-11-10 VITALS
SYSTOLIC BLOOD PRESSURE: 124 MMHG | DIASTOLIC BLOOD PRESSURE: 86 MMHG | OXYGEN SATURATION: 100 % | HEIGHT: 68 IN | WEIGHT: 156.31 LBS | BODY MASS INDEX: 23.69 KG/M2 | HEART RATE: 62 BPM

## 2023-11-10 DIAGNOSIS — L40.50 PSORIATIC ARTHRITIS: ICD-10-CM

## 2023-11-10 DIAGNOSIS — Z83.2 FAMILY HISTORY OF AUTOIMMUNE DISORDER: ICD-10-CM

## 2023-11-10 DIAGNOSIS — A69.23 LYME ARTHRITIS: ICD-10-CM

## 2023-11-10 DIAGNOSIS — R25.1 TREMORS OF NERVOUS SYSTEM: Primary | ICD-10-CM

## 2023-11-10 DIAGNOSIS — M62.838 MUSCLE SPASM: ICD-10-CM

## 2023-11-10 DIAGNOSIS — R29.90 NEUROLOGICAL DYSFUNCTION: ICD-10-CM

## 2023-11-10 DIAGNOSIS — Z86.19 H/O LYME DISEASE: ICD-10-CM

## 2023-11-10 DIAGNOSIS — G43.009 MIGRAINE WITHOUT AURA AND WITHOUT STATUS MIGRAINOSUS, NOT INTRACTABLE: Chronic | ICD-10-CM

## 2023-11-10 DIAGNOSIS — E03.8 SUBCLINICAL HYPOTHYROIDISM: ICD-10-CM

## 2023-11-10 DIAGNOSIS — G50.0 TRIGEMINAL NEURALGIA: ICD-10-CM

## 2023-11-10 PROCEDURE — 3044F PR MOST RECENT HEMOGLOBIN A1C LEVEL <7.0%: ICD-10-PCS | Mod: CPTII,S$GLB,, | Performed by: STUDENT IN AN ORGANIZED HEALTH CARE EDUCATION/TRAINING PROGRAM

## 2023-11-10 PROCEDURE — 3079F DIAST BP 80-89 MM HG: CPT | Mod: CPTII,S$GLB,, | Performed by: STUDENT IN AN ORGANIZED HEALTH CARE EDUCATION/TRAINING PROGRAM

## 2023-11-10 PROCEDURE — 3079F PR MOST RECENT DIASTOLIC BLOOD PRESSURE 80-89 MM HG: ICD-10-PCS | Mod: CPTII,S$GLB,, | Performed by: STUDENT IN AN ORGANIZED HEALTH CARE EDUCATION/TRAINING PROGRAM

## 2023-11-10 PROCEDURE — 99214 OFFICE O/P EST MOD 30 MIN: CPT | Mod: S$GLB,,, | Performed by: STUDENT IN AN ORGANIZED HEALTH CARE EDUCATION/TRAINING PROGRAM

## 2023-11-10 PROCEDURE — 1159F PR MEDICATION LIST DOCUMENTED IN MEDICAL RECORD: ICD-10-PCS | Mod: CPTII,S$GLB,, | Performed by: STUDENT IN AN ORGANIZED HEALTH CARE EDUCATION/TRAINING PROGRAM

## 2023-11-10 PROCEDURE — 3074F PR MOST RECENT SYSTOLIC BLOOD PRESSURE < 130 MM HG: ICD-10-PCS | Mod: CPTII,S$GLB,, | Performed by: STUDENT IN AN ORGANIZED HEALTH CARE EDUCATION/TRAINING PROGRAM

## 2023-11-10 PROCEDURE — 1159F MED LIST DOCD IN RCRD: CPT | Mod: CPTII,S$GLB,, | Performed by: STUDENT IN AN ORGANIZED HEALTH CARE EDUCATION/TRAINING PROGRAM

## 2023-11-10 PROCEDURE — 3044F HG A1C LEVEL LT 7.0%: CPT | Mod: CPTII,S$GLB,, | Performed by: STUDENT IN AN ORGANIZED HEALTH CARE EDUCATION/TRAINING PROGRAM

## 2023-11-10 PROCEDURE — 3008F BODY MASS INDEX DOCD: CPT | Mod: CPTII,S$GLB,, | Performed by: STUDENT IN AN ORGANIZED HEALTH CARE EDUCATION/TRAINING PROGRAM

## 2023-11-10 PROCEDURE — 99999 PR PBB SHADOW E&M-EST. PATIENT-LVL V: CPT | Mod: PBBFAC,,, | Performed by: STUDENT IN AN ORGANIZED HEALTH CARE EDUCATION/TRAINING PROGRAM

## 2023-11-10 PROCEDURE — 3008F PR BODY MASS INDEX (BMI) DOCUMENTED: ICD-10-PCS | Mod: CPTII,S$GLB,, | Performed by: STUDENT IN AN ORGANIZED HEALTH CARE EDUCATION/TRAINING PROGRAM

## 2023-11-10 PROCEDURE — 1160F RVW MEDS BY RX/DR IN RCRD: CPT | Mod: CPTII,S$GLB,, | Performed by: STUDENT IN AN ORGANIZED HEALTH CARE EDUCATION/TRAINING PROGRAM

## 2023-11-10 PROCEDURE — 1160F PR REVIEW ALL MEDS BY PRESCRIBER/CLIN PHARMACIST DOCUMENTED: ICD-10-PCS | Mod: CPTII,S$GLB,, | Performed by: STUDENT IN AN ORGANIZED HEALTH CARE EDUCATION/TRAINING PROGRAM

## 2023-11-10 PROCEDURE — 3074F SYST BP LT 130 MM HG: CPT | Mod: CPTII,S$GLB,, | Performed by: STUDENT IN AN ORGANIZED HEALTH CARE EDUCATION/TRAINING PROGRAM

## 2023-11-10 PROCEDURE — 99999 PR PBB SHADOW E&M-EST. PATIENT-LVL V: ICD-10-PCS | Mod: PBBFAC,,, | Performed by: STUDENT IN AN ORGANIZED HEALTH CARE EDUCATION/TRAINING PROGRAM

## 2023-11-10 PROCEDURE — 99214 PR OFFICE/OUTPT VISIT, EST, LEVL IV, 30-39 MIN: ICD-10-PCS | Mod: S$GLB,,, | Performed by: STUDENT IN AN ORGANIZED HEALTH CARE EDUCATION/TRAINING PROGRAM

## 2023-11-10 RX ORDER — PROPRANOLOL HYDROCHLORIDE 10 MG/1
10 TABLET ORAL DAILY
Qty: 90 TABLET | Refills: 0 | Status: SHIPPED | OUTPATIENT
Start: 2023-11-10 | End: 2023-11-10 | Stop reason: SDUPTHER

## 2023-11-10 RX ORDER — TOPIRAMATE 25 MG/1
25 TABLET ORAL DAILY
Qty: 30 TABLET | Refills: 0 | Status: SHIPPED | OUTPATIENT
Start: 2023-11-10 | End: 2023-11-10 | Stop reason: SDUPTHER

## 2023-11-10 RX ORDER — METHOCARBAMOL 500 MG/1
500 TABLET, FILM COATED ORAL 2 TIMES DAILY PRN
Qty: 30 TABLET | Refills: 0 | Status: SHIPPED | OUTPATIENT
Start: 2023-11-10 | End: 2023-11-10 | Stop reason: SDUPTHER

## 2023-11-10 NOTE — PROGRESS NOTES
Kiya Renae  2000        Subjective     Chief Complaint: F/u    History of Present Illness:  Ms. Kiya Renae is a 23 y.o. female who presents to clinic for tremors f/u.    Tremors- sees Dr. Banuelos in Neurology. Hx of trigeminal neuralgia and Lyme disease.   Propranolol helps. HR tolerating.   Gabapentin rarely.     Hx of Crohn's/Psoriatic asrthrits- sees GI back in [place].    Review of Systems   Constitutional:  Negative for chills and fever.   HENT:  Negative for hearing loss.    Eyes:  Negative for discharge.   Respiratory:  Negative for wheezing.    Cardiovascular:  Negative for chest pain and palpitations.   Gastrointestinal:  Negative for blood in stool, constipation, diarrhea and vomiting.   Genitourinary:  Negative for dysuria and hematuria.   Musculoskeletal:  Negative for neck pain.   Neurological:  Positive for sensory change, weakness and headaches.   Endo/Heme/Allergies:  Negative for polydipsia.        PAST HISTORY:     Past Medical History:   Diagnosis Date    Anxiety 07/31/2021    Arthritis 12/30/2021    PsA    Joint pain     Lyme disease, unspecified     Psoriasis     Skin disease 12/30/2021    psorias on scalp, behind ears, and inside ear canal    Subclinical hypothyroidism 7/5/2023       Past Surgical History:   Procedure Laterality Date    COLONOSCOPY N/A 8/22/2023    Procedure: COLONOSCOPY;  Surgeon: Stanton Ferrell MD;  Location: [facility];  Service: Endoscopy;  Laterality: N/A;       Family History   Problem Relation Age of Onset    Alcohol abuse Mother     Asthma Mother     Rheum arthritis Mother     Psoriasis Mother     Alcohol abuse Father     Migraines Father     Acne Father     Depression Brother     Suicidality Brother     Eczema Brother     Diabetes Maternal Grandmother     Heart failure Maternal Grandfather     Breast cancer Paternal Grandmother     Heart attack Paternal Grandmother     Breast cancer Paternal Aunt     Lupus Paternal Aunt     Migraines Paternal Aunt         Social History     Socioeconomic History    Marital status: Single   Tobacco Use    Smoking status: Never     Passive exposure: Never    Smokeless tobacco: Never   Substance and Sexual Activity    Alcohol use: Not Currently     Comment: only socially and a very small amount if ever (one or two )    Drug use: Never    Sexual activity: Never   Other Topics Concern    Are you pregnant or think you may be? No    Breast-feeding No     Social Determinants of Health     Financial Resource Strain: Low Risk  (11/10/2023)    Overall Financial Resource Strain (CARDIA)     Difficulty of Paying Living Expenses: Not very hard   Food Insecurity: No Food Insecurity (11/10/2023)    Hunger Vital Sign     Worried About Running Out of Food in the Last Year: Never true     Ran Out of Food in the Last Year: Never true   Transportation Needs: No Transportation Needs (11/10/2023)    PRAPARE - Transportation     Lack of Transportation (Medical): No     Lack of Transportation (Non-Medical): No   Physical Activity: Sufficiently Active (11/10/2023)    Exercise Vital Sign     Days of Exercise per Week: 6 days     Minutes of Exercise per Session: 60 min   Stress: Stress Concern Present (11/10/2023)    [employer] [place] [employer] - Occupational Stress Questionnaire     Feeling of Stress : To some extent   Social Connections: Unknown (11/10/2023)    Social Connection and Isolation Panel [NHANES]     Frequency of Communication with Friends and Family: More than three times a week     Frequency of Social Gatherings with Friends and Family: More than three times a week     Active Member of Clubs or Organizations: Yes     Attends Club or Organization Meetings: More than 4 times per year     Marital Status: Never    Housing Stability: Unknown (11/10/2023)    Housing Stability Vital Sign     Unable to Pay for Housing in the Last Year: No     Unstable Housing in the Last Year: No       MEDICATIONS & ALLERGIES:     Current  Outpatient Medications on File Prior to Visit   Medication Sig    acetaminophen (TYLENOL) 500 MG tablet Take 1 tablet (500 mg total) by mouth every 6 (six) hours as needed for Pain.    bacitracin 500 unit/gram Oint Apply topically 2 (two) times daily.    clobetasoL (TEMOVATE) 0.05 % external solution Apply topically once daily. Use to affected areas for up to 2 weeks then take a 1 week break or decrease to 3 times weekly. Do not apply to groin or face    dicyclomine (BENTYL) 20 mg tablet Take 1 tablet (20 mg total) by mouth every 8 (eight) hours as needed (abdominal cramping).    fluocinolone acetonide oiL 0.01 % Drop Place 1 application in ear(s) once daily. Use to affected areas for up to 2 weeks then take a 1 week break or decrease to 3 times weekly. For use in ears    gabapentin (NEURONTIN) 100 MG capsule Take 100 mg by mouth daily as needed.    ibuprofen (ADVIL,MOTRIN) 600 MG tablet Take 1 tablet (600 mg total) by mouth every 6 (six) hours as needed for Pain.    pantoprazole (PROTONIX) 40 MG tablet Take 1 tablet (40 mg total) by mouth once daily.    risankizumab-rzaa (SKYRIZI) 150 mg/mL PnIj     sertraline (ZOLOFT) 100 MG tablet     sertraline (ZOLOFT) 50 MG tablet Take 50 mg by mouth.    tretinoin (RETIN-A) 0.05 % cream Apply topically every evening. Start twice weekly and increase as tolerated. Pea sized amount to entire face.    [DISCONTINUED] pregabalin (LYRICA) 75 MG capsule Take 1 capsule (75 mg total) by mouth every evening.    [DISCONTINUED] propranoloL (INDERAL) 10 MG tablet Take 1 tablet (10 mg total) by mouth once daily. Stop use if HR drops below 60 or you feel dizzy/light-headed/weak     No current facility-administered medications on file prior to visit.       Review of patient's allergies indicates:   Allergen Reactions    Azithromycin Hives, Rash and Swelling       OBJECTIVE:     Vital Signs:  Vitals:    11/10/23 1342   BP: 124/86   BP Location: Left arm   Patient Position: Sitting   BP Method:  "Medium (Manual)   Pulse: 62   SpO2: 100%   Weight: 70.9 kg (156 lb 4.9 oz)   Height: 5' 8" (1.727 m)       Body mass index is 23.77 kg/m².     Physical Exam:  Physical Exam  Vitals and nursing note reviewed.   Constitutional:       General: She is not in acute distress.     Appearance: Normal appearance. She is not ill-appearing, toxic-appearing or diaphoretic.   HENT:      Head: Normocephalic and atraumatic.   Eyes:      General: No scleral icterus.     Conjunctiva/sclera: Conjunctivae normal.   Pulmonary:      Effort: Pulmonary effort is normal. No respiratory distress.   Neurological:      Mental Status: She is alert and oriented to person, place, and time. Mental status is at baseline.   Psychiatric:         Mood and Affect: Mood normal.         Behavior: Behavior normal.            Laboratory  Lab Results   Component Value Date    WBC 9.62 09/05/2023    HGB 14.2 09/05/2023    HCT 41.0 09/05/2023    MCV 89 09/05/2023     09/05/2023     Lab Results   Component Value Date    GLU 72 09/05/2023     09/05/2023    K 3.9 09/05/2023     09/05/2023    CO2 23 09/05/2023    BUN 17 09/05/2023    CREATININE 1.1 09/05/2023    CALCIUM 9.6 09/05/2023    MG 2.0 09/05/2023     No results found for: "INR", "PROTIME"  Lab Results   Component Value Date    HGBA1C 5.1 04/24/2023           Health Maintenance         Date Due Completion Date    COVID-19 Vaccine (1) Never done ---    HPV Vaccines (1 - 2-dose series) Never done ---    Influenza Vaccine (1) Never done ---    Pap Smear 06/23/2025 6/23/2022    TETANUS VACCINE 03/19/2033 3/19/2023            ASSESSMENT & PLAN:   Ms. Kiya Renae is a 23 y.o. female who was seen today in clinic for f/u. Unclear etiology. Consider MS vs non focal seizures? Seeing Neuro but will help get in to neuro specialists. Gaurav may benefit from movement team for her tremors. Will try topiramate to help with migraines, may had added benefit of helping with tremors.      1. Tremors " of nervous system  -     Ambulatory referral/consult to Neurology; Future; Expected date: 11/17/2023  -     propranoloL (INDERAL) 10 MG tablet; Take 1 tablet (10 mg total) by mouth once daily. Stop use if HR drops below 60 or you feel dizzy/light-headed/weak  Dispense: 90 tablet; Refill: 0  -     MRI Brain Demyelinating W W/O Contrast; Future; Expected date: 11/10/2023  -     Ambulatory referral/consult to Neurology; Future; Expected date: 11/17/2023  -     Ambulatory referral/consult to Neurology; Future; Expected date: 11/17/2023  -     topiramate (TOPAMAX) 25 MG tablet; Take 1 tablet (25 mg total) by mouth once daily.  Dispense: 30 tablet; Refill: 0  -     methocarbamoL (ROBAXIN) 500 MG Tab; Take 1 tablet (500 mg total) by mouth 2 (two) times daily as needed (muscle spasms). This medication can cause sedation. Try on a day you do not have to work the next day. Do not mix with alcohol or any other sedating meds (such as sleep aids, opioids, or benzos). Caution with driving or operating heavy machinery.  Dispense: 30 tablet; Refill: 0    2. Muscle spasm  -     methocarbamoL (ROBAXIN) 500 MG Tab; Take 1 tablet (500 mg total) by mouth 2 (two) times daily as needed (muscle spasms). This medication can cause sedation. Try on a day you do not have to work the next day. Do not mix with alcohol or any other sedating meds (such as sleep aids, opioids, or benzos). Caution with driving or operating heavy machinery.  Dispense: 30 tablet; Refill: 0    3. Neurological dysfunction  -     MRI Brain Demyelinating W W/O Contrast; Future; Expected date: 11/10/2023  -     Ambulatory referral/consult to Neurology; Future; Expected date: 11/17/2023  -     Ambulatory referral/consult to Neurology; Future; Expected date: 11/17/2023  -     topiramate (TOPAMAX) 25 MG tablet; Take 1 tablet (25 mg total) by mouth once daily.  Dispense: 30 tablet; Refill: 0    4. Family history of autoimmune disorder    5. Migraine without aura and without  status migrainosus, not intractable  -     MRI Brain Demyelinating W W/O Contrast; Future; Expected date: 11/10/2023  -     Ambulatory referral/consult to Neurology; Future; Expected date: 11/17/2023  -     topiramate (TOPAMAX) 25 MG tablet; Take 1 tablet (25 mg total) by mouth once daily.  Dispense: 30 tablet; Refill: 0  -     BASIC METABOLIC PANEL; Future; Expected date: 11/10/2023    6. H/o Lyme disease  -     propranoloL (INDERAL) 10 MG tablet; Take 1 tablet (10 mg total) by mouth once daily. Stop use if HR drops below 60 or you feel dizzy/light-headed/weak  Dispense: 90 tablet; Refill: 0  -     MRI Brain Demyelinating W W/O Contrast; Future; Expected date: 11/10/2023    7. Subclinical hypothyroidism  -     TSH; Future; Expected date: 11/10/2023    8. Lyme arthritis  -     methocarbamoL (ROBAXIN) 500 MG Tab; Take 1 tablet (500 mg total) by mouth 2 (two) times daily as needed (muscle spasms). This medication can cause sedation. Try on a day you do not have to work the next day. Do not mix with alcohol or any other sedating meds (such as sleep aids, opioids, or benzos). Caution with driving or operating heavy machinery.  Dispense: 30 tablet; Refill: 0    9. Trigeminal neuralgia  -     methocarbamoL (ROBAXIN) 500 MG Tab; Take 1 tablet (500 mg total) by mouth 2 (two) times daily as needed (muscle spasms). This medication can cause sedation. Try on a day you do not have to work the next day. Do not mix with alcohol or any other sedating meds (such as sleep aids, opioids, or benzos). Caution with driving or operating heavy machinery.  Dispense: 30 tablet; Refill: 0    10. Psoriatic arthritis           Nicol Shell MD  Internal Medicine         Portions of this note may have been generated using voice recognition software.  Please excuse any spelling/grammatical errors. Occasional wrong-word or sound-a-like substitutions may have also occurred due to the inherent limitations of voice recognition software. Please read  the chart carefully and recognize, using context, where substitutions have occurred.    Answers submitted by the patient for this visit:  Review of Systems Questionnaire (Submitted on 11/10/2023)  activity change: No  unexpected weight change: No  rhinorrhea: No  trouble swallowing: No  visual disturbance: No  chest tightness: No  polyuria: No  difficulty urinating: No  menstrual problem: No  joint swelling: Yes  arthralgias: Yes  confusion: No  dysphoric mood: No

## 2023-11-10 NOTE — LETTER
November 10, 2023      [facility]  [address]  [facility] [address]  Phone: [phone]  Fax: [phone]       Patient: Kiya Renae   YOB: 2000  Date of Visit: 11/10/2023    To Whom It May Concern:    Mayra Renae  was at [facility] on 11/10/2023. Please allow Ms. Renae to retain her feline as she is aiding with her anxiety and mental health. If you have any questions or concerns, or if I can be of further assistance, please do not hesitate to contact me.    Sincerely,    Nicol Shell MD

## 2023-11-12 ENCOUNTER — PATIENT MESSAGE (OUTPATIENT)
Dept: PRIMARY CARE CLINIC | Facility: CLINIC | Age: 23
End: 2023-11-12
Payer: COMMERCIAL

## 2023-11-15 ENCOUNTER — HOSPITAL ENCOUNTER (OUTPATIENT)
Dept: RADIOLOGY | Facility: HOSPITAL | Age: 23
Discharge: HOME | End: 2023-11-15
Attending: STUDENT IN AN ORGANIZED HEALTH CARE EDUCATION/TRAINING PROGRAM
Payer: COMMERCIAL

## 2023-11-15 DIAGNOSIS — G43.009 MIGRAINE WITHOUT AURA AND WITHOUT STATUS MIGRAINOSUS, NOT INTRACTABLE: Chronic | ICD-10-CM

## 2023-11-15 DIAGNOSIS — Z86.19 H/O LYME DISEASE: ICD-10-CM

## 2023-11-15 DIAGNOSIS — R29.90 NEUROLOGICAL DYSFUNCTION: ICD-10-CM

## 2023-11-15 DIAGNOSIS — R25.1 TREMORS OF NERVOUS SYSTEM: ICD-10-CM

## 2023-11-15 PROCEDURE — 70553 MRI BRAIN DEMYELINATING W/ WO CONTRAST: ICD-10-PCS | Mod: 26,,, | Performed by: RADIOLOGY

## 2023-11-15 PROCEDURE — 70553 MRI BRAIN STEM W/O & W/DYE: CPT | Mod: TC

## 2023-11-15 PROCEDURE — 70553 MRI BRAIN STEM W/O & W/DYE: CPT | Mod: 26,,, | Performed by: RADIOLOGY

## 2023-11-15 PROCEDURE — 25500020 PHARM REV CODE 255: Performed by: STUDENT IN AN ORGANIZED HEALTH CARE EDUCATION/TRAINING PROGRAM

## 2023-11-15 PROCEDURE — A9585 GADOBUTROL INJECTION: HCPCS | Performed by: STUDENT IN AN ORGANIZED HEALTH CARE EDUCATION/TRAINING PROGRAM

## 2023-11-15 RX ORDER — TOPIRAMATE 25 MG/1
25 TABLET ORAL DAILY
Qty: 30 TABLET | Refills: 0 | Status: SHIPPED | OUTPATIENT
Start: 2023-11-15

## 2023-11-15 RX ORDER — METHOCARBAMOL 500 MG/1
500 TABLET, FILM COATED ORAL 2 TIMES DAILY PRN
Qty: 30 TABLET | Refills: 0 | Status: SHIPPED | OUTPATIENT
Start: 2023-11-15 | End: 2024-01-24

## 2023-11-15 RX ORDER — PROPRANOLOL HYDROCHLORIDE 10 MG/1
10 TABLET ORAL DAILY
Qty: 90 TABLET | Refills: 0 | Status: SHIPPED | OUTPATIENT
Start: 2023-11-15 | End: 2024-01-17

## 2023-11-15 RX ORDER — GADOBUTROL 604.72 MG/ML
7.5 INJECTION INTRAVENOUS
Status: COMPLETED | OUTPATIENT
Start: 2023-11-15 | End: 2023-11-15

## 2023-11-15 RX ADMIN — GADOBUTROL 7.5 ML: 604.72 INJECTION INTRAVENOUS at 03:11

## 2023-11-24 ENCOUNTER — PATIENT MESSAGE (OUTPATIENT)
Dept: PRIMARY CARE CLINIC | Facility: CLINIC | Age: 23
End: 2023-11-24
Payer: COMMERCIAL

## 2023-11-29 ENCOUNTER — PROCEDURE VISIT (OUTPATIENT)
Dept: NEUROLOGY | Facility: CLINIC | Age: 23
End: 2023-11-29
Payer: COMMERCIAL

## 2023-11-29 DIAGNOSIS — R25.1 TREMORS OF NERVOUS SYSTEM: Primary | ICD-10-CM

## 2023-11-29 DIAGNOSIS — Z86.19 H/O LYME DISEASE: ICD-10-CM

## 2023-11-29 DIAGNOSIS — E03.8 SUBCLINICAL HYPOTHYROIDISM: ICD-10-CM

## 2023-11-29 DIAGNOSIS — M79.605 PAIN OF LEFT LOWER EXTREMITY: ICD-10-CM

## 2023-11-29 DIAGNOSIS — A69.23 LYME ARTHRITIS: ICD-10-CM

## 2023-11-29 PROCEDURE — 95886 PR EMG COMPLETE, W/ NERVE CONDUCTION STUDIES, 5+ MUSCLES: ICD-10-PCS | Mod: S$GLB,,, | Performed by: PSYCHIATRY & NEUROLOGY

## 2023-11-29 PROCEDURE — 95912 NRV CNDJ TEST 11-12 STUDIES: CPT | Mod: S$GLB,,, | Performed by: PSYCHIATRY & NEUROLOGY

## 2023-11-29 PROCEDURE — 99213 PR OFFICE/OUTPT VISIT, EST, LEVL III, 20-29 MIN: ICD-10-PCS | Mod: S$GLB,,, | Performed by: PSYCHIATRY & NEUROLOGY

## 2023-11-29 PROCEDURE — 95886 MUSC TEST DONE W/N TEST COMP: CPT | Mod: S$GLB,,, | Performed by: PSYCHIATRY & NEUROLOGY

## 2023-11-29 PROCEDURE — 95912 PR NERVE CONDUCTION STUDY; 11 -12 STUDIES: ICD-10-PCS | Mod: S$GLB,,, | Performed by: PSYCHIATRY & NEUROLOGY

## 2023-11-29 PROCEDURE — 99213 OFFICE O/P EST LOW 20 MIN: CPT | Mod: S$GLB,,, | Performed by: PSYCHIATRY & NEUROLOGY

## 2023-11-29 NOTE — PROCEDURES
"EMG W/ ULTRASOUND AND NERVE CONDUCTION TEST 4 Extremities    Date/Time: 11/29/2023 11:00 AM    Performed by: Brandon Bonilla MD  Authorized by: Nicol Shell MD                                                                 [facility] [address]     Subjective:       Patient ID: Kiya Renae is a 23 y.o. female here for a EMG focused evaluation for left arm and hand tremors as well as left leg discomfort. Previous visits and diagnostic evaluation reviewed.  Patient has a history of Lyme disease and right Bell's palsy while living in [place].  She describes progressive symptomatology including arthritis and is being worked up for Chron's disease.  She also describes persistent left hand tremors.  She has provided me video of 1 of her tremors.  She also describes left thigh discomfort.  She uses the term "wood" to describe the sensations.  Symptoms have affected her gait.  Symptoms have been progressively worsening and severe at times.   HPI  Review of patient's allergies indicates:   Allergen Reactions    Azithromycin Hives, Rash and Swelling      There were no vitals filed for this visit.   Chief Complaint: No chief complaint on file.    Past Medical History:   Diagnosis Date    Anxiety 07/31/2021    Arthritis 12/30/2021    PsA    Joint pain     Lyme disease, unspecified     Psoriasis     Skin disease 12/30/2021    psorias on scalp, behind ears, and inside ear canal    Subclinical hypothyroidism 7/5/2023      Social History     Socioeconomic History    Marital status: Single   Tobacco Use    Smoking status: Never     Passive exposure: Never    Smokeless tobacco: Never   Substance and Sexual Activity    Alcohol use: Not Currently     Comment: only socially and a very small amount if ever (one or two )    Drug use: Never    Sexual activity: Never   Other Topics Concern    Are you pregnant or think you may be? No    Breast-feeding No     Social Determinants of Health     Financial Resource Strain: Low " Risk  (11/10/2023)    Overall Financial Resource Strain (CARDIA)     Difficulty of Paying Living Expenses: Not very hard   Food Insecurity: No Food Insecurity (11/10/2023)    Hunger Vital Sign     Worried About Running Out of Food in the Last Year: Never true     Ran Out of Food in the Last Year: Never true   Transportation Needs: No Transportation Needs (11/10/2023)    PRAPARE - Transportation     Lack of Transportation (Medical): No     Lack of Transportation (Non-Medical): No   Physical Activity: Sufficiently Active (11/10/2023)    Exercise Vital Sign     Days of Exercise per Week: 6 days     Minutes of Exercise per Session: 60 min   Stress: Stress Concern Present (11/10/2023)    [employer] [place] [employer] - Occupational Stress Questionnaire     Feeling of Stress : To some extent   Social Connections: Unknown (11/10/2023)    Social Connection and Isolation Panel [NHANES]     Frequency of Communication with Friends and Family: More than three times a week     Frequency of Social Gatherings with Friends and Family: More than three times a week     Active Member of Clubs or Organizations: Yes     Attends Club or Organization Meetings: More than 4 times per year     Marital Status: Never    Housing Stability: Unknown (11/10/2023)    Housing Stability Vital Sign     Unable to Pay for Housing in the Last Year: No     Unstable Housing in the Last Year: No        Objective:      Physical Exam    Constitutional: Patient appears well-nourished.   Head: Normocephalic and atraumatic.   Mouth/Throat: Oropharynx is clear and moist.   Pulmonary/Chest: Effort normal.   Abdominal: Soft.   Skin: Skin is warm and dry.      General:  Patient is alert and cooperative.  Affect:  Patient is appropriate to surroundings and environment.  Language:  Speech is fluent.  HEENT:  There are no outward signs of trauma to head or face.  Cranial Nerves:  Pupils are equal round and reactive to light. Extra-ocular movements are  intact. Face, tongue, and palate are symmetrical.  Motor:  Patient exhibits normal strength testing in bilateral proximal and distal upper and lower extremities.  Reflexes:  Symmetrical in bilateral upper and lower extremities.  Gait:  Ambulation is independent without use of cane or walker without signs of ataxia or circumduction.  Cerebellar:  Normal finger to nose testing without dysmetria.  Sensory:  Intact to sensory modalities tested.  Musculoskeletal:  There is no severe tenderness to palpation and manipulation of cervical and lumbar spine regions.   Assessment:       We reviewed and discussed at length results of EMG of the left upper and lower extremities performed today which were normal. These findings are available via media section of chart review.   1. Tremors of nervous system    2. Subclinical hypothyroidism    3. H/o Lyme disease    4. Lyme arthritis    5. Pain of left lower extremity              Plan:       We discussed treatment options at length. Recommend patient keep appointment with referring provider.  She has also been referred to a movement disorder specialist.        I spent a total of 35 minutes on the day of the visit. This includes face to face time and non-face to face time preparing to see the patient (eg, review of tests), obtaining and/or reviewing separately obtained history, documenting clinical information in the electronic or other health record, independently interpreting results and communicating results to the patient/family/caregiver, or care coordinator.    Brandon Bonilla MD, FAAN  11/29/2023   12:13 PM       A dictation device was used to produce this document. Use of such devices sometimes results in grammatical errors or replacement of words that sound similarly.

## 2023-12-03 DIAGNOSIS — L70.9 ACNE, UNSPECIFIED ACNE TYPE: ICD-10-CM

## 2023-12-04 RX ORDER — TRETINOIN 0.5 MG/G
CREAM TOPICAL NIGHTLY
Qty: 20 G | Refills: 3 | Status: SHIPPED | OUTPATIENT
Start: 2023-12-04 | End: 2024-03-06 | Stop reason: SDUPTHER

## 2023-12-13 ENCOUNTER — OFFICE VISIT (OUTPATIENT)
Dept: PRIMARY CARE CLINIC | Facility: CLINIC | Age: 23
End: 2023-12-13
Payer: COMMERCIAL

## 2023-12-13 ENCOUNTER — LAB VISIT (OUTPATIENT)
Dept: LAB | Facility: HOSPITAL | Age: 23
End: 2023-12-13
Attending: STUDENT IN AN ORGANIZED HEALTH CARE EDUCATION/TRAINING PROGRAM
Payer: COMMERCIAL

## 2023-12-13 VITALS
WEIGHT: 157.19 LBS | SYSTOLIC BLOOD PRESSURE: 102 MMHG | HEIGHT: 68 IN | HEART RATE: 73 BPM | DIASTOLIC BLOOD PRESSURE: 80 MMHG | OXYGEN SATURATION: 100 % | BODY MASS INDEX: 23.82 KG/M2

## 2023-12-13 DIAGNOSIS — R29.90 NEUROLOGICAL DYSFUNCTION: Primary | ICD-10-CM

## 2023-12-13 DIAGNOSIS — R25.1 TREMORS OF NERVOUS SYSTEM: ICD-10-CM

## 2023-12-13 DIAGNOSIS — M62.838 MUSCLE SPASMS OF LOWER EXTREMITY, UNSPECIFIED LATERALITY: ICD-10-CM

## 2023-12-13 DIAGNOSIS — L40.0 PLAQUE PSORIASIS: ICD-10-CM

## 2023-12-13 DIAGNOSIS — Z86.19 H/O LYME DISEASE: ICD-10-CM

## 2023-12-13 DIAGNOSIS — G12.20 MOTOR NEURON DISEASE, UNSPECIFIED: ICD-10-CM

## 2023-12-13 DIAGNOSIS — G43.009 MIGRAINE WITHOUT AURA AND WITHOUT STATUS MIGRAINOSUS, NOT INTRACTABLE: ICD-10-CM

## 2023-12-13 DIAGNOSIS — A69.23 LYME ARTHRITIS: ICD-10-CM

## 2023-12-13 DIAGNOSIS — R29.90 NEUROLOGICAL DYSFUNCTION: ICD-10-CM

## 2023-12-13 DIAGNOSIS — Z83.2 FAMILY HISTORY OF AUTOIMMUNE DISORDER: ICD-10-CM

## 2023-12-13 LAB
ANION GAP SERPL CALC-SCNC: 8 MMOL/L (ref 8–16)
BUN SERPL-MCNC: 16 MG/DL (ref 6–20)
CALCIUM SERPL-MCNC: 9.2 MG/DL (ref 8.7–10.5)
CCP AB SER IA-ACNC: 0.7 U/ML
CHLORIDE SERPL-SCNC: 109 MMOL/L (ref 95–110)
CO2 SERPL-SCNC: 25 MMOL/L (ref 23–29)
CREAT SERPL-MCNC: 1.1 MG/DL (ref 0.5–1.4)
EST. GFR  (NO RACE VARIABLE): >60 ML/MIN/1.73 M^2
GLUCOSE SERPL-MCNC: 57 MG/DL (ref 70–110)
POTASSIUM SERPL-SCNC: 4 MMOL/L (ref 3.5–5.1)
RHEUMATOID FACT SERPL-ACNC: <13 IU/ML (ref 0–15)
SODIUM SERPL-SCNC: 142 MMOL/L (ref 136–145)
TSH SERPL DL<=0.005 MIU/L-ACNC: 2.44 UIU/ML (ref 0.4–4)

## 2023-12-13 PROCEDURE — 83516 IMMUNOASSAY NONANTIBODY: CPT | Mod: 59 | Performed by: STUDENT IN AN ORGANIZED HEALTH CARE EDUCATION/TRAINING PROGRAM

## 2023-12-13 PROCEDURE — 1160F RVW MEDS BY RX/DR IN RCRD: CPT | Mod: CPTII,S$GLB,, | Performed by: STUDENT IN AN ORGANIZED HEALTH CARE EDUCATION/TRAINING PROGRAM

## 2023-12-13 PROCEDURE — 99215 OFFICE O/P EST HI 40 MIN: CPT | Mod: S$GLB,,, | Performed by: STUDENT IN AN ORGANIZED HEALTH CARE EDUCATION/TRAINING PROGRAM

## 2023-12-13 PROCEDURE — 86235 NUCLEAR ANTIGEN ANTIBODY: CPT | Performed by: STUDENT IN AN ORGANIZED HEALTH CARE EDUCATION/TRAINING PROGRAM

## 2023-12-13 PROCEDURE — 99215 PR OFFICE/OUTPT VISIT, EST, LEVL V, 40-54 MIN: ICD-10-PCS | Mod: S$GLB,,, | Performed by: STUDENT IN AN ORGANIZED HEALTH CARE EDUCATION/TRAINING PROGRAM

## 2023-12-13 PROCEDURE — 82300 ASSAY OF CADMIUM: CPT | Performed by: STUDENT IN AN ORGANIZED HEALTH CARE EDUCATION/TRAINING PROGRAM

## 2023-12-13 PROCEDURE — 86235 NUCLEAR ANTIGEN ANTIBODY: CPT | Mod: 59 | Performed by: STUDENT IN AN ORGANIZED HEALTH CARE EDUCATION/TRAINING PROGRAM

## 2023-12-13 PROCEDURE — 83516 IMMUNOASSAY NONANTIBODY: CPT | Performed by: STUDENT IN AN ORGANIZED HEALTH CARE EDUCATION/TRAINING PROGRAM

## 2023-12-13 PROCEDURE — 3044F HG A1C LEVEL LT 7.0%: CPT | Mod: CPTII,S$GLB,, | Performed by: STUDENT IN AN ORGANIZED HEALTH CARE EDUCATION/TRAINING PROGRAM

## 2023-12-13 PROCEDURE — 80048 BASIC METABOLIC PNL TOTAL CA: CPT | Performed by: STUDENT IN AN ORGANIZED HEALTH CARE EDUCATION/TRAINING PROGRAM

## 2023-12-13 PROCEDURE — 99999 PR PBB SHADOW E&M-EST. PATIENT-LVL V: CPT | Mod: PBBFAC,,, | Performed by: STUDENT IN AN ORGANIZED HEALTH CARE EDUCATION/TRAINING PROGRAM

## 2023-12-13 PROCEDURE — 3044F PR MOST RECENT HEMOGLOBIN A1C LEVEL <7.0%: ICD-10-PCS | Mod: CPTII,S$GLB,, | Performed by: STUDENT IN AN ORGANIZED HEALTH CARE EDUCATION/TRAINING PROGRAM

## 2023-12-13 PROCEDURE — 1159F MED LIST DOCD IN RCRD: CPT | Mod: CPTII,S$GLB,, | Performed by: STUDENT IN AN ORGANIZED HEALTH CARE EDUCATION/TRAINING PROGRAM

## 2023-12-13 PROCEDURE — 86225 DNA ANTIBODY NATIVE: CPT | Performed by: STUDENT IN AN ORGANIZED HEALTH CARE EDUCATION/TRAINING PROGRAM

## 2023-12-13 PROCEDURE — 86200 CCP ANTIBODY: CPT | Performed by: STUDENT IN AN ORGANIZED HEALTH CARE EDUCATION/TRAINING PROGRAM

## 2023-12-13 PROCEDURE — 3079F PR MOST RECENT DIASTOLIC BLOOD PRESSURE 80-89 MM HG: ICD-10-PCS | Mod: CPTII,S$GLB,, | Performed by: STUDENT IN AN ORGANIZED HEALTH CARE EDUCATION/TRAINING PROGRAM

## 2023-12-13 PROCEDURE — 83520 IMMUNOASSAY QUANT NOS NONAB: CPT | Performed by: STUDENT IN AN ORGANIZED HEALTH CARE EDUCATION/TRAINING PROGRAM

## 2023-12-13 PROCEDURE — 1159F PR MEDICATION LIST DOCUMENTED IN MEDICAL RECORD: ICD-10-PCS | Mod: CPTII,S$GLB,, | Performed by: STUDENT IN AN ORGANIZED HEALTH CARE EDUCATION/TRAINING PROGRAM

## 2023-12-13 PROCEDURE — 3079F DIAST BP 80-89 MM HG: CPT | Mod: CPTII,S$GLB,, | Performed by: STUDENT IN AN ORGANIZED HEALTH CARE EDUCATION/TRAINING PROGRAM

## 2023-12-13 PROCEDURE — 36415 COLL VENOUS BLD VENIPUNCTURE: CPT | Mod: PN | Performed by: STUDENT IN AN ORGANIZED HEALTH CARE EDUCATION/TRAINING PROGRAM

## 2023-12-13 PROCEDURE — 86431 RHEUMATOID FACTOR QUANT: CPT | Performed by: STUDENT IN AN ORGANIZED HEALTH CARE EDUCATION/TRAINING PROGRAM

## 2023-12-13 PROCEDURE — 99999 PR PBB SHADOW E&M-EST. PATIENT-LVL V: ICD-10-PCS | Mod: PBBFAC,,, | Performed by: STUDENT IN AN ORGANIZED HEALTH CARE EDUCATION/TRAINING PROGRAM

## 2023-12-13 PROCEDURE — 86038 ANTINUCLEAR ANTIBODIES: CPT | Performed by: STUDENT IN AN ORGANIZED HEALTH CARE EDUCATION/TRAINING PROGRAM

## 2023-12-13 PROCEDURE — 84443 ASSAY THYROID STIM HORMONE: CPT | Performed by: STUDENT IN AN ORGANIZED HEALTH CARE EDUCATION/TRAINING PROGRAM

## 2023-12-13 PROCEDURE — 3008F BODY MASS INDEX DOCD: CPT | Mod: CPTII,S$GLB,, | Performed by: STUDENT IN AN ORGANIZED HEALTH CARE EDUCATION/TRAINING PROGRAM

## 2023-12-13 PROCEDURE — 3074F SYST BP LT 130 MM HG: CPT | Mod: CPTII,S$GLB,, | Performed by: STUDENT IN AN ORGANIZED HEALTH CARE EDUCATION/TRAINING PROGRAM

## 2023-12-13 PROCEDURE — 3074F PR MOST RECENT SYSTOLIC BLOOD PRESSURE < 130 MM HG: ICD-10-PCS | Mod: CPTII,S$GLB,, | Performed by: STUDENT IN AN ORGANIZED HEALTH CARE EDUCATION/TRAINING PROGRAM

## 2023-12-13 PROCEDURE — 3008F PR BODY MASS INDEX (BMI) DOCUMENTED: ICD-10-PCS | Mod: CPTII,S$GLB,, | Performed by: STUDENT IN AN ORGANIZED HEALTH CARE EDUCATION/TRAINING PROGRAM

## 2023-12-13 PROCEDURE — 1160F PR REVIEW ALL MEDS BY PRESCRIBER/CLIN PHARMACIST DOCUMENTED: ICD-10-PCS | Mod: CPTII,S$GLB,, | Performed by: STUDENT IN AN ORGANIZED HEALTH CARE EDUCATION/TRAINING PROGRAM

## 2023-12-13 NOTE — PROGRESS NOTES
Kiya Renae  2000        Subjective     Chief Complaint: F/u     History of Present Illness:  Ms. Kiya Renae is a 23 y.o. female who presents to clinic for f/u tremors.     MRI brain done 8/22/23 without acute abnormality.   MRI brain demyelinating also without white matter lesions but pt would like 2nd opinion.  EMG done without acute abnormality.     Has appt with Dr. Alfaro in February.   Attempting to get MS specialist appt.   Planning to try and see Neuro in  [facility] this holiday as well.    Topiramate started for migraines and tremors, helped but made her tired. Propranolol seems to be helping the most with tremors. Has been using 10 mg daily.     Zoloft helping as well, taking at night. On 200 mg.     Tried muscle relaxers for spasms but not really helping.    Still on Skyrizi for Psoriasis/Crohn's.  Follows with GI in [place] as well. Seems to be doing well with this new dose.     Rheumatology- was supposed to see Dr. Carmen.   Has new appt in 2/2024. Dr. Coombs.      Review of Systems   Constitutional:  Negative for chills and fever.   HENT:  Negative for hearing loss.    Eyes:  Negative for discharge.   Respiratory:  Negative for wheezing.    Cardiovascular:  Negative for chest pain and palpitations.   Gastrointestinal:  Negative for blood in stool, constipation, diarrhea and vomiting.   Genitourinary:  Negative for dysuria and hematuria.   Musculoskeletal:  Negative for neck pain.   Neurological:  Positive for tremors. Negative for weakness and headaches.   Endo/Heme/Allergies:  Negative for polydipsia.        PAST HISTORY:     Past Medical History:   Diagnosis Date    Anxiety 07/31/2021    Arthritis 12/30/2021    PsA    Joint pain     Lyme disease, unspecified     Psoriasis     Skin disease 12/30/2021    psorias on scalp, behind ears, and inside ear canal    Subclinical hypothyroidism 7/5/2023       Past Surgical History:   Procedure Laterality Date    COLONOSCOPY N/A 8/22/2023     Procedure: COLONOSCOPY;  Surgeon: Stanton Ferrell MD;  Location: [facility];  Service: Endoscopy;  Laterality: N/A;       Family History   Problem Relation Age of Onset    Alcohol abuse Mother     Asthma Mother     Rheum arthritis Mother     Psoriasis Mother     Alcohol abuse Father     Migraines Father     Acne Father     Depression Brother     Suicidality Brother     Eczema Brother     Diabetes Maternal Grandmother     Heart failure Maternal Grandfather     Breast cancer Paternal Grandmother     Heart attack Paternal Grandmother     Breast cancer Paternal Aunt     Lupus Paternal Aunt     Migraines Paternal Aunt        Social History     Socioeconomic History    Marital status: Single   Tobacco Use    Smoking status: Never     Passive exposure: Never    Smokeless tobacco: Never   Substance and Sexual Activity    Alcohol use: Not Currently     Comment: only socially and a very small amount if ever (one or two )    Drug use: Never    Sexual activity: Never   Other Topics Concern    Are you pregnant or think you may be? No    Breast-feeding No     Social Determinants of Health     Financial Resource Strain: Low Risk  (11/10/2023)    Overall Financial Resource Strain (CARDIA)     Difficulty of Paying Living Expenses: Not very hard   Food Insecurity: No Food Insecurity (11/10/2023)    Hunger Vital Sign     Worried About Running Out of Food in the Last Year: Never true     Ran Out of Food in the Last Year: Never true   Transportation Needs: No Transportation Needs (11/10/2023)    PRAPARE - Transportation     Lack of Transportation (Medical): No     Lack of Transportation (Non-Medical): No   Physical Activity: Sufficiently Active (11/10/2023)    Exercise Vital Sign     Days of Exercise per Week: 6 days     Minutes of Exercise per Session: 60 min   Stress: Stress Concern Present (11/10/2023)    Citizen of [place] of Occupational [employer] - Occupational Stress Questionnaire     Feeling of Stress : To some extent   Social  Connections: Unknown (11/10/2023)    Social Connection and Isolation Panel [NHANES]     Frequency of Communication with Friends and Family: More than three times a week     Frequency of Social Gatherings with Friends and Family: More than three times a week     Active Member of Clubs or Organizations: Yes     Attends Club or Organization Meetings: More than 4 times per year     Marital Status: Never    Housing Stability: Unknown (11/10/2023)    Housing Stability Vital Sign     Unable to Pay for Housing in the Last Year: No     Unstable Housing in the Last Year: No       MEDICATIONS & ALLERGIES:     Current Outpatient Medications on File Prior to Visit   Medication Sig    acetaminophen (TYLENOL) 500 MG tablet Take 1 tablet (500 mg total) by mouth every 6 (six) hours as needed for Pain.    bacitracin 500 unit/gram Oint Apply topically 2 (two) times daily.    clobetasoL (TEMOVATE) 0.05 % external solution Apply topically once daily. Use to affected areas for up to 2 weeks then take a 1 week break or decrease to 3 times weekly. Do not apply to groin or face    dicyclomine (BENTYL) 20 mg tablet Take 1 tablet (20 mg total) by mouth every 8 (eight) hours as needed (abdominal cramping).    fluocinolone acetonide oiL 0.01 % Drop Place 1 application in ear(s) once daily. Use to affected areas for up to 2 weeks then take a 1 week break or decrease to 3 times weekly. For use in ears    gabapentin (NEURONTIN) 100 MG capsule Take 100 mg by mouth daily as needed.    methocarbamoL (ROBAXIN) 500 MG Tab Take 1 tablet (500 mg total) by mouth 2 (two) times daily as needed (muscle spasms). This medication can cause sedation. Try on a day you do not have to work the next day. Do not mix with alcohol or any other sedating meds (such as sleep aids, opioids, or benzos). Caution with driving or operating heavy machinery.    pantoprazole (PROTONIX) 40 MG tablet Take 1 tablet (40 mg total) by mouth once daily.    propranoloL (INDERAL)  "10 MG tablet Take 1 tablet (10 mg total) by mouth once daily. Stop use if HR drops below 60 or you feel dizzy/light-headed/weak    risankizumab-rzaa (SKYRIZI) 150 mg/mL PnIj     sertraline (ZOLOFT) 100 MG tablet     sertraline (ZOLOFT) 50 MG tablet Take 50 mg by mouth.    topiramate (TOPAMAX) 25 MG tablet Take 1 tablet (25 mg total) by mouth once daily.    tretinoin (RETIN-A) 0.05 % cream Apply topically every evening. Start twice weekly and increase as tolerated. Pea sized amount to entire face.    [DISCONTINUED] ibuprofen (ADVIL,MOTRIN) 600 MG tablet Take 1 tablet (600 mg total) by mouth every 6 (six) hours as needed for Pain.     No current facility-administered medications on file prior to visit.       Review of patient's allergies indicates:   Allergen Reactions    Azithromycin Hives, Rash and Swelling       OBJECTIVE:     Vital Signs:  Vitals:    12/13/23 0842   BP: 102/80   BP Location: Right arm   Patient Position: Sitting   BP Method: Medium (Manual)   Pulse: 73   SpO2: 100%   Weight: 71.3 kg (157 lb 3 oz)   Height: 5' 8" (1.727 m)       Body mass index is 23.9 kg/m².     Physical Exam:  Physical Exam  Vitals and nursing note reviewed.   Constitutional:       General: She is not in acute distress.     Appearance: Normal appearance. She is not ill-appearing, toxic-appearing or diaphoretic.   HENT:      Head: Normocephalic and atraumatic.   Eyes:      General: No scleral icterus.     Conjunctiva/sclera: Conjunctivae normal.   Pulmonary:      Effort: Pulmonary effort is normal. No respiratory distress.   Neurological:      Mental Status: She is alert and oriented to person, place, and time. Mental status is at baseline.   Psychiatric:         Mood and Affect: Mood normal.         Behavior: Behavior normal.            Laboratory  Lab Results   Component Value Date    WBC 9.62 09/05/2023    HGB 14.2 09/05/2023    HCT 41.0 09/05/2023    MCV 89 09/05/2023     09/05/2023     Lab Results   Component Value " "Date    GLU 72 09/05/2023     09/05/2023    K 3.9 09/05/2023     09/05/2023    CO2 23 09/05/2023    BUN 17 09/05/2023    CREATININE 1.1 09/05/2023    CALCIUM 9.6 09/05/2023    MG 2.0 09/05/2023     No results found for: "INR", "PROTIME"  Lab Results   Component Value Date    HGBA1C 5.1 04/24/2023           Health Maintenance         Date Due Completion Date    COVID-19 Vaccine (1) Never done ---    HPV Vaccines (1 - 2-dose series) Never done ---    Influenza Vaccine (1) Never done ---    Pap Smear 06/23/2025 6/23/2022    TETANUS VACCINE 03/19/2033 3/19/2023                ASSESSMENT & PLAN:   Ms. Kiya Renae is a 23 y.o. female who was seen today in clinic for f/u. Unclear etiology, MRI cervical spine, MRI lumbar spine. CT chest. Full autoimmune panel ordered.   Has specialists scheduled, can get 2nd opinion as well.       1. Neurological dysfunction  -     Ambulatory referral/consult to Neurology; Future; Expected date: 12/20/2023  -     Ambulatory referral/consult to Neurology; Future; Expected date: 12/20/2023  -     Anti-scleroderma antibody; Future; Expected date: 12/13/2023  -     RNA polymerase III Ab, IgG; Future; Expected date: 12/13/2023  -     PM-Scl Antibody by Immunodiffusion; Future; Expected date: 12/13/2023  -     Anti Sm/RNP Antibody; Future; Expected date: 12/13/2023  -     Th/To Antibody; Future; Expected date: 12/13/2023  -     MyoMarker Panel 3; Future; Expected date: 12/13/2023  -     ANTI-DNA ANTIBODY, DOUBLE-STRANDED; Future; Expected date: 12/13/2023  -     ANTI-HISTONE ANTIBODY; Future; Expected date: 12/13/2023  -     MELYSSA; Future; Expected date: 12/13/2023  -     HEAVY METALS SCREEN, BLOOD (QUANTITATIVE); Future; Expected date: 12/13/2023  -     Rheumatoid factor; Future; Expected date: 12/13/2023  -     Cyclic citrul peptide antibody, IgG; Future; Expected date: 12/13/2023  -     ANTI -SSA ANTIBODY; Future; Expected date: 12/13/2023  -     ANTI-SSB ANTIBODY; Future; " Expected date: 12/13/2023  -     TSH; Future; Expected date: 12/13/2023  -     BASIC METABOLIC PANEL; Future; Expected date: 12/13/2023    2. Tremors of nervous system  -     Ambulatory referral/consult to Neurology; Future; Expected date: 12/20/2023  -     Ambulatory referral/consult to Neurology; Future; Expected date: 12/20/2023  -     Anti-scleroderma antibody; Future; Expected date: 12/13/2023  -     RNA polymerase III Ab, IgG; Future; Expected date: 12/13/2023  -     PM-Scl Antibody by Immunodiffusion; Future; Expected date: 12/13/2023  -     Anti Sm/RNP Antibody; Future; Expected date: 12/13/2023  -     Th/To Antibody; Future; Expected date: 12/13/2023  -     MyoMarker Panel 3; Future; Expected date: 12/13/2023  -     ANTI-DNA ANTIBODY, DOUBLE-STRANDED; Future; Expected date: 12/13/2023  -     ANTI-HISTONE ANTIBODY; Future; Expected date: 12/13/2023  -     MELYSSA; Future; Expected date: 12/13/2023  -     HEAVY METALS SCREEN, BLOOD (QUANTITATIVE); Future; Expected date: 12/13/2023  -     Rheumatoid factor; Future; Expected date: 12/13/2023  -     Cyclic citrul peptide antibody, IgG; Future; Expected date: 12/13/2023  -     ANTI -SSA ANTIBODY; Future; Expected date: 12/13/2023  -     ANTI-SSB ANTIBODY; Future; Expected date: 12/13/2023  -     TSH; Future; Expected date: 12/13/2023  -     BASIC METABOLIC PANEL; Future; Expected date: 12/13/2023    3. Family history of autoimmune disorder  -     Ambulatory referral/consult to Neurology; Future; Expected date: 12/20/2023  -     Anti-scleroderma antibody; Future; Expected date: 12/13/2023  -     RNA polymerase III Ab, IgG; Future; Expected date: 12/13/2023  -     PM-Scl Antibody by Immunodiffusion; Future; Expected date: 12/13/2023  -     Anti Sm/RNP Antibody; Future; Expected date: 12/13/2023  -     Th/To Antibody; Future; Expected date: 12/13/2023  -     MyoMarker Panel 3; Future; Expected date: 12/13/2023  -     ANTI-DNA ANTIBODY, DOUBLE-STRANDED; Future; Expected  date: 12/13/2023  -     ANTI-HISTONE ANTIBODY; Future; Expected date: 12/13/2023  -     MELYSSA; Future; Expected date: 12/13/2023  -     HEAVY METALS SCREEN, BLOOD (QUANTITATIVE); Future; Expected date: 12/13/2023  -     Rheumatoid factor; Future; Expected date: 12/13/2023  -     Cyclic citrul peptide antibody, IgG; Future; Expected date: 12/13/2023  -     ANTI -SSA ANTIBODY; Future; Expected date: 12/13/2023  -     ANTI-SSB ANTIBODY; Future; Expected date: 12/13/2023  -     TSH; Future; Expected date: 12/13/2023  -     BASIC METABOLIC PANEL; Future; Expected date: 12/13/2023    4. H/o Lyme disease  -     Ambulatory referral/consult to Neurology; Future; Expected date: 12/20/2023  -     Anti-scleroderma antibody; Future; Expected date: 12/13/2023  -     RNA polymerase III Ab, IgG; Future; Expected date: 12/13/2023  -     PM-Scl Antibody by Immunodiffusion; Future; Expected date: 12/13/2023  -     Anti Sm/RNP Antibody; Future; Expected date: 12/13/2023  -     Th/To Antibody; Future; Expected date: 12/13/2023  -     MyoMarker Panel 3; Future; Expected date: 12/13/2023  -     ANTI-DNA ANTIBODY, DOUBLE-STRANDED; Future; Expected date: 12/13/2023  -     ANTI-HISTONE ANTIBODY; Future; Expected date: 12/13/2023  -     MELYSSA; Future; Expected date: 12/13/2023  -     HEAVY METALS SCREEN, BLOOD (QUANTITATIVE); Future; Expected date: 12/13/2023  -     Rheumatoid factor; Future; Expected date: 12/13/2023  -     Cyclic citrul peptide antibody, IgG; Future; Expected date: 12/13/2023  -     ANTI -SSA ANTIBODY; Future; Expected date: 12/13/2023  -     ANTI-SSB ANTIBODY; Future; Expected date: 12/13/2023  -     TSH; Future; Expected date: 12/13/2023  -     BASIC METABOLIC PANEL; Future; Expected date: 12/13/2023    5. Plaque psoriasis  -     Anti-scleroderma antibody; Future; Expected date: 12/13/2023  -     RNA polymerase III Ab, IgG; Future; Expected date: 12/13/2023  -     PM-Scl Antibody by Immunodiffusion; Future; Expected date:  12/13/2023  -     Anti Sm/RNP Antibody; Future; Expected date: 12/13/2023  -     Th/To Antibody; Future; Expected date: 12/13/2023  -     MyoMarker Panel 3; Future; Expected date: 12/13/2023  -     ANTI-DNA ANTIBODY, DOUBLE-STRANDED; Future; Expected date: 12/13/2023  -     ANTI-HISTONE ANTIBODY; Future; Expected date: 12/13/2023  -     MELYSSA; Future; Expected date: 12/13/2023  -     HEAVY METALS SCREEN, BLOOD (QUANTITATIVE); Future; Expected date: 12/13/2023  -     Rheumatoid factor; Future; Expected date: 12/13/2023  -     Cyclic citrul peptide antibody, IgG; Future; Expected date: 12/13/2023  -     ANTI -SSA ANTIBODY; Future; Expected date: 12/13/2023  -     ANTI-SSB ANTIBODY; Future; Expected date: 12/13/2023  -     TSH; Future; Expected date: 12/13/2023  -     BASIC METABOLIC PANEL; Future; Expected date: 12/13/2023    6. Migraine without aura and without status migrainosus, not intractable  -     Anti-scleroderma antibody; Future; Expected date: 12/13/2023  -     RNA polymerase III Ab, IgG; Future; Expected date: 12/13/2023  -     PM-Scl Antibody by Immunodiffusion; Future; Expected date: 12/13/2023  -     Anti Sm/RNP Antibody; Future; Expected date: 12/13/2023  -     Th/To Antibody; Future; Expected date: 12/13/2023  -     MyoMarker Panel 3; Future; Expected date: 12/13/2023  -     ANTI-DNA ANTIBODY, DOUBLE-STRANDED; Future; Expected date: 12/13/2023  -     ANTI-HISTONE ANTIBODY; Future; Expected date: 12/13/2023  -     MELYSSA; Future; Expected date: 12/13/2023  -     HEAVY METALS SCREEN, BLOOD (QUANTITATIVE); Future; Expected date: 12/13/2023  -     Rheumatoid factor; Future; Expected date: 12/13/2023  -     Cyclic citrul peptide antibody, IgG; Future; Expected date: 12/13/2023  -     ANTI -SSA ANTIBODY; Future; Expected date: 12/13/2023  -     ANTI-SSB ANTIBODY; Future; Expected date: 12/13/2023  -     TSH; Future; Expected date: 12/13/2023  -     BASIC METABOLIC PANEL; Future; Expected date: 12/13/2023    7.  Muscle spasms of lower extremity, unspecified laterality  -     Anti-scleroderma antibody; Future; Expected date: 12/13/2023  -     RNA polymerase III Ab, IgG; Future; Expected date: 12/13/2023  -     PM-Scl Antibody by Immunodiffusion; Future; Expected date: 12/13/2023  -     Anti Sm/RNP Antibody; Future; Expected date: 12/13/2023  -     Th/To Antibody; Future; Expected date: 12/13/2023  -     MyoMarker Panel 3; Future; Expected date: 12/13/2023  -     ANTI-DNA ANTIBODY, DOUBLE-STRANDED; Future; Expected date: 12/13/2023  -     ANTI-HISTONE ANTIBODY; Future; Expected date: 12/13/2023  -     MELYSSA; Future; Expected date: 12/13/2023  -     HEAVY METALS SCREEN, BLOOD (QUANTITATIVE); Future; Expected date: 12/13/2023  -     Rheumatoid factor; Future; Expected date: 12/13/2023  -     Cyclic citrul peptide antibody, IgG; Future; Expected date: 12/13/2023  -     ANTI -SSA ANTIBODY; Future; Expected date: 12/13/2023  -     ANTI-SSB ANTIBODY; Future; Expected date: 12/13/2023  -     TSH; Future; Expected date: 12/13/2023  -     BASIC METABOLIC PANEL; Future; Expected date: 12/13/2023    8. Lyme arthritis  -     Anti-scleroderma antibody; Future; Expected date: 12/13/2023  -     RNA polymerase III Ab, IgG; Future; Expected date: 12/13/2023  -     PM-Scl Antibody by Immunodiffusion; Future; Expected date: 12/13/2023  -     Anti Sm/RNP Antibody; Future; Expected date: 12/13/2023  -     Th/To Antibody; Future; Expected date: 12/13/2023  -     MyoMarker Panel 3; Future; Expected date: 12/13/2023  -     ANTI-DNA ANTIBODY, DOUBLE-STRANDED; Future; Expected date: 12/13/2023  -     ANTI-HISTONE ANTIBODY; Future; Expected date: 12/13/2023  -     MELYSSA; Future; Expected date: 12/13/2023  -     HEAVY METALS SCREEN, BLOOD (QUANTITATIVE); Future; Expected date: 12/13/2023  -     Rheumatoid factor; Future; Expected date: 12/13/2023  -     Cyclic citrul peptide antibody, IgG; Future; Expected date: 12/13/2023  -     ANTI -SSA ANTIBODY; Future;  Expected date: 12/13/2023  -     ANTI-SSB ANTIBODY; Future; Expected date: 12/13/2023  -     TSH; Future; Expected date: 12/13/2023  -     BASIC METABOLIC PANEL; Future; Expected date: 12/13/2023           Nicol Shell MD  Internal Medicine         Portions of this note may have been generated using voice recognition software.  Please excuse any spelling/grammatical errors. Occasional wrong-word or sound-a-like substitutions may have also occurred due to the inherent limitations of voice recognition software. Please read the chart carefully and recognize, using context, where substitutions have occurred.      Time spent in the evaluation and management of this patient exceeded 50 min and greater than 50% of this time was in face-to-face time with the patient on day of the clinic visit.   This includes face-to-face time and non face-to-face time and includes the following:  --preparing to see the patient (eg, obtaining and/or reviewing old records such as, when applicable, primary care notes, specialist notes, hospital notes, review of laboratory tests, and/or radiographic and/or cardiology or other studies)  --performing a medically appropriate review of systems and examination and/or evaluation  --reviewing and independently interpreting results (not separately reported; eg, lab results) and communicating results to the patient and/or family/caregiver  --placing orders and/or reviewing other physician's orders which can both include medications, laboratory studies, radiographic studies, procedures, referrals etcetera and   --counseling and educating the patient and/or family member/caregiver regarding the treatment plan  --documentation of the visit in the electronic health record  --communicating with other health care providers regarding referrals, studies, follow-up, etc    Answers submitted by the patient for this visit:  Review of Systems Questionnaire (Submitted on 12/13/2023)  activity change:  No  unexpected weight change: No  neck pain: No  hearing loss: No  rhinorrhea: No  trouble swallowing: No  eye discharge: No  visual disturbance: No  chest tightness: No  wheezing: No  chest pain: No  palpitations: No  blood in stool: No  constipation: No  vomiting: No  diarrhea: No  polydipsia: No  polyuria: No  difficulty urinating: No  hematuria: No  menstrual problem: No  dysuria: No  joint swelling: No  arthralgias: No  headaches: No  weakness: No  confusion: No  dysphoric mood: No

## 2023-12-14 LAB
ANA SER QL IF: NORMAL
ANTI SM/RNP ANTIBODY: 0.08 RATIO (ref 0–0.99)
ANTI-SM/RNP INTERPRETATION: NEGATIVE
ANTI-SSA ANTIBODY: 0.07 RATIO (ref 0–0.99)
ANTI-SSA INTERPRETATION: NEGATIVE
ANTI-SSB ANTIBODY: 0.11 RATIO (ref 0–0.99)
ANTI-SSB INTERPRETATION: NEGATIVE
DSDNA AB SER-ACNC: NORMAL [IU]/ML

## 2023-12-15 ENCOUNTER — PATIENT MESSAGE (OUTPATIENT)
Dept: PRIMARY CARE CLINIC | Facility: CLINIC | Age: 23
End: 2023-12-15
Payer: COMMERCIAL

## 2023-12-15 LAB
ARSENIC BLD-MCNC: <1 NG/ML
CADMIUM BLD-MCNC: 0.2 NG/ML
CITY: NORMAL
COUNTY: NORMAL
ENA SCL70 IGG SER IA-ACNC: 0.2 U
GUARDIAN FIRST NAME: NORMAL
GUARDIAN LAST NAME: NORMAL
HOME PHONE: NORMAL
LEAD BLD-MCNC: <1 MCG/DL
MERCURY BLD-MCNC: <1 NG/ML
RACE: NORMAL
STATE: NORMAL
STREET ADDRESS: NORMAL
VENOUS/CAPILLARY: NORMAL
ZIP: NORMAL

## 2023-12-16 LAB — HISTONE IGG SER IA-ACNC: 1.1 UNITS (ref 0–0.9)

## 2023-12-18 LAB — ENA SCL70 AB SER-ACNC: <0.6 U/ML

## 2023-12-23 LAB — RNAP III AB SER-ACNC: <20 UNITS

## 2023-12-28 LAB

## 2024-01-17 ENCOUNTER — OFFICE VISIT (OUTPATIENT)
Dept: NEUROLOGY | Facility: CLINIC | Age: 24
End: 2024-01-17
Payer: COMMERCIAL

## 2024-01-17 ENCOUNTER — LAB VISIT (OUTPATIENT)
Dept: LAB | Facility: HOSPITAL | Age: 24
End: 2024-01-17
Payer: COMMERCIAL

## 2024-01-17 VITALS
HEART RATE: 78 BPM | SYSTOLIC BLOOD PRESSURE: 124 MMHG | WEIGHT: 150 LBS | BODY MASS INDEX: 22.73 KG/M2 | DIASTOLIC BLOOD PRESSURE: 75 MMHG | HEIGHT: 68 IN

## 2024-01-17 DIAGNOSIS — R25.1 TREMOR: Primary | ICD-10-CM

## 2024-01-17 DIAGNOSIS — R26.9 GAIT ABNORMALITY: ICD-10-CM

## 2024-01-17 DIAGNOSIS — R29.90 NEUROLOGICAL DYSFUNCTION: ICD-10-CM

## 2024-01-17 DIAGNOSIS — R25.1 TREMOR: ICD-10-CM

## 2024-01-17 DIAGNOSIS — F44.4 FUNCTIONAL NEUROLOGICAL SYMPTOM DISORDER WITH ABNORMAL MOVEMENT: ICD-10-CM

## 2024-01-17 DIAGNOSIS — R56.9 SEIZURE-LIKE ACTIVITY: ICD-10-CM

## 2024-01-17 DIAGNOSIS — M79.605 PAIN OF LEFT LOWER EXTREMITY: ICD-10-CM

## 2024-01-17 LAB
FOLATE SERPL-MCNC: 8.3 NG/ML (ref 4–24)
VIT B12 SERPL-MCNC: 1058 PG/ML (ref 210–950)

## 2024-01-17 PROCEDURE — 3074F SYST BP LT 130 MM HG: CPT | Mod: CPTII,S$GLB,, | Performed by: PHYSICIAN ASSISTANT

## 2024-01-17 PROCEDURE — 99215 OFFICE O/P EST HI 40 MIN: CPT | Mod: S$GLB,,, | Performed by: PHYSICIAN ASSISTANT

## 2024-01-17 PROCEDURE — 86255 FLUORESCENT ANTIBODY SCREEN: CPT | Mod: 59 | Performed by: PHYSICIAN ASSISTANT

## 2024-01-17 PROCEDURE — 99999 PR PBB SHADOW E&M-EST. PATIENT-LVL V: CPT | Mod: PBBFAC,,, | Performed by: PHYSICIAN ASSISTANT

## 2024-01-17 PROCEDURE — 1160F RVW MEDS BY RX/DR IN RCRD: CPT | Mod: CPTII,S$GLB,, | Performed by: PHYSICIAN ASSISTANT

## 2024-01-17 PROCEDURE — 3008F BODY MASS INDEX DOCD: CPT | Mod: CPTII,S$GLB,, | Performed by: PHYSICIAN ASSISTANT

## 2024-01-17 PROCEDURE — 84425 ASSAY OF VITAMIN B-1: CPT | Performed by: PHYSICIAN ASSISTANT

## 2024-01-17 PROCEDURE — 86596 VOLTAGE-GTD CA CHNL ANTB EA: CPT | Performed by: PHYSICIAN ASSISTANT

## 2024-01-17 PROCEDURE — 36415 COLL VENOUS BLD VENIPUNCTURE: CPT | Performed by: PHYSICIAN ASSISTANT

## 2024-01-17 PROCEDURE — 82607 VITAMIN B-12: CPT | Performed by: PHYSICIAN ASSISTANT

## 2024-01-17 PROCEDURE — 1159F MED LIST DOCD IN RCRD: CPT | Mod: CPTII,S$GLB,, | Performed by: PHYSICIAN ASSISTANT

## 2024-01-17 PROCEDURE — 3078F DIAST BP <80 MM HG: CPT | Mod: CPTII,S$GLB,, | Performed by: PHYSICIAN ASSISTANT

## 2024-01-17 PROCEDURE — 82525 ASSAY OF COPPER: CPT | Performed by: PHYSICIAN ASSISTANT

## 2024-01-17 PROCEDURE — 82746 ASSAY OF FOLIC ACID SERUM: CPT | Performed by: PHYSICIAN ASSISTANT

## 2024-01-17 PROCEDURE — 84630 ASSAY OF ZINC: CPT | Performed by: PHYSICIAN ASSISTANT

## 2024-01-17 RX ORDER — PROPRANOLOL HYDROCHLORIDE 80 MG/1
80 CAPSULE, EXTENDED RELEASE ORAL DAILY
Qty: 30 CAPSULE | Refills: 11 | Status: SHIPPED | OUTPATIENT
Start: 2024-01-17 | End: 2024-04-16 | Stop reason: SDUPTHER
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
## 2024-01-19 ENCOUNTER — HOSPITAL ENCOUNTER (OUTPATIENT)
Dept: RADIOLOGY | Facility: HOSPITAL | Age: 24
Discharge: HOME | End: 2024-01-19
Attending: STUDENT IN AN ORGANIZED HEALTH CARE EDUCATION/TRAINING PROGRAM
Payer: COMMERCIAL

## 2024-01-19 DIAGNOSIS — Z83.2 FAMILY HISTORY OF AUTOIMMUNE DISORDER: ICD-10-CM

## 2024-01-19 DIAGNOSIS — G12.20 MOTOR NEURON DISEASE, UNSPECIFIED: ICD-10-CM

## 2024-01-19 DIAGNOSIS — L40.0 PLAQUE PSORIASIS: ICD-10-CM

## 2024-01-19 DIAGNOSIS — R25.1 TREMORS OF NERVOUS SYSTEM: ICD-10-CM

## 2024-01-19 DIAGNOSIS — G43.009 MIGRAINE WITHOUT AURA AND WITHOUT STATUS MIGRAINOSUS, NOT INTRACTABLE: ICD-10-CM

## 2024-01-19 DIAGNOSIS — A69.23 LYME ARTHRITIS: ICD-10-CM

## 2024-01-19 DIAGNOSIS — M62.838 MUSCLE SPASMS OF LOWER EXTREMITY, UNSPECIFIED LATERALITY: ICD-10-CM

## 2024-01-19 DIAGNOSIS — Z86.19 H/O LYME DISEASE: ICD-10-CM

## 2024-01-19 DIAGNOSIS — R29.90 NEUROLOGICAL DYSFUNCTION: ICD-10-CM

## 2024-01-19 PROCEDURE — 71250 CT THORAX DX C-: CPT | Mod: 26,,, | Performed by: STUDENT IN AN ORGANIZED HEALTH CARE EDUCATION/TRAINING PROGRAM

## 2024-01-19 PROCEDURE — 72148 MRI LUMBAR SPINE W/O DYE: CPT | Mod: TC

## 2024-01-19 PROCEDURE — 72141 MRI NECK SPINE W/O DYE: CPT | Mod: TC

## 2024-01-19 PROCEDURE — 71250 CT THORAX DX C-: CPT | Mod: TC

## 2024-01-19 PROCEDURE — 72141 MRI NECK SPINE W/O DYE: CPT | Mod: 26,,, | Performed by: RADIOLOGY

## 2024-01-19 PROCEDURE — 72148 MRI LUMBAR SPINE W/O DYE: CPT | Mod: 26,,, | Performed by: RADIOLOGY

## 2024-01-23 DIAGNOSIS — M50.90 CERVICAL DISC DISEASE: Primary | ICD-10-CM

## 2024-01-23 LAB
COPPER SERPL-MCNC: 910 UG/L (ref 810–1990)
VIT B1 BLD-MCNC: 60 UG/L (ref 38–122)
ZINC SERPL-MCNC: 68 UG/DL (ref 60–130)

## 2024-01-24 ENCOUNTER — HOSPITAL ENCOUNTER (OUTPATIENT)
Dept: RADIOLOGY | Facility: HOSPITAL | Age: 24
Discharge: HOME | End: 2024-01-24
Attending: REGISTERED NURSE
Payer: COMMERCIAL

## 2024-01-24 ENCOUNTER — OFFICE VISIT (OUTPATIENT)
Dept: ORTHOPEDICS | Facility: CLINIC | Age: 24
End: 2024-01-24
Payer: COMMERCIAL

## 2024-01-24 ENCOUNTER — TELEPHONE (OUTPATIENT)
Dept: ORTHOPEDICS | Facility: CLINIC | Age: 24
End: 2024-01-24
Payer: COMMERCIAL

## 2024-01-24 VITALS — BODY MASS INDEX: 23.51 KG/M2 | HEIGHT: 68 IN | WEIGHT: 155.13 LBS

## 2024-01-24 DIAGNOSIS — G89.29 CHRONIC BILATERAL LOW BACK PAIN WITHOUT SCIATICA: Primary | ICD-10-CM

## 2024-01-24 DIAGNOSIS — M25.561 CHRONIC PAIN OF BOTH KNEES: ICD-10-CM

## 2024-01-24 DIAGNOSIS — M50.30 DDD (DEGENERATIVE DISC DISEASE), CERVICAL: Primary | ICD-10-CM

## 2024-01-24 DIAGNOSIS — M50.30 DDD (DEGENERATIVE DISC DISEASE), CERVICAL: ICD-10-CM

## 2024-01-24 DIAGNOSIS — M25.562 CHRONIC PAIN OF BOTH KNEES: ICD-10-CM

## 2024-01-24 DIAGNOSIS — G89.29 CHRONIC PAIN OF BOTH KNEES: ICD-10-CM

## 2024-01-24 DIAGNOSIS — M54.50 CHRONIC BILATERAL LOW BACK PAIN WITHOUT SCIATICA: Primary | ICD-10-CM

## 2024-01-24 DIAGNOSIS — M50.90 CERVICAL DISC DISEASE: ICD-10-CM

## 2024-01-24 PROCEDURE — 1159F MED LIST DOCD IN RCRD: CPT | Mod: CPTII,S$GLB,, | Performed by: REGISTERED NURSE

## 2024-01-24 PROCEDURE — 72050 X-RAY EXAM NECK SPINE 4/5VWS: CPT | Mod: TC

## 2024-01-24 PROCEDURE — 1160F RVW MEDS BY RX/DR IN RCRD: CPT | Mod: CPTII,S$GLB,, | Performed by: REGISTERED NURSE

## 2024-01-24 PROCEDURE — 99999 PR PBB SHADOW E&M-EST. PATIENT-LVL III: CPT | Mod: PBBFAC,,, | Performed by: REGISTERED NURSE

## 2024-01-24 PROCEDURE — 72050 X-RAY EXAM NECK SPINE 4/5VWS: CPT | Mod: 26,,, | Performed by: RADIOLOGY

## 2024-01-24 PROCEDURE — 99204 OFFICE O/P NEW MOD 45 MIN: CPT | Mod: S$GLB,,, | Performed by: REGISTERED NURSE

## 2024-01-24 PROCEDURE — 3008F BODY MASS INDEX DOCD: CPT | Mod: CPTII,S$GLB,, | Performed by: REGISTERED NURSE

## 2024-01-24 RX ORDER — METHOCARBAMOL 500 MG/1
500-1000 TABLET, FILM COATED ORAL 3 TIMES DAILY PRN
Qty: 60 TABLET | Refills: 4 | Status: SHIPPED | OUTPATIENT
Start: 2024-01-24 | End: 2024-03-14

## 2024-01-24 NOTE — TELEPHONE ENCOUNTER
Attempt to contact patient regarding x-ray. Left message stating that her x-ray are scheduled for 11:45 am, but will arrive for 12:00 pm. Also left number for patient to return call back to [phone]. Thanks.

## 2024-01-24 NOTE — PROGRESS NOTES
DATE: 1/24/2024  PATIENT: Kiya Renae    Supervising Physician: Jayme Scruggs M.D.    CHIEF COMPLAINT: left hand tremor and left leg weakness    HISTORY:  Kiya Renae is a 23 y.o. female with pmhx of migraines, seizures, trigeminal neuralgia, plaque psoriasis, psoriatic arthritis, h/o lyme disease, anxiety and depression who presents for tremors  here for initial evaluation of left hand tremor and left leg and big toe weakness. Denies pain. Tremors in her left hand for several months and sometimes it does radiate up her arm that causes her head to shake.The symptoms have been present for the past 6 months. 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  She is established with neurology and movement disorders. Prior treatments have included robaxin 500mg, propranolol 10 mg daily and topamax 25 mg daily, but no BELLE or surgery. The patient denies myelopathic symptoms such as handwriting changes or difficulty with buttons/coins/keys. Denies perineal paresthesias, bowel/bladder dysfunction.    PAST MEDICAL/SURGICAL HISTORY:  Past Medical History:   Diagnosis Date    Anxiety 07/31/2021    Arthritis 12/30/2021    PsA    Joint pain     Lyme disease, unspecified     Psoriasis     Skin disease 12/30/2021    psorias on scalp, behind ears, and inside ear canal    Subclinical hypothyroidism 7/5/2023     Past Surgical History:   Procedure Laterality Date    COLONOSCOPY N/A 8/22/2023    Procedure: COLONOSCOPY;  Surgeon: Stanton Ferrell MD;  Location: [facility];  Service: Endoscopy;  Laterality: N/A;       Medications:   Current Outpatient Medications on File Prior to Visit   Medication Sig Dispense Refill    acetaminophen (TYLENOL) 500 MG tablet Take 1 tablet (500 mg total) by mouth every 6 (six) hours as needed for Pain. 20 tablet 0    bacitracin 500 unit/gram Oint Apply topically 2 (two) times daily. 30 g 0    clobetasoL (TEMOVATE)  0.05 % external solution Apply topically once daily. Use to affected areas for up to 2 weeks then take a 1 week break or decrease to 3 times weekly. Do not apply to groin or face 50 mL 2    dicyclomine (BENTYL) 20 mg tablet Take 1 tablet (20 mg total) by mouth every 8 (eight) hours as needed (abdominal cramping). 120 tablet 0    fluocinolone acetonide oiL 0.01 % Drop Place 1 application in ear(s) once daily. Use to affected areas for up to 2 weeks then take a 1 week break or decrease to 3 times weekly. For use in ears 20 mL 2    gabapentin (NEURONTIN) 100 MG capsule Take 100 mg by mouth daily as needed.      pantoprazole (PROTONIX) 40 MG tablet Take 1 tablet (40 mg total) by mouth once daily. 30 tablet 3    propranoloL (INDERAL LA) 80 MG 24 hr capsule Take 1 capsule (80 mg total) by mouth once daily. 30 capsule 11    risankizumab-rzaa (SKYRIZI) 150 mg/mL PnIj       sertraline (ZOLOFT) 100 MG tablet       sertraline (ZOLOFT) 50 MG tablet Take 50 mg by mouth.      topiramate (TOPAMAX) 25 MG tablet Take 1 tablet (25 mg total) by mouth once daily. 30 tablet 0    tretinoin (RETIN-A) 0.05 % cream Apply topically every evening. Start twice weekly and increase as tolerated. Pea sized amount to entire face. 20 g 3    [DISCONTINUED] methocarbamoL (ROBAXIN) 500 MG Tab Take 1 tablet (500 mg total) by mouth 2 (two) times daily as needed (muscle spasms). This medication can cause sedation. Try on a day you do not have to work the next day. Do not mix with alcohol or any other sedating meds (such as sleep aids, opioids, or benzos). Caution with driving or operating heavy machinery. 30 tablet 0     No current facility-administered medications on file prior to visit.       Social History:   Social History     Socioeconomic History    Marital status: Single   Tobacco Use    Smoking status: Never     Passive exposure: Never    Smokeless tobacco: Never   Substance and Sexual Activity    Alcohol use: Not Currently     Comment: only  socially and a very small amount if ever (one or two )    Drug use: Never    Sexual activity: Never   Other Topics Concern    Are you pregnant or think you may be? No    Breast-feeding No     Social Determinants of Health     Financial Resource Strain: Low Risk  (11/10/2023)    Overall Financial Resource Strain (CARDIA)     Difficulty of Paying Living Expenses: Not very hard   Food Insecurity: No Food Insecurity (11/10/2023)    Hunger Vital Sign     Worried About Running Out of Food in the Last Year: Never true     Ran Out of Food in the Last Year: Never true   Transportation Needs: No Transportation Needs (11/10/2023)    PRAPARE - Transportation     Lack of Transportation (Medical): No     Lack of Transportation (Non-Medical): No   Physical Activity: Sufficiently Active (11/10/2023)    Exercise Vital Sign     Days of Exercise per Week: 6 days     Minutes of Exercise per Session: 60 min   Stress: Stress Concern Present (11/10/2023)    [employer] [place] [employer] - Occupational Stress Questionnaire     Feeling of Stress : To some extent   Social Connections: Unknown (11/10/2023)    Social Connection and Isolation Panel [NHANES]     Frequency of Communication with Friends and Family: More than three times a week     Frequency of Social Gatherings with Friends and Family: More than three times a week     Active Member of Clubs or Organizations: Yes     Attends Club or Organization Meetings: More than 4 times per year     Marital Status: Never    Housing Stability: Unknown (11/10/2023)    Housing Stability Vital Sign     Unable to Pay for Housing in the Last Year: No     Unstable Housing in the Last Year: No       REVIEW OF SYSTEMS:  Constitution: Negative. Negative for chills, fever and night sweats.   Cardiovascular: Negative for chest pain and syncope.   Respiratory: Negative for cough and shortness of breath.   Gastrointestinal: See HPI. Negative for nausea/vomiting. Negative for abdominal  "pain.  Genitourinary: See HPI. Negative for discoloration or dysuria.  Skin: Negative for dry skin, itching and rash.   Hematologic/Lymphatic: Negative for bleeding problem. Does not bruise/bleed easily.   Musculoskeletal: Negative for falls and muscle weakness.   Neurological: See HPI. No seizures.   Endocrine: Negative for polydipsia, polyphagia and polyuria.   Allergic/Immunologic: Negative for hives and persistent infections.     EXAM:  Ht 5' 8" (1.727 m)   Wt 70.4 kg (155 lb 1.5 oz)   BMI 23.58 kg/m²     PHYSICAL EXAMINATION:    General: The patient is a 23 y.o. female in no apparent distress, the patient is oriented to person, place and time.  Psych: Normal mood and affect  HEENT: Vision grossly intact, hearing intact to the spoken word.  Lungs: Respirations unlabored.  Gait: Normal station and gait, no difficulty with toe or heel walk.   Skin: Cervical skin and dorsal lumbar skin negative for rashes, lesions, hairy patches and surgical scars.    Range of motion: Cervical and lumbar range of motion is acceptable. There is mild tenderness to palpation of the paracervical muscles.  There is mild lumbar tenderness to palpation.  Spinal Balance: Global saggital and coronal spinal balance acceptable, no significant for scoliosis and kyphosis.  Musculoskeletal: No pain with the range of motion of the bilateral shoulders and elbows. Normal bulk and contour of the bilateral hands.  No pain with the range of motion of the bilateral hips. Mild bilateral trochanteric tenderness to palpation.  Vascular: Bilateral upper and lower extremities warm and well perfused, radial pulses 2+ bilaterally, dorsalis pedis pulses 2+ bilaterally.  Neurological: decreased strength and tone in all major motor groups in the left upper and lower extremities. decreased sensation to light touch in the C5-T1 and L2-S1 dermatomes in the LUE.  Deep tendon reflexes symmetric 2+ in the bilateral upper and lower extremities.  Negative Inverted " Radial Reflex and Harmon's bilaterally. Negative Babinski bilaterally. Negative straight leg raise bilaterally.     IMAGING:   Today I personally reviewed AP, Lat and Flex/Ex  upright C-spine films that demonstrate straitening of the lordotic curve.    AP, Lat and Flex/Ex upright lumbar spine films demonstrate no significant degenerative changes.    Cervical and lumbar MRIs show no stenosis or foraminal narrowing.      Body mass index is 23.58 kg/m².    Hemoglobin A1C   Date Value Ref Range Status   04/24/2023 5.1 4.0 - 5.6 % Final     Comment:     ADA Screening Guidelines:  5.7-6.4%  Consistent with prediabetes  >or=6.5%  Consistent with diabetes    High levels of fetal hemoglobin interfere with the HbA1C  assay. Heterozygous hemoglobin variants (HbS, HgC, etc)do  not significantly interfere with this assay.   However, presence of multiple variants may affect accuracy.           ASSESSMENT/PLAN:    Kiya was seen today for neck pain.    Diagnoses and all orders for this visit:    Chronic bilateral low back pain without sciatica  -     methocarbamoL (ROBAXIN) 500 MG Tab; Take 1-2 tablets (500-1,000 mg total) by mouth 3 (three) times daily as needed (muscle tension).    Cervical disc disease  -     Ambulatory referral/consult to Orthopedics    Chronic pain of both knees  -     methocarbamoL (ROBAXIN) 500 MG Tab; Take 1-2 tablets (500-1,000 mg total) by mouth 3 (three) times daily as needed (muscle tension).      Today we discussed at length all of the different treatment options including anti-inflammatories, acetaminophen, rest, ice, heat, physical therapy including strengthening and stretching exercises, home exercises, ROM, aerobic conditioning, aqua therapy, other modalities including ultrasound, massage, and dry needling, epidural steroid injections and finally surgical intervention.    I have encouraged the patient to continue follow up with neurology and movement disorders. Her tremors and weakness are  unrelated to stenosis.

## 2024-01-25 LAB
AMPA-R AB CBA, SERUM: NEGATIVE
AMPHIPHYSIN AB TITR SER: NEGATIVE {TITER}
AP3B2 IFA: NEGATIVE
CASPR2-IGG CBA: NEGATIVE
CV2 IGG SER QL IB: NEGATIVE
DPPX IGG SERPL QL IF: NEGATIVE
GABA-B-R AB CBA, SERUM: NEGATIVE
GAD65 AB SER-SCNC: 0.02 NMOL/L
GFAP ALPHA IGG SER QL IF: NEGATIVE
GLIAL NUC TYPE 1 AB TITR SER: NEGATIVE {TITER}
GRAF1 IFA,S: NEGATIVE
HU1 AB TITR SER: NEGATIVE {TITER}
HU2 AB TITR SER IF: NEGATIVE {TITER}
HU3 AB TITR SER: NEGATIVE {TITER}
IGLON5 IFA, S: NEGATIVE
IMMUNOLOGIST REVIEW: NORMAL
ITPR1 IFA, S: NEGATIVE
KELCH-LIKE PROTEIN ANTIBODY, SERUM: NEGATIVE
LGI1-IGG CBA: NEGATIVE
M SEPTIN-5 IFA, S: NEGATIVE
M SEPTIN-7 IFA, S: NEGATIVE
MGLUR1 AB IFA, SERUM: NEGATIVE
NEUROCHONDRIN, IFA, S: NEGATIVE
NIF IFA, S: NEGATIVE
NMDA-R AB CBA, SERUM: NEGATIVE
PCA-1 AB TITR SER: NEGATIVE {TITER}
PCA-2 AB TITR SER: NEGATIVE {TITER}
PCA-TR AB TITR SER: NEGATIVE {TITER}
VGCC-P/Q BIND AB SER-SCNC: 0 NMOL/L

## 2024-02-05 ENCOUNTER — OFFICE VISIT (OUTPATIENT)
Dept: RHEUMATOLOGY | Facility: CLINIC | Age: 24
End: 2024-02-05
Payer: COMMERCIAL

## 2024-02-05 VITALS
SYSTOLIC BLOOD PRESSURE: 108 MMHG | HEART RATE: 50 BPM | DIASTOLIC BLOOD PRESSURE: 70 MMHG | BODY MASS INDEX: 23.49 KG/M2 | HEIGHT: 68 IN | WEIGHT: 155 LBS

## 2024-02-05 DIAGNOSIS — R25.1 TREMOR: ICD-10-CM

## 2024-02-05 DIAGNOSIS — K50.90 CROHN'S DISEASE WITHOUT COMPLICATION, UNSPECIFIED GASTROINTESTINAL TRACT LOCATION: ICD-10-CM

## 2024-02-05 DIAGNOSIS — D84.821 DRUG-INDUCED IMMUNODEFICIENCY: ICD-10-CM

## 2024-02-05 DIAGNOSIS — L40.50 PSORIATIC ARTHRITIS: Primary | ICD-10-CM

## 2024-02-05 DIAGNOSIS — Z79.899 DRUG-INDUCED IMMUNODEFICIENCY: ICD-10-CM

## 2024-02-05 PROCEDURE — 99205 OFFICE O/P NEW HI 60 MIN: CPT | Mod: S$GLB,,, | Performed by: STUDENT IN AN ORGANIZED HEALTH CARE EDUCATION/TRAINING PROGRAM

## 2024-02-05 PROCEDURE — 3074F SYST BP LT 130 MM HG: CPT | Mod: CPTII,S$GLB,, | Performed by: STUDENT IN AN ORGANIZED HEALTH CARE EDUCATION/TRAINING PROGRAM

## 2024-02-05 PROCEDURE — 1159F MED LIST DOCD IN RCRD: CPT | Mod: CPTII,S$GLB,, | Performed by: STUDENT IN AN ORGANIZED HEALTH CARE EDUCATION/TRAINING PROGRAM

## 2024-02-05 PROCEDURE — 3008F BODY MASS INDEX DOCD: CPT | Mod: CPTII,S$GLB,, | Performed by: STUDENT IN AN ORGANIZED HEALTH CARE EDUCATION/TRAINING PROGRAM

## 2024-02-05 PROCEDURE — 99999 PR PBB SHADOW E&M-EST. PATIENT-LVL IV: CPT | Mod: PBBFAC,,, | Performed by: STUDENT IN AN ORGANIZED HEALTH CARE EDUCATION/TRAINING PROGRAM

## 2024-02-05 PROCEDURE — 3078F DIAST BP <80 MM HG: CPT | Mod: CPTII,S$GLB,, | Performed by: STUDENT IN AN ORGANIZED HEALTH CARE EDUCATION/TRAINING PROGRAM

## 2024-02-05 RX ORDER — FLUCONAZOLE 200 MG/1
200 TABLET ORAL
COMMUNITY
Start: 2024-01-17 | End: 2024-05-06

## 2024-02-05 NOTE — PROGRESS NOTES
RHEUMATOLOGY OUTPATIENT CLINIC NOTE    2/5/2024    Attending Rheumatologist: Trice Coombs  Primary Care Provider: Nicol Shell MD   Physician Requesting Consultation: Nicol Shell MD  [address] [place] [address]  Chief Complaint/Reason For Consultation:  Joint Pain      Subjective:       HPI  Kiya Renae is a 23 y.o. White female with Crohn's Disease, tremors who comes for evaluation of joint pain.     Used to follow with rheumatology in [place]. Records reviewed. Last visit in 7/2022.   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 Negative serologies. Methotrexate caused nausea. Failed nabumetone.   Otezla - developed increased depression and GI upset.   Skyrizi - significant improvement of joint and skin disease  Then, diagnosed with Crohn's Disease - skyrizi dose was increased to IBD dose. This was in 8/2023 by GI in [place].   Colonoscopy in 8/2023 reportedly normal. However GI from [place] felt that there were some active lesions from pictures on colonoscopy.     She has history of lyme disease. Was on minocycline for a while. Stopped about a year and a half.     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   Has been dealing with different neurological issues. Significant tremor in left hand along with left leg numbness at times. Sees neurology. So far, workup has been unrevealing. MRI brain and c-spine are essentially unremarkable. EMG/NCS of left upper and lower extremity was normal. Autoimmune movement disorder panel is negative. Symptoms improve with propanolol high doses which she is able to tolerate. She is concerned that this could be related to MS.     Her psoriasis and joint pain have been stable. No more dactylitis. Occasional low back pain. No eye inflammation.   PCP prescribed diflucan for possible fungal infection as she has been dealing with cracked lip commissure. Also feels that her sinuses are clogged. She notes that this  happens almost at the end of the skyrizi cycle, and symptoms slowly start to improve afterwards.   She is due for skyrizi tomorrow. Receives refills from her GI in [place]  She reports that last week she had one episode of bloody stool and since then has constipation. She has not tried any OTC medications.       12/2023  MELYSSA negative  Anti histone 1.1 (<0.9)  Negative DSDNA, SSA, SSB, RNA eula 3, Scl-70, SM/RNP, TH/To  RF, CCP negative  Extended myositis panel negative    9/2023  Lyme antibodies negative  TPO negative  ESR and CRP negative    Chronic comorbid conditions affecting medical decision making today:  Past Medical History:   Diagnosis Date    Allergy     Anxiety 07/31/2021    Arthritis 12/30/2021    PsA    Joint pain     Lyme disease, unspecified     Psoriasis     Skin disease 12/30/2021    psorias on scalp, behind ears, and inside ear canal    Subclinical hypothyroidism 07/05/2023     Past Surgical History:   Procedure Laterality Date    COLONOSCOPY N/A 8/22/2023    Procedure: COLONOSCOPY;  Surgeon: Stanton Ferrell MD;  Location: [facility];  Service: Endoscopy;  Laterality: N/A;     Family History   Problem Relation Age of Onset    Alcohol abuse Mother     Asthma Mother     Rheum arthritis Mother     Psoriasis Mother     Alcohol abuse Father     Migraines Father     Acne Father     Depression Brother     Suicidality Brother     Eczema Brother     Diabetes Maternal Grandmother     Heart failure Maternal Grandfather     Breast cancer Paternal Grandmother     Heart attack Paternal Grandmother     Breast cancer Paternal Aunt     Lupus Paternal Aunt     Migraines Paternal Aunt      Social History     Substance and Sexual Activity   Alcohol Use Not Currently    Comment: only socially and a very small amount if ever (one or two )     Social History     Tobacco Use   Smoking Status Never    Passive exposure: Never   Smokeless Tobacco Never     Social History     Substance and Sexual Activity   Drug Use Never        Current Outpatient Medications:     acetaminophen (TYLENOL) 500 MG tablet, Take 1 tablet (500 mg total) by mouth every 6 (six) hours as needed for Pain., Disp: 20 tablet, Rfl: 0    bacitracin 500 unit/gram Oint, Apply topically 2 (two) times daily., Disp: 30 g, Rfl: 0    clobetasoL (TEMOVATE) 0.05 % external solution, Apply topically once daily. Use to affected areas for up to 2 weeks then take a 1 week break or decrease to 3 times weekly. Do not apply to groin or face, Disp: 50 mL, Rfl: 2    dicyclomine (BENTYL) 20 mg tablet, Take 1 tablet (20 mg total) by mouth every 8 (eight) hours as needed (abdominal cramping)., Disp: 120 tablet, Rfl: 0    fluconazole (DIFLUCAN) 200 MG Tab, Take 200 mg by mouth., Disp: , Rfl:     fluocinolone acetonide oiL 0.01 % Drop, Place 1 application in ear(s) once daily. Use to affected areas for up to 2 weeks then take a 1 week break or decrease to 3 times weekly. For use in ears, Disp: 20 mL, Rfl: 2    gabapentin (NEURONTIN) 100 MG capsule, Take 100 mg by mouth daily as needed., Disp: , Rfl:     methocarbamoL (ROBAXIN) 500 MG Tab, Take 1-2 tablets (500-1,000 mg total) by mouth 3 (three) times daily as needed (muscle tension)., Disp: 60 tablet, Rfl: 4    pantoprazole (PROTONIX) 40 MG tablet, Take 1 tablet (40 mg total) by mouth once daily., Disp: 30 tablet, Rfl: 3    propranoloL (INDERAL LA) 80 MG 24 hr capsule, Take 1 capsule (80 mg total) by mouth once daily., Disp: 30 capsule, Rfl: 11    risankizumab-rzaa (SKYRIZI) 150 mg/mL PnIj, , Disp: , Rfl:     sertraline 200 mg Cap, , Disp: , Rfl:     topiramate (TOPAMAX) 25 MG tablet, Take 1 tablet (25 mg total) by mouth once daily., Disp: 30 tablet, Rfl: 0    tretinoin (RETIN-A) 0.05 % cream, Apply topically every evening. Start twice weekly and increase as tolerated. Pea sized amount to entire face., Disp: 20 g, Rfl: 3     Objective:         Vitals:    02/05/24 1609   BP: 108/70   Pulse: (!) 50     Physical Exam   Constitutional: She is  oriented to person, place, and time. She appears well-developed.   HENT:   Head: Normocephalic.   Mouth/Throat: Cracking in lip commissure   Pulmonary/Chest: Effort normal.   Musculoskeletal:      Cervical back: Neck supple.      Comments: Cspine FROM no tenderness  Tspine FROM no tenderness  Lspine FROM no tenderness.  Shoulders: FROM; no synovitis;  Elbows: FROM; no synovitis; no tophi or nodules  Wrists: FROM; no synovitis;    MCPs: FROM; no synovitis;   PIPs:FROM; no synovitis;   DIPs: FROM; no synovitis;   HIPS: FROM  Knees: FROM; no synovitis; no instability  Ankles: FROM: no synovitis   Toes: no tenderness on palpation.     Lymphadenopathy:     She has no cervical adenopathy.   Neurological: She is alert and oriented to person, place, and time.   Skin: No rash noted.   No skin rashes   Vitals reviewed.      Reviewed old and all outside pertinent medical records available.    All lab results personally reviewed and interpreted by me.  Lab Results   Component Value Date    WBC 9.62 09/05/2023    HGB 14.2 09/05/2023    HCT 41.0 09/05/2023    MCV 89 09/05/2023    MCH 30.9 09/05/2023    MCHC 34.6 09/05/2023    RDW 12.1 09/05/2023     09/05/2023    MPV 12.4 09/05/2023       Lab Results   Component Value Date     12/13/2023    K 4.0 12/13/2023     12/13/2023    CO2 25 12/13/2023    GLU 57 (L) 12/13/2023    BUN 16 12/13/2023    CALCIUM 9.2 12/13/2023    PROT 7.2 09/05/2023    ALBUMIN 4.5 09/05/2023    BILITOT 0.2 09/05/2023    AST 13 09/05/2023    ALKPHOS 65 09/05/2023    ALT 8 (L) 09/05/2023       Lab Results   Component Value Date    COLORU Yellow 09/05/2023    APPEARANCEUA Clear 09/05/2023    SPECGRAV >1.030 (A) 09/05/2023    PHUR 7.0 09/05/2023    PROTEINUA Trace (A) 09/05/2023    KETONESU Negative 09/05/2023    LEUKOCYTESUR 1+ (A) 09/05/2023    NITRITE Negative 09/05/2023    UROBILINOGEN Negative 09/05/2023       Lab Results   Component Value Date    CRP 0.1 09/15/2023       Lab Results  "  Component Value Date    RF <13.0 12/13/2023    SEDRATE <2 09/15/2023    CCPANTIBODIE 0.7 12/13/2023       No components found for: "25OHVITDTOT", "31IBGWGH0", "23ZZSHCD1", "METHODNOTE"    No results found for: "URICACID"    Lab Results   Component Value Date    HEPCAB Non-reactive 04/24/2023       Imaging:  All imaging reviewed and independently interpreted by me.         ASSESSMENT / PLAN:     Kiya Rneae is a 23 y.o. White female with:    1. Psoriatic arthritis  - stable  - continue skyrizi at the IBD dose per GI.   - if worsening, can consider methotrexate, [place] or [place]-12/23. Would avoid TNF given neurological symptoms for now.     2. Drug-induced immunodeficiency  - recent labs reviewed  - no live vaccines  - vaccines per guidelines   - immunosuppression/infectious precautions reinforced      3. Crohn's disease without complication, unspecified gastrointestinal tract location  - appears to be stable on skyrizi  - had one episode of bloody stool last week and now has constipation.   - advised her to reach out to GI for further recommendations.     4. Tremor  - Unknown etiology. Workup per neurology unrevealing. No evidence of MS, which is patient's biggest concern. MELYSSA negative. Extensive antibody testing has been negative with exception of borderline anti histone which can be positive due to prior use of minocycline  - controlled on propanolol   - defer management to neurology     Follow up in about 3 months (around 5/5/2024).    Method of contact with patient concerns: [facility]    Disclaimer:  This note is prepared using voice recognition software and as such is likely to have errors and has not been proof read. Please contact me for questions.     Time spent: 60 minutes in face to face discussion concerning diagnosis, prognosis, review of lab and test results, benefits of treatment as well as management of disease, counseling of patient and coordination of care between various health " care providers.  Greater than half the time spent was used for coordination of care and counseling of patient.    Trice Coombs M.D.  Rheumatology  [facility]

## 2024-02-16 ENCOUNTER — OFFICE VISIT (OUTPATIENT)
Dept: NEUROLOGY | Facility: CLINIC | Age: 24
End: 2024-02-16
Payer: COMMERCIAL

## 2024-02-16 VITALS
WEIGHT: 156.63 LBS | BODY MASS INDEX: 23.82 KG/M2 | DIASTOLIC BLOOD PRESSURE: 79 MMHG | SYSTOLIC BLOOD PRESSURE: 130 MMHG | HEART RATE: 66 BPM

## 2024-02-16 DIAGNOSIS — R79.89 TSH ELEVATION: Primary | ICD-10-CM

## 2024-02-16 DIAGNOSIS — R25.1 TREMOR: ICD-10-CM

## 2024-02-16 DIAGNOSIS — R25.1 TREMORS OF NERVOUS SYSTEM: ICD-10-CM

## 2024-02-16 PROCEDURE — 1159F MED LIST DOCD IN RCRD: CPT | Mod: CPTII,S$GLB,, | Performed by: PSYCHIATRY & NEUROLOGY

## 2024-02-16 PROCEDURE — 3008F BODY MASS INDEX DOCD: CPT | Mod: CPTII,S$GLB,, | Performed by: PSYCHIATRY & NEUROLOGY

## 2024-02-16 PROCEDURE — 99214 OFFICE O/P EST MOD 30 MIN: CPT | Mod: S$GLB,,, | Performed by: PSYCHIATRY & NEUROLOGY

## 2024-02-16 PROCEDURE — 99999 PR PBB SHADOW E&M-EST. PATIENT-LVL IV: CPT | Mod: PBBFAC,,, | Performed by: PSYCHIATRY & NEUROLOGY

## 2024-02-16 PROCEDURE — 3075F SYST BP GE 130 - 139MM HG: CPT | Mod: CPTII,S$GLB,, | Performed by: PSYCHIATRY & NEUROLOGY

## 2024-02-16 PROCEDURE — 3078F DIAST BP <80 MM HG: CPT | Mod: CPTII,S$GLB,, | Performed by: PSYCHIATRY & NEUROLOGY

## 2024-02-16 NOTE — PROGRESS NOTES
MOVEMENT DISORDERS CLINIC NEW CONSULT NOTE    PCP/Referring Provider: Nicol Shell MD  [address] [place] [address]  Date of Service: 2/16/2024    Chief Complaint: L hand tremor    HPI: Kiya Renae is a R HANDED 24 y.o. female with a medical issues significant for shingles, pt-reported lyme dz, chrohn's dz, psoriatic arthritis, coming for termor eval. Prior saw RR. She reports a L leg numbness and weakness since age 20. This was at first sporadic and next fixed. She feels deep pain otherwise no light touch in the entire leg distribution. At times he feels her limb is not connected to her body.  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 Tremor began sept 2023 after a colonoscopy. This tremor is well controlled with propranolol 80mg ER daily. Reduced in amplitude by 99%.  Also has larger amplitude jerking movements in her limbs.  Ongoing workup per RR involves characterizing her spells with an EMU visit. 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 She has not yet connected with functional restoration clinic.    MRI brain NL 2023    Currently on Slyrizi,   Zoloft 200mg   Propranolol 80mg ER  Flonconazole  Methocarbamol    Review of Systems:   Review of Systems   Constitutional:  Negative for fever.   HENT:  Negative for congestion.    Eyes:  Negative for double vision.   Respiratory:  Negative for cough and shortness of breath.    Cardiovascular:  Negative for chest pain and leg swelling.   Gastrointestinal:  Negative for nausea.   Genitourinary:  Negative for dysuria.   Musculoskeletal:  Positive for falls.   Skin:  Negative for rash.   Neurological:  Positive for tremors, sensory change, speech change, focal weakness, seizures and weakness. Negative for headaches.   Psychiatric/Behavioral:  Positive for depression.          Current Medications:  Outpatient Encounter Medications as of 2/16/2024   Medication Sig Dispense Refill     acetaminophen (TYLENOL) 500 MG tablet Take 1 tablet (500 mg total) by mouth every 6 (six) hours as needed for Pain. 20 tablet 0    bacitracin 500 unit/gram Oint Apply topically 2 (two) times daily. 30 g 0    clobetasoL (TEMOVATE) 0.05 % external solution Apply topically once daily. Use to affected areas for up to 2 weeks then take a 1 week break or decrease to 3 times weekly. Do not apply to groin or face 50 mL 2    dicyclomine (BENTYL) 20 mg tablet Take 1 tablet (20 mg total) by mouth every 8 (eight) hours as needed (abdominal cramping). 120 tablet 0    fluconazole (DIFLUCAN) 200 MG Tab Take 200 mg by mouth.      fluocinolone acetonide oiL 0.01 % Drop Place 1 application in ear(s) once daily. Use to affected areas for up to 2 weeks then take a 1 week break or decrease to 3 times weekly. For use in ears 20 mL 2    gabapentin (NEURONTIN) 100 MG capsule Take 100 mg by mouth daily as needed.      methocarbamoL (ROBAXIN) 500 MG Tab Take 1-2 tablets (500-1,000 mg total) by mouth 3 (three) times daily as needed (muscle tension). 60 tablet 4    pantoprazole (PROTONIX) 40 MG tablet Take 1 tablet (40 mg total) by mouth once daily. 30 tablet 3    propranoloL (INDERAL LA) 80 MG 24 hr capsule Take 1 capsule (80 mg total) by mouth once daily. 30 capsule 11    risankizumab-rzaa (SKYRIZI) 150 mg/mL PnIj       sertraline 200 mg Cap       topiramate (TOPAMAX) 25 MG tablet Take 1 tablet (25 mg total) by mouth once daily. 30 tablet 0    tretinoin (RETIN-A) 0.05 % cream Apply topically every evening. Start twice weekly and increase as tolerated. Pea sized amount to entire face. 20 g 3    [DISCONTINUED] methocarbamoL (ROBAXIN) 500 MG Tab Take 1 tablet (500 mg total) by mouth 2 (two) times daily as needed (muscle spasms). This medication can cause sedation. Try on a day you do not have to work the next day. Do not mix with alcohol or any other sedating meds (such as sleep aids, opioids, or benzos). Caution with driving or operating  heavy machinery. 30 tablet 0    [DISCONTINUED] sertraline (ZOLOFT) 50 MG tablet Take 50 mg by mouth.       No facility-administered encounter medications on file as of 2/16/2024.       Past Medical History:  Patient Active Problem List   Diagnosis    Plaque psoriasis    Migraine without aura and without status migrainosus, not intractable    Joint pain    Lyme arthritis    H/o Lyme disease    Nausea and vomiting    Family history of autoimmune disorder    Brain fog    Other long term (current) drug therapy    Rash and nonspecific skin eruption    Trigeminal neuralgia    Anxiety and depression    Diarrhea    Generalized abdominal pain    Psoriatic arthritis    Weight loss    Subclinical hypothyroidism    Other specified persistent mood disorders    Tremor    Muscle spasm    Neurological dysfunction    Gait abnormality       Past Surgical History:  Past Surgical History:   Procedure Laterality Date    COLONOSCOPY N/A 8/22/2023    Procedure: COLONOSCOPY;  Surgeon: Stanton Ferrell MD;  Location: [facility];  Service: Endoscopy;  Laterality: N/A;       Social:  Social History     Socioeconomic History    Marital status: Single   Tobacco Use    Smoking status: Never     Passive exposure: Never    Smokeless tobacco: Never   Substance and Sexual Activity    Alcohol use: Not Currently     Comment: only socially and a very small amount if ever (one or two )    Drug use: Never    Sexual activity: Never   Other Topics Concern    Are you pregnant or think you may be? No    Breast-feeding No     Social Determinants of Health     Financial Resource Strain: Low Risk  (11/10/2023)    Overall Financial Resource Strain (CARDIA)     Difficulty of Paying Living Expenses: Not very hard   Food Insecurity: No Food Insecurity (11/10/2023)    Hunger Vital Sign     Worried About Running Out of Food in the Last Year: Never true     Ran Out of Food in the Last Year: Never true   Transportation Needs: No Transportation Needs (11/10/2023)     PRAPARE - Transportation     Lack of Transportation (Medical): No     Lack of Transportation (Non-Medical): No   Physical Activity: Sufficiently Active (11/10/2023)    Exercise Vital Sign     Days of Exercise per Week: 6 days     Minutes of Exercise per Session: 60 min   Stress: Stress Concern Present (11/10/2023)    [employer] - Occupational Stress Questionnaire     Feeling of Stress : To some extent   Social Connections: Unknown (11/10/2023)    Social Connection and Isolation Panel [NHANES]     Frequency of Communication with Friends and Family: More than three times a week     Frequency of Social Gatherings with Friends and Family: More than three times a week     Active Member of Clubs or Organizations: Yes     Attends Club or Organization Meetings: More than 4 times per year     Marital Status: Never    Housing Stability: Unknown (11/10/2023)    Housing Stability Vital Sign     Unable to Pay for Housing in the Last Year: No     Unstable Housing in the Last Year: No       Family History:  Family History   Problem Relation Age of Onset    Alcohol abuse Mother     Asthma Mother     Rheum arthritis Mother     Psoriasis Mother     Alcohol abuse Father     Migraines Father     Acne Father     Depression Brother     Suicidality Brother     Eczema Brother     Diabetes Maternal Grandmother     Heart failure Maternal Grandfather     Breast cancer Paternal Grandmother     Heart attack Paternal Grandmother     Breast cancer Paternal Aunt     Lupus Paternal Aunt     Migraines Paternal Aunt        PHYSICAL:  /79 (BP Location: Right arm, Patient Position: Sitting, BP Method: Medium (Automatic))   Pulse 66   Wt 71.1 kg (156 lb 10.2 oz)   BMI 23.82 kg/m²     General Medical Examination:  General: Good hygiene, appropriate appearance.  HEENT: Normocephalic, atraumatic.   Neck: Supple.   Chest: Unlabored breathing.   CV: Symmetric pulses.   Ext: No clubbing, cyanosis, or edema.      Mental Status:  Mood/Affect: Appropriate/congruent.  Level of consciousness: Awake, alert.  Orientation: Oriented to person, place, time and situation.  Language: No Dysarthria    Cranial nerves:  I: Not tested  II: PERRL, VFF to counting  III, IV, VI: EOMI with conjugate gaze and no nystagmus on end gaze  V: Facial sensation intact and symmetric over the bilateral V1-V3  VII: Facial muscle activation intact and symmetric over the bilateral upper and lower face  VIII: Hearing intact in the b/l ears and symmetrical to finger rub  IX, X, XII: TUP midline - no atrophy or fasiculations  X: SCMs and shoulder shrug full strength b/l and symmetric    Motor:   -UE: 5/5 deltoids; 5/5 biceps, triceps; 5/5 wrist flexors, extensors; 5/5 interosseous; 5/5   -LEs: 5/5 hip flexion, extension; 5/5 knee flexion, extension; 5/5 ankle flexion, extension  L leg give-away weakness    DTRs:  ? Biceps Triceps Brachioradialis Knee Ankle   Left 2+   2+ 2+   Right 2+   2+ 2+       ? Finger taps Finger flicks ISAURO Heel taps   Left 0 0 0 0   Right 0 0 0 0     Neck tone: 0  ? Arm Leg   Left 0 0   Right 0 0     Sensation:   -Light touch: Intact and symmetric in the bilateral upper and lower extremities.    Coordination:   -Finger to nose: nl    Gait:  Atasia abasia    Tremor Exam   Arms extended Arms in wing position, fingers almost touching Re-emergent Arms extended wrists extended Intention Resting Kinetic   Left ?+ L thumb irregular myoclonic like jerking low amplitude ? ? ? ? ? ?   Right ? ? ? ? ? ? ?      Archimedes Spirals   Left ?nl   Right ?nl     Laboratory Data:  TSH considtently elevated    Imaging:  [facility] brain [facility]23[facility]    Assessment//Plan:   Problem List Items Addressed This Visit          Neuro    Tremor    Current Assessment & Plan     Intermittent tremor accompanied by multiple somatic issues. We discussed a multimodal approach reviewing physiological and nonphysiological workups/treatments.  Tremor is well  controlled with propranolol 80mg ER  No PDism dystonic type or ET type tremor noted  No medications known to cause tremors    We reviewed her MRI brain in detail which appear normal  Suggested and agree with ongoing workup by RR including possible EMU visit to characterize larger volume movements    F/.u endocrinology for persistently high TSH as this may cause a tremor          Other Visit Diagnoses       TSH elevation    -  Primary    Relevant Orders    Ambulatory referral/consult to Endocrinology    Tremors of nervous system                  Kasie Alfaro MD, MS  [facility]  Department of Neurology  Movement Disorders

## 2024-02-16 NOTE — ASSESSMENT & PLAN NOTE
Intermittent tremor accompanied by multiple somatic issues. We discussed a multimodal approach reviewing physiological and nonphysiological workups/treatments.  Tremor is well controlled with propranolol 80mg ER  No PDism dystonic type or ET type tremor noted  No medications known to cause tremors    We reviewed her MRI brain in detail which appear normal  Suggested and agree with ongoing workup by RR including possible EMU visit to characterize larger volume movements    F/.u endocrinology for persistently high TSH as this may cause a tremor

## 2024-03-06 ENCOUNTER — OFFICE VISIT (OUTPATIENT)
Dept: SPORTS MEDICINE | Facility: CLINIC | Age: 24
End: 2024-03-06
Payer: COMMERCIAL

## 2024-03-06 ENCOUNTER — HOSPITAL ENCOUNTER (OUTPATIENT)
Dept: RADIOLOGY | Facility: HOSPITAL | Age: 24
Discharge: HOME | End: 2024-03-06
Attending: NEUROMUSCULOSKELETAL MEDICINE & OMM
Payer: COMMERCIAL

## 2024-03-06 VITALS
SYSTOLIC BLOOD PRESSURE: 115 MMHG | WEIGHT: 156 LBS | HEIGHT: 68 IN | DIASTOLIC BLOOD PRESSURE: 75 MMHG | BODY MASS INDEX: 23.64 KG/M2

## 2024-03-06 DIAGNOSIS — M99.07 UPPER EXTREMITY SOMATIC DYSFUNCTION: ICD-10-CM

## 2024-03-06 DIAGNOSIS — M79.641 BILATERAL HAND PAIN: ICD-10-CM

## 2024-03-06 DIAGNOSIS — M25.532 BILATERAL WRIST PAIN: ICD-10-CM

## 2024-03-06 DIAGNOSIS — M79.642 BILATERAL HAND PAIN: ICD-10-CM

## 2024-03-06 DIAGNOSIS — M79.641 BILATERAL HAND PAIN: Primary | ICD-10-CM

## 2024-03-06 DIAGNOSIS — S69.81XA INJURY OF TRIANGULAR FIBROCARTILAGE COMPLEX (TFCC) OF RIGHT WRIST, INITIAL ENCOUNTER: Primary | ICD-10-CM

## 2024-03-06 DIAGNOSIS — M25.531 BILATERAL WRIST PAIN: ICD-10-CM

## 2024-03-06 DIAGNOSIS — L40.50 PSORIATIC ARTHRITIS: ICD-10-CM

## 2024-03-06 DIAGNOSIS — M79.642 BILATERAL HAND PAIN: Primary | ICD-10-CM

## 2024-03-06 DIAGNOSIS — L70.9 ACNE, UNSPECIFIED ACNE TYPE: ICD-10-CM

## 2024-03-06 DIAGNOSIS — K52.9 INFLAMMATORY BOWEL DISEASE: ICD-10-CM

## 2024-03-06 PROCEDURE — 99999 PR PBB SHADOW E&M-EST. PATIENT-LVL III: CPT | Mod: PBBFAC,,, | Performed by: NEUROMUSCULOSKELETAL MEDICINE & OMM

## 2024-03-06 PROCEDURE — 73130 X-RAY EXAM OF HAND: CPT | Mod: TC,50,PO

## 2024-03-06 PROCEDURE — 1159F MED LIST DOCD IN RCRD: CPT | Mod: CPTII,S$GLB,, | Performed by: NEUROMUSCULOSKELETAL MEDICINE & OMM

## 2024-03-06 PROCEDURE — 3078F DIAST BP <80 MM HG: CPT | Mod: CPTII,S$GLB,, | Performed by: NEUROMUSCULOSKELETAL MEDICINE & OMM

## 2024-03-06 PROCEDURE — 73130 X-RAY EXAM OF HAND: CPT | Mod: 26,50,, | Performed by: RADIOLOGY

## 2024-03-06 PROCEDURE — 1160F RVW MEDS BY RX/DR IN RCRD: CPT | Mod: CPTII,S$GLB,, | Performed by: NEUROMUSCULOSKELETAL MEDICINE & OMM

## 2024-03-06 PROCEDURE — 73100 X-RAY EXAM OF WRIST: CPT | Mod: TC,50,PO

## 2024-03-06 PROCEDURE — 98925 OSTEOPATH MANJ 1-2 REGIONS: CPT | Mod: S$GLB,,, | Performed by: NEUROMUSCULOSKELETAL MEDICINE & OMM

## 2024-03-06 PROCEDURE — 3074F SYST BP LT 130 MM HG: CPT | Mod: CPTII,S$GLB,, | Performed by: NEUROMUSCULOSKELETAL MEDICINE & OMM

## 2024-03-06 PROCEDURE — 73100 X-RAY EXAM OF WRIST: CPT | Mod: 26,50,, | Performed by: RADIOLOGY

## 2024-03-06 PROCEDURE — 99204 OFFICE O/P NEW MOD 45 MIN: CPT | Mod: 25,S$GLB,, | Performed by: NEUROMUSCULOSKELETAL MEDICINE & OMM

## 2024-03-06 PROCEDURE — 3008F BODY MASS INDEX DOCD: CPT | Mod: CPTII,S$GLB,, | Performed by: NEUROMUSCULOSKELETAL MEDICINE & OMM

## 2024-03-06 NOTE — PROGRESS NOTES
"Subjective:     Kiya Renae     Chief Complaint   Patient presents with    Right Wrist - Pain    Left Hand - Pain    Right Hand - Pain    Left Wrist - Pain     HPI    Kiya is a 24 y.o. female coming in today for bilateral hand/wrist pain that began 1 day(s) ago, referred by self. Pt. describes the pain as a 6/10 achy pain that does not radiate. There was a fall/injury/ or trauma associated with the onset of symptoms. Pt reports she was a  and has multiple traumas. Notes acute onset of bilateral lateral hand pain with "double combo" while boxing yesterday.The pain is better with rest and worse with activity, boxing. Hx psoriatic arthritis and chron's disease, followed my rheumatology, currently on Skyrizi, which had been managing her IBD and joint pain symptoms, until this current episode of pain with boxing. Pt. Denies any other musculoskeletal complaints at this time.     Joint instability? no  Mechanical locking/clicking? + popping wrists   Affecting ADL's? yes  Affecting sleep? yes    Occupation: [employer] student    Review of Systems   Constitutional:  Negative for chills and fever.   Musculoskeletal:  Positive for joint pain. Negative for back pain, falls, myalgias and neck pain.   Neurological:  Negative for dizziness, tingling, focal weakness, weakness and headaches.     PAST MEDICAL HISTORY:   Past Medical History:   Diagnosis Date    Allergy     Anxiety 07/31/2021    Arthritis 12/30/2021    PsA    Joint pain     Lyme disease, unspecified     Psoriasis     Skin disease 12/30/2021    psorias on scalp, behind ears, and inside ear canal    Subclinical hypothyroidism 07/05/2023     PAST SURGICAL HISTORY:   Past Surgical History:   Procedure Laterality Date    COLONOSCOPY N/A 8/22/2023    Procedure: COLONOSCOPY;  Surgeon: Stanton Ferrell MD;  Location: [facility];  Service: Endoscopy;  Laterality: N/A;     FAMILY HISTORY:   Family History   Problem Relation Age of Onset    Alcohol abuse " Mother     Asthma Mother     Rheum arthritis Mother     Psoriasis Mother     Alcohol abuse Father     Migraines Father     Acne Father     Depression Brother     Suicidality Brother     Eczema Brother     Diabetes Maternal Grandmother     Heart failure Maternal Grandfather     Breast cancer Paternal Grandmother     Heart attack Paternal Grandmother     Breast cancer Paternal Aunt     Lupus Paternal Aunt     Migraines Paternal Aunt      SOCIAL HISTORY:   Social History     Socioeconomic History    Marital status: Single   Tobacco Use    Smoking status: Never     Passive exposure: Never    Smokeless tobacco: Never   Substance and Sexual Activity    Alcohol use: Not Currently     Comment: only socially and a very small amount if ever (one or two )    Drug use: Never    Sexual activity: Never   Other Topics Concern    Are you pregnant or think you may be? No    Breast-feeding No     Social Determinants of Health     Financial Resource Strain: Low Risk  (11/10/2023)    Overall Financial Resource Strain (CARDIA)     Difficulty of Paying Living Expenses: Not very hard   Food Insecurity: No Food Insecurity (11/10/2023)    Hunger Vital Sign     Worried About Running Out of Food in the Last Year: Never true     Ran Out of Food in the Last Year: Never true   Transportation Needs: No Transportation Needs (11/10/2023)    PRAPARE - Transportation     Lack of Transportation (Medical): No     Lack of Transportation (Non-Medical): No   Physical Activity: Sufficiently Active (11/10/2023)    Exercise Vital Sign     Days of Exercise per Week: 6 days     Minutes of Exercise per Session: 60 min   Stress: Stress Concern Present (11/10/2023)    [employer] - Occupational Stress Questionnaire     Feeling of Stress : To some extent   Social Connections: Unknown (11/10/2023)    Social Connection and Isolation Panel [NHANES]     Frequency of Communication with Friends and Family: More than three times a week      Frequency of Social Gatherings with Friends and Family: More than three times a week     Active Member of Clubs or Organizations: Yes     Attends Club or Organization Meetings: More than 4 times per year     Marital Status: Never    Housing Stability: Unknown (11/10/2023)    Housing Stability Vital Sign     Unable to Pay for Housing in the Last Year: No     Unstable Housing in the Last Year: No     MEDICATIONS:   Current Outpatient Medications:     acetaminophen (TYLENOL) 500 MG tablet, Take 1 tablet (500 mg total) by mouth every 6 (six) hours as needed for Pain., Disp: 20 tablet, Rfl: 0    bacitracin 500 unit/gram Oint, Apply topically 2 (two) times daily., Disp: 30 g, Rfl: 0    clobetasoL (TEMOVATE) 0.05 % external solution, Apply topically once daily. Use to affected areas for up to 2 weeks then take a 1 week break or decrease to 3 times weekly. Do not apply to groin or face, Disp: 50 mL, Rfl: 2    dicyclomine (BENTYL) 20 mg tablet, Take 1 tablet (20 mg total) by mouth every 8 (eight) hours as needed (abdominal cramping)., Disp: 120 tablet, Rfl: 0    fluconazole (DIFLUCAN) 200 MG Tab, Take 200 mg by mouth., Disp: , Rfl:     fluocinolone acetonide oiL 0.01 % Drop, Place 1 application in ear(s) once daily. Use to affected areas for up to 2 weeks then take a 1 week break or decrease to 3 times weekly. For use in ears, Disp: 20 mL, Rfl: 2    gabapentin (NEURONTIN) 100 MG capsule, Take 100 mg by mouth daily as needed., Disp: , Rfl:     methocarbamoL (ROBAXIN) 500 MG Tab, Take 1-2 tablets (500-1,000 mg total) by mouth 3 (three) times daily as needed (muscle tension)., Disp: 60 tablet, Rfl: 4    pantoprazole (PROTONIX) 40 MG tablet, Take 1 tablet (40 mg total) by mouth once daily., Disp: 30 tablet, Rfl: 3    propranoloL (INDERAL LA) 80 MG 24 hr capsule, Take 1 capsule (80 mg total) by mouth once daily., Disp: 30 capsule, Rfl: 11    risankizumab-rzaa (SKYRIZI) 150 mg/mL PnPadmini, , Disp: , Rfl:     sertraline 200 mg  "Cap, , Disp: , Rfl:     topiramate (TOPAMAX) 25 MG tablet, Take 1 tablet (25 mg total) by mouth once daily., Disp: 30 tablet, Rfl: 0    tretinoin (RETIN-A) 0.05 % cream, Apply topically every evening. Start twice weekly and increase as tolerated. Pea sized amount to entire face., Disp: 20 g, Rfl: 3  ALLERGIES:   Review of patient's allergies indicates:   Allergen Reactions    Azithromycin Hives, Rash and Swelling     Reviewed office visit on 2/5/24 with Dr. Coombs:  1. Psoriatic arthritis  - stable  - continue skyrizi at the IBD dose per GI.   - if worsening, can consider methotrexate, Moy or IL-12/23. Would avoid TNF given neurological symptoms for now.   3. Crohn's disease without complication, unspecified gastrointestinal tract location  - appears to be stable on skyrizi  - had one episode of bloody stool last week and now has constipation.   - advised her to reach out to GI for further recommendations.   4. Tremor  - Unknown etiology. Workup per neurology unrevealing. No evidence of MS, which is patient's biggest concern. MELYSSA negative. Extensive antibody testing has been negative with exception of borderline anti histone which can be positive due to prior use of minocycline  - controlled on propanolol   - defer management to neurology     Objective:     VITAL SIGNS: /75   Ht 5' 8" (1.727 m)   Wt 70.8 kg (156 lb)   BMI 23.72 kg/m²    General    Nursing note and vitals reviewed.  Constitutional: She is oriented to person, place, and time. She appears well-developed and well-nourished.   HENT:   Head: Normocephalic and atraumatic.   no nasal discharge, no external ear redness or discharge   Eyes:   EOM is full and smooth  No eye redness or discharge   Neck: Neck supple. No tracheal deviation present.   Cardiovascular:  Normal rate.            2+ Radial pulse bilaterally  2+ Dorsalis Pedis pulse bilaterally  No LE edema appreciated   Pulmonary/Chest: Effort normal. No respiratory distress. "   Abdominal: She exhibits no distension.   No rigidity   Neurological: She is alert and oriented to person, place, and time. She exhibits normal muscle tone. Coordination normal.   See details below   Psychiatric: She has a normal mood and affect. Her behavior is normal.           MUSCULOSKELETAL EXAM  Wrist: bilateral WRIST  The affected wrist is compared to the contralateral wrist.    Observation:  No evidence of edema, erythema, ecchymosis, or effusion.  No asymmetries; muscle atrophy; ganglion cysts; or bony abnormalities including ulnar deviation,   No Heberdens or Brouchards nodes,   No swan-neck or boutonnière deformities.    No clubbing of the digits.    Tenderness:  + diffuse bilateral lateral wrist and hand joint pain  No tenderness at radial styloid, Marcelo's tubercle, ulnar styloid.  No tenderness at anatomic snuff box, scaphoid tubercle, scapholunate junction.  + tenderness at right TFCC  No tenderness at pisiform, hamate, metacarpals and phalanges.  No tenderness over median nerve at the carpal tunnel.    Range of Motion(* = with pain):  Active wrist extension to 70° on left and 70° on right.   Active wrist flexion to 80°on left and 80° on right.   Active radial deviation to 20° on left and 20° on right.    Active ulnar deviation to 30° on left and 30° on right*.   Active pronation to 80° on left and 70° on right*.   Active supination to 80° on left and 70° on right*.   Full active flexion and extension at the MCP, PIP, and DIP bilaterally.   Active thumb motion full in all planes.    Strength Testing (* = with pain):    Wrist extension - 5/5 on left and 5/5 on right  Wrist flexion - 5/5 on left and 5/5 on right  Ulnar deviation - 5/5 on left and 5/5 on right  Radial deviation - 5/5 on left and 5/5 on right  Pronation - 5/5 on left and 4+/5 on right*  Supination - 5/5 on left and 4+/5 on right*    Finger flexion - 5/5 on left and 5/5 on right  Finger extension - 5/5 on left and 5/5 on right  Finger  abduction - 5/5 on left and 5/5 on right  Finger adduction - 5/5 on left and 5/5 on right    Thumb flexion - 5/5 on left and 5/5 on right  Thumb extension - 5/5 on left and 5/5 on right  Thumb abduction - 5/5 on left and 5/5 on right  Thumb adduction - 5/5 on left and 5/5 on right  Thumb opposition - 5/5 on left and 5/5 on right    Special Tests:  Phalen's test - negative  Tinel's test at wrist - negative  Carpal compression test - negative    Ulnar compression test - positive  Tinel's test of Guyon's canal - negative    No laxity with distal radioulnar joint translation.  Negative Daviss test.     No laxity with phalangeal varus/valgus stress testing.    Finkelsteins test - negative    Axial grind of first CMC joint - negative  No laxity at the MCP UCL of the thumb    Normal fist alignment of the phalanges  No laxity at the RCL and UCL of the PIPs and DIPs of the phalanges.  Normal finger flexion of the FDP and FDS in all phalanges bilaterally.  Able to perform OK sign.    Neurovascular Exam:  Sensation intact to light touch in the median, ulnar, and radial distributions on the hands bilaterally.  Radial and ulnar pulses intact and symmetric.  Capillary refill intact to <2 seconds in all digits.      TART (Tissue texture abnormality, Asymmetry,  Restriction of motion and/or Tenderness) changes:    Upper extremity:    Region Finding/resrtiction   Radial head Posterior on Right   Ulna ADDucted on right   Distal Radiocapral Neutral   DRUJ Right   TFCC Right   Proximal carpal row Right   Distal carpal row Neutral   1st, 2nd, 3rd, 4th, and 5th Carpal-metacarpal  Neutral   1st, 2nd, 3rd, 4th, and 5th Metacarapal  Neutral       Key   F= Flexed   E = Extended   R = Rotated   S = Sidebent   TTA = tissue texture abnormality     IMAGIN. X-ray ordered due to bilateral hand/wrist pain. (AP, lateral, and oblique views) taken today.   2. X-ray images were reviewed personally by me and then directly with patient.  3.  FINDINGS: X-ray images obtained demonstrate no acute fractures. Preserved bone density. Preserved joint spaces. No opaque soft tissue foreign bodies or areas of obvious soft tissue swelling.   4. IMPRESSION: No pathology or irregularities appreciated.     Assessment:      Encounter Diagnoses   Name Primary?    Injury of triangular fibrocartilage complex (TFCC) of right wrist, initial encounter Yes    Psoriatic arthritis     Inflammatory bowel disease     Bilateral wrist pain     Bilateral hand pain     Upper extremity somatic dysfunction         Plan:   1. Bilateral hand and wrist pain likely secondary to flare of underlying psoriatic arthritis.  Patient also has a history of Crohn's disease, currently controlled on Skyrizi.  However, more focal right wrist pain seems to be stemming from irritation of the right TFCC.  - OMT performed today to address associated biomechanical restrictions   - recommend rest from boxing and wrist impact exercises for the next 3 weeks  - recommend over-the-counter Voltaren gel to 4 times a day as needed for pain control as well as ice to 20 minutes at a time  - recommend use of over-the-counter wrist widget brace daily for the next 2 weeks, then as needed, if symptoms improve  - discussed option of Medrol Dosepak as next step, if symptoms persist  -  X-ray images of bilateral hand/wrist taken today (AP, lateral, and oblique views) showed no significant abnormalities. Images were personally reviewed with patient.    2. OMT 1-2 regions. Oral consent obtained.  Reviewed benefits and potential side effects.   - OMT indicated today due to signs and symptoms as well as local and remote somatic dysfunction findings and their related neurokinetic, lymphatic, fascial and/or arteriovenous body connections.   - OMT techniques used: Muscle Energy and Articulatory   - Treatment was tolerated well. Improvement noted in segmental mobility post-treatment in dysfunctional regions. There were no adverse  events and no complications immediately following treatment.     3. Follow-up in 3 weeks for reevaluation    4. Patient agreeable to today's plan and all questions were answered    This note is dictated using the M*Modal Fluency Direct word recognition program. There are word recognition mistakes that are occasionally missed on review.

## 2024-03-11 RX ORDER — TRETINOIN 0.5 MG/G
CREAM TOPICAL NIGHTLY
Qty: 20 G | Refills: 3 | Status: SHIPPED | OUTPATIENT
Start: 2024-03-11

## 2024-03-11 NOTE — TELEPHONE ENCOUNTER
Assessment / Plan:         Acne--Chronic condition, not at goal  - Papules and pustules with some deeper hormonal lesions on face. Mild on back  - For face--BPO 4% cleanser, dapsone 7.5 qam and tretinoin qpm. Counseled on use of retinoids and handout provided  - for back, BPo 10% wash and gly/sal pads or spray  - discussed other treatment options including spironolactone and doxy. Patient would like to use only topicals for now     -     tretinoin (RETIN-A) 0.05 % cream; Apply topically every evening. Start twice weekly and increase as tolerated. Pea sized amount to entire face.  Dispense: 20 g; Refill: 3  -     dapsone (ACZONE) 7.5 % GlwP; Apply 1 application topically once daily.  Dispense: 60 g; Refill: 3     Plaque psoriasis--only scalp involvement + PsA. Exam clear today but patient reports itching  - on skyrizi prescribed by rheum--continue  - scalp and ears flare prior to timing for dose. Topicals as below  -     clobetasoL (TEMOVATE) 0.05 % external solution; Apply topically once daily. Use to affected areas for up to 2 weeks then take a 1 week break or decrease to 3 times weekly. Do not apply to groin or face  Dispense: 50 mL; Refill: 2  -     fluocinolone acetonide oiL 0.01 % Drop; Place 1 application in ear(s) once daily. Use to affected areas for up to 2 weeks then take a 1 week break or decrease to 3 times weekly. For use in ears  Dispense: 20 mL; Refill: 2  -     pimecrolimus (ELIDEL) 1 % cream; Apply topically 2 (two) times daily. Use for psoriasis on ears, face, neck. Not a steroid  Dispense: 60 g; Refill: 3        Follow up in about 2 months (around 6/26/2023).         Electronically signed by Mark Myrick MD at 4/26/2023  4:18 PM

## 2024-03-27 NOTE — PROGRESS NOTES
"Subjective:     Kiya Renae     Chief Complaint   Patient presents with    Left Wrist - Follow-up    Right Wrist - Follow-up     HPI    Kiya is a 24 y.o. female coming in today for bilateral wrist pain. Since last visit the pain has Improved.  Pt is compliant with HEP. She has tried Voltaren gel without relief. She reports improvement in her pain levels and ROM, with less "cracking" in her wrists. She did not get the wrist widget. The pain is better with rest and worse with activity, boxing. Pt. describes the pain as a 4/10 achy pain that does not radiate. There has not been any new a fall/injury/ or traumas since last visit.  Pt. denies any new musculoskeletal complaints at this time.     Office note from 3/6/24 reviewed    Joint instability? no  Mechanical locking/clicking? + popping wrists   Affecting ADL's? yes  Affecting sleep? yes    Occupation: [employer] student    PAST MEDICAL HISTORY:   Past Medical History:   Diagnosis Date    Allergy     Anxiety 07/31/2021    Arthritis 12/30/2021    PsA    Joint pain     Lyme disease, unspecified     Psoriasis     Skin disease 12/30/2021    psorias on scalp, behind ears, and inside ear canal    Subclinical hypothyroidism 07/05/2023     PAST SURGICAL HISTORY:   Past Surgical History:   Procedure Laterality Date    COLONOSCOPY N/A 8/22/2023    Procedure: COLONOSCOPY;  Surgeon: Stanton Ferrell MD;  Location: [facility];  Service: Endoscopy;  Laterality: N/A;     FAMILY HISTORY:   Family History   Problem Relation Age of Onset    Alcohol abuse Mother     Asthma Mother     Rheum arthritis Mother     Psoriasis Mother     Alcohol abuse Father     Migraines Father     Acne Father     Depression Brother     Suicidality Brother     Eczema Brother     Diabetes Maternal Grandmother     Heart failure Maternal Grandfather     Breast cancer Paternal Grandmother     Heart attack Paternal Grandmother     Breast cancer Paternal Aunt     Lupus Paternal Aunt     Migraines Paternal " Aunt      SOCIAL HISTORY:   Social History     Socioeconomic History    Marital status: Single   Tobacco Use    Smoking status: Never     Passive exposure: Never    Smokeless tobacco: Never   Substance and Sexual Activity    Alcohol use: Not Currently     Comment: only socially and a very small amount if ever (one or two )    Drug use: Never    Sexual activity: Never   Other Topics Concern    Are you pregnant or think you may be? No    Breast-feeding No     Social Determinants of Health     Financial Resource Strain: Low Risk  (11/10/2023)    Overall Financial Resource Strain (CARDIA)     Difficulty of Paying Living Expenses: Not very hard   Food Insecurity: No Food Insecurity (11/10/2023)    Hunger Vital Sign     Worried About Running Out of Food in the Last Year: Never true     Ran Out of Food in the Last Year: Never true   Transportation Needs: No Transportation Needs (11/10/2023)    PRAPARE - Transportation     Lack of Transportation (Medical): No     Lack of Transportation (Non-Medical): No   Physical Activity: Sufficiently Active (11/10/2023)    Exercise Vital Sign     Days of Exercise per Week: 6 days     Minutes of Exercise per Session: 60 min   Stress: Stress Concern Present (11/10/2023)    [employer] [place] [employer] - Occupational Stress Questionnaire     Feeling of Stress : To some extent   Social Connections: Unknown (11/10/2023)    Social Connection and Isolation Panel [NHANES]     Frequency of Communication with Friends and Family: More than three times a week     Frequency of Social Gatherings with Friends and Family: More than three times a week     Active Member of Clubs or Organizations: Yes     Attends Club or Organization Meetings: More than 4 times per year     Marital Status: Never    Housing Stability: Unknown (11/10/2023)    Housing Stability Vital Sign     Unable to Pay for Housing in the Last Year: No     Unstable Housing in the Last Year: No     MEDICATIONS:   Current  "Outpatient Medications:     acetaminophen (TYLENOL) 500 MG tablet, Take 1 tablet (500 mg total) by mouth every 6 (six) hours as needed for Pain., Disp: 20 tablet, Rfl: 0    bacitracin 500 unit/gram Oint, Apply topically 2 (two) times daily., Disp: 30 g, Rfl: 0    clobetasoL (TEMOVATE) 0.05 % external solution, Apply topically once daily. Use to affected areas for up to 2 weeks then take a 1 week break or decrease to 3 times weekly. Do not apply to groin or face, Disp: 50 mL, Rfl: 2    dicyclomine (BENTYL) 20 mg tablet, Take 1 tablet (20 mg total) by mouth every 8 (eight) hours as needed (abdominal cramping)., Disp: 120 tablet, Rfl: 0    fluconazole (DIFLUCAN) 200 MG Tab, Take 200 mg by mouth., Disp: , Rfl:     fluocinolone acetonide oiL 0.01 % Drop, Place 1 application in ear(s) once daily. Use to affected areas for up to 2 weeks then take a 1 week break or decrease to 3 times weekly. For use in ears, Disp: 20 mL, Rfl: 2    gabapentin (NEURONTIN) 100 MG capsule, Take 100 mg by mouth daily as needed., Disp: , Rfl:     pantoprazole (PROTONIX) 40 MG tablet, Take 1 tablet (40 mg total) by mouth once daily., Disp: 30 tablet, Rfl: 3    propranoloL (INDERAL LA) 80 MG 24 hr capsule, Take 1 capsule (80 mg total) by mouth once daily., Disp: 30 capsule, Rfl: 11    risankizumab-rzaa (SKYRIZI) 150 mg/mL PnPadmini, , Disp: , Rfl:     sertraline 200 mg Cap, , Disp: , Rfl:     topiramate (TOPAMAX) 25 MG tablet, Take 1 tablet (25 mg total) by mouth once daily., Disp: 30 tablet, Rfl: 0    tretinoin (RETIN-A) 0.05 % cream, Apply topically every evening. Start twice weekly and increase as tolerated. Pea sized amount to entire face., Disp: 20 g, Rfl: 3  ALLERGIES:   Review of patient's allergies indicates:   Allergen Reactions    Azithromycin Hives, Rash and Swelling     Objective:     VITAL SIGNS: /80   Pulse 61   Ht 5' 8" (1.727 m)   Wt 70.3 kg (155 lb)   BMI 23.57 kg/m²    General    Vitals reviewed.  Constitutional: She is " oriented to person, place, and time. She appears well-developed and well-nourished.   Neurological: She is alert and oriented to person, place, and time.   Psychiatric: She has a normal mood and affect. Her behavior is normal.           MUSCULOSKELETAL EXAM  Wrist: bilateral WRIST  The affected wrist is compared to the contralateral wrist.    Observation:  No evidence of edema, erythema, ecchymosis, or effusion.  No asymmetries; muscle atrophy; ganglion cysts; or bony abnormalities including ulnar deviation,   No Heberdens or Brouchards nodes,   No swan-neck or boutonnière deformities.    No clubbing of the digits.    Tenderness:  Decreased diffuse bilateral lateral wrist and hand joint pain  No tenderness at radial styloid, Marcelo's tubercle, ulnar styloid.  No tenderness at anatomic snuff box, scaphoid tubercle, scapholunate junction.  + tenderness at right TFCC  No tenderness at pisiform, hamate, metacarpals and phalanges.  No tenderness over median nerve at the carpal tunnel.    Range of Motion(* = with pain):  Active wrist extension to 70° on left and 70° on right.   Active wrist flexion to 80°on left and 80° on right.   Active radial deviation to 20° on left and 20° on right.    Active ulnar deviation to 30° on left and 30° on right*.   Active pronation to 80° on left and 80° on right.   Active supination to 80° on left and 80° on right.   Full active flexion and extension at the MCP, PIP, and DIP bilaterally.   Active thumb motion full in all planes.    Strength Testing (* = with pain):    Wrist extension - 5/5 on left and 5/5 on right  Wrist flexion - 5/5 on left and 5/5 on right  Ulnar deviation - 5/5 on left and 5/5 on right  Radial deviation - 5/5 on left and 5/5 on right  Pronation - 5/5 on left and 5/5 on right  Supination - 5/5 on left and 5/5 on right    Finger flexion - 5/5 on left and 5/5 on right  Finger extension - 5/5 on left and 5/5 on right  Finger abduction - 5/5 on left and 5/5 on  right  Finger adduction - 5/5 on left and 5/5 on right    Thumb flexion - 5/5 on left and 5/5 on right  Thumb extension - 5/5 on left and 5/5 on right  Thumb abduction - 5/5 on left and 5/5 on right  Thumb adduction - 5/5 on left and 5/5 on right  Thumb opposition - 5/5 on left and 5/5 on right    Special Tests:  Phalen's test - negative  Tinel's test at wrist - negative  Carpal compression test - negative    Ulnar compression test - negative  Tinel's test of Guyon's canal - negative    No laxity with distal radioulnar joint translation.  Negative Daviss test.     No laxity with phalangeal varus/valgus stress testing.    Finkelsteins test - negative    Axial grind of first CMC joint - negative  No laxity at the MCP UCL of the thumb    Normal fist alignment of the phalanges  No laxity at the RCL and UCL of the PIPs and DIPs of the phalanges.  Normal finger flexion of the FDP and FDS in all phalanges bilaterally.  Able to perform OK sign.    Neurovascular Exam:  Sensation intact to light touch in the median, ulnar, and radial distributions on the hands bilaterally.  Radial and ulnar pulses intact and symmetric.  Capillary refill intact to <2 seconds in all digits.      TART (Tissue texture abnormality, Asymmetry,  Restriction of motion and/or Tenderness) changes:    Upper extremity:    Region Finding/resrtiction   Radial head Neutral   Ulna Neutral   Distal Radiocapral Neutral   DRUJ Neutral   TFCC Right, right continual distortion (CD) fascial distortion    Proximal carpal row Right   Distal carpal row Neutral   1st, 2nd, 3rd, 4th, and 5th Carpal-metacarpal  Neutral   1st, 2nd, 3rd, 4th, and 5th Metacarapal  Neutral   Left dorsal hand Trigger band (TB) fascial distortion      Key   F= Flexed   E = Extended   R = Rotated   S = Sidebent   TTA = tissue texture abnormality     Assessment:      Encounter Diagnoses   Name Primary?    Bilateral wrist pain Yes    Injury of triangular fibrocartilage complex (TFCC) of right  wrist, subsequent encounter     Myalgia     Upper extremity somatic dysfunction         Plan:   1. Bilateral hand and wrist pain likely secondary to flare of underlying psoriatic arthritis- improved.  Patient also has a history of Crohn's disease, currently controlled on Skyrizi.  More focal right wrist pain from irritation of the right TFCC ha improved as well, but still having pain with wrist loading activity.  - OMT performed again today to address associated biomechanical restrictions   - recommend rest from wrist impact exercises for another 2 weeks, then progress back as tolerated using writ widget brace  - recommend over-the-counter Voltaren gel to 4 times a day as needed for pain control as well as ice to 20 minutes at a time  - recommend use of over-the-counter wrist widget brace daily for the next 2 weeks, then just with increased activity  - discussed option of OT as next step, if symptoms persist  -  X-ray images of bilateral hand/wrist taken 3/6/24 (AP, lateral, and oblique views) showed no significant abnormalities. Images were personally reviewed with patient.    2. OMT 1-2 regions. Oral consent obtained.  Reviewed benefits and potential side effects, including bruising at site of treatment and increased soreness for the next 24-48 hours. Pt. Instructed to increased water intake by 1 L today and tomorrow to help with any residual soreness.   - OMT indicated today due to signs and symptoms as well as local and remote somatic dysfunction findings and their related neurokinetic, lymphatic, fascial and/or arteriovenous body connections.   - OMT techniques used: Fascial Distortion Model and Articulatory   - Treatment was tolerated well. Improvement noted in segmental mobility post-treatment in dysfunctional regions. There were no adverse events and no complications immediately following treatment.     3. Follow-up as needed if pain deteriorates or new issue arises    4. Patient agreeable to today's plan  and all questions were answered    This note is dictated using the [employer] Fluency Direct word recognition program. There are word recognition mistakes that are occasionally missed on review.

## 2024-03-28 ENCOUNTER — OFFICE VISIT (OUTPATIENT)
Dept: SPORTS MEDICINE | Facility: CLINIC | Age: 24
End: 2024-03-28
Payer: COMMERCIAL

## 2024-03-28 VITALS
WEIGHT: 155 LBS | BODY MASS INDEX: 23.49 KG/M2 | HEART RATE: 61 BPM | HEIGHT: 68 IN | DIASTOLIC BLOOD PRESSURE: 80 MMHG | SYSTOLIC BLOOD PRESSURE: 115 MMHG

## 2024-03-28 DIAGNOSIS — S69.81XD INJURY OF TRIANGULAR FIBROCARTILAGE COMPLEX (TFCC) OF RIGHT WRIST, SUBSEQUENT ENCOUNTER: ICD-10-CM

## 2024-03-28 DIAGNOSIS — M79.10 MYALGIA: ICD-10-CM

## 2024-03-28 DIAGNOSIS — M99.07 UPPER EXTREMITY SOMATIC DYSFUNCTION: ICD-10-CM

## 2024-03-28 DIAGNOSIS — M25.531 BILATERAL WRIST PAIN: Primary | ICD-10-CM

## 2024-03-28 DIAGNOSIS — M25.532 BILATERAL WRIST PAIN: Primary | ICD-10-CM

## 2024-03-28 PROCEDURE — 1159F MED LIST DOCD IN RCRD: CPT | Mod: CPTII,S$GLB,, | Performed by: NEUROMUSCULOSKELETAL MEDICINE & OMM

## 2024-03-28 PROCEDURE — 99999 PR PBB SHADOW E&M-EST. PATIENT-LVL IV: CPT | Mod: PBBFAC,,, | Performed by: NEUROMUSCULOSKELETAL MEDICINE & OMM

## 2024-03-28 PROCEDURE — 3079F DIAST BP 80-89 MM HG: CPT | Mod: CPTII,S$GLB,, | Performed by: NEUROMUSCULOSKELETAL MEDICINE & OMM

## 2024-03-28 PROCEDURE — 3008F BODY MASS INDEX DOCD: CPT | Mod: CPTII,S$GLB,, | Performed by: NEUROMUSCULOSKELETAL MEDICINE & OMM

## 2024-03-28 PROCEDURE — 3074F SYST BP LT 130 MM HG: CPT | Mod: CPTII,S$GLB,, | Performed by: NEUROMUSCULOSKELETAL MEDICINE & OMM

## 2024-03-28 PROCEDURE — 1160F RVW MEDS BY RX/DR IN RCRD: CPT | Mod: CPTII,S$GLB,, | Performed by: NEUROMUSCULOSKELETAL MEDICINE & OMM

## 2024-03-28 PROCEDURE — 98925 OSTEOPATH MANJ 1-2 REGIONS: CPT | Mod: S$GLB,,, | Performed by: NEUROMUSCULOSKELETAL MEDICINE & OMM

## 2024-03-28 PROCEDURE — 99213 OFFICE O/P EST LOW 20 MIN: CPT | Mod: 25,S$GLB,, | Performed by: NEUROMUSCULOSKELETAL MEDICINE & OMM

## 2024-04-16 ENCOUNTER — OFFICE VISIT (OUTPATIENT)
Dept: PRIMARY CARE CLINIC | Facility: CLINIC | Age: 24
End: 2024-04-16
Payer: COMMERCIAL

## 2024-04-16 VITALS
BODY MASS INDEX: 24.46 KG/M2 | DIASTOLIC BLOOD PRESSURE: 70 MMHG | WEIGHT: 161.38 LBS | HEART RATE: 64 BPM | SYSTOLIC BLOOD PRESSURE: 118 MMHG | OXYGEN SATURATION: 100 % | HEIGHT: 68 IN

## 2024-04-16 DIAGNOSIS — Z83.2 FAMILY HISTORY OF AUTOIMMUNE DISORDER: ICD-10-CM

## 2024-04-16 DIAGNOSIS — R29.90 NEUROLOGICAL DYSFUNCTION: ICD-10-CM

## 2024-04-16 DIAGNOSIS — R41.89 BRAIN FOG: ICD-10-CM

## 2024-04-16 DIAGNOSIS — G43.009 MIGRAINE WITHOUT AURA AND WITHOUT STATUS MIGRAINOSUS, NOT INTRACTABLE: Chronic | ICD-10-CM

## 2024-04-16 DIAGNOSIS — L81.9 HYPOPIGMENTATION: ICD-10-CM

## 2024-04-16 DIAGNOSIS — Z86.19 H/O LYME DISEASE: ICD-10-CM

## 2024-04-16 DIAGNOSIS — L40.0 PLAQUE PSORIASIS: ICD-10-CM

## 2024-04-16 DIAGNOSIS — K50.911 CROHN'S DISEASE WITH RECTAL BLEEDING, UNSPECIFIED GASTROINTESTINAL TRACT LOCATION: ICD-10-CM

## 2024-04-16 DIAGNOSIS — R25.1 TREMOR: Primary | ICD-10-CM

## 2024-04-16 PROCEDURE — 3074F SYST BP LT 130 MM HG: CPT | Mod: CPTII,S$GLB,, | Performed by: STUDENT IN AN ORGANIZED HEALTH CARE EDUCATION/TRAINING PROGRAM

## 2024-04-16 PROCEDURE — 3008F BODY MASS INDEX DOCD: CPT | Mod: CPTII,S$GLB,, | Performed by: STUDENT IN AN ORGANIZED HEALTH CARE EDUCATION/TRAINING PROGRAM

## 2024-04-16 PROCEDURE — 99999 PR PBB SHADOW E&M-EST. PATIENT-LVL IV: CPT | Mod: PBBFAC,,, | Performed by: STUDENT IN AN ORGANIZED HEALTH CARE EDUCATION/TRAINING PROGRAM

## 2024-04-16 PROCEDURE — 1159F MED LIST DOCD IN RCRD: CPT | Mod: CPTII,S$GLB,, | Performed by: STUDENT IN AN ORGANIZED HEALTH CARE EDUCATION/TRAINING PROGRAM

## 2024-04-16 PROCEDURE — 3078F DIAST BP <80 MM HG: CPT | Mod: CPTII,S$GLB,, | Performed by: STUDENT IN AN ORGANIZED HEALTH CARE EDUCATION/TRAINING PROGRAM

## 2024-04-16 PROCEDURE — 1160F RVW MEDS BY RX/DR IN RCRD: CPT | Mod: CPTII,S$GLB,, | Performed by: STUDENT IN AN ORGANIZED HEALTH CARE EDUCATION/TRAINING PROGRAM

## 2024-04-16 PROCEDURE — 99214 OFFICE O/P EST MOD 30 MIN: CPT | Mod: S$GLB,,, | Performed by: STUDENT IN AN ORGANIZED HEALTH CARE EDUCATION/TRAINING PROGRAM

## 2024-04-16 RX ORDER — PROPRANOLOL HYDROCHLORIDE 80 MG/1
80 CAPSULE, EXTENDED RELEASE ORAL DAILY
Qty: 90 CAPSULE | Refills: 3 | Status: SHIPPED | OUTPATIENT
Start: 2024-04-16

## 2024-04-16 NOTE — PROGRESS NOTES
Kiya Renae  2000        Subjective     Chief Complaint: F/u    History of Present Illness:  Ms. Kiya Renae is a 24 y.o. female who presents to clinic for f/u.  Has hx of IBD, Psoriasis, Lyme, Migraines, Tremors.    Has seen multiple specialists. Saw Neuro, Dr. Alfaro. Seems to be doing well with Propranolol 80 mg ER.   Also seeing MILLER Morales. Plan for possible EEG per pt.   Rheum-Dr. Coombs   Ortho-Shameka Velasquez NP.  Has appt with Endo coming up in July. Intermittent elevations of TSH.    Per notes, imaging without acute abnormalities.    Migraines- seem to be doing well. Sometimes worse when stressed with work. Better than before.    IBD- still on Skyrizi. Following mainly with GI in [place]. Has new GI appt here coming up on 4/19. Sometimes feels she flares towards endo of dose and with menstrual cycles. Some blood and cramps. Not bright red, dried.     Has ENT coming up tomorrow. Sometimes has itching in ears.    Sometimes has butterfly rash still.     Did paps back in [place] last summer.    Review of Systems   Constitutional:  Positive for malaise/fatigue. Negative for chills and fever.   Cardiovascular:  Negative for leg swelling.   Gastrointestinal:  Positive for abdominal pain and blood in stool.   Skin:  Negative for rash.   Neurological:  Positive for tremors, sensory change and headaches (better than before).   Psychiatric/Behavioral:  The patient is not nervous/anxious.         PAST HISTORY:     Past Medical History:   Diagnosis Date    Allergy     Anxiety 07/31/2021    Arthritis 12/30/2021    PsA    Joint pain     Lyme disease, unspecified     Psoriasis     Skin disease 12/30/2021    psorias on scalp, behind ears, and inside ear canal    Subclinical hypothyroidism 07/05/2023       Past Surgical History:   Procedure Laterality Date    COLONOSCOPY N/A 8/22/2023    Procedure: COLONOSCOPY;  Surgeon: Stanton Ferrell MD;  Location: [facility];  Service: Endoscopy;  Laterality:  N/A;       Family History   Problem Relation Name Age of Onset    Alcohol abuse Mother Olga Renae     Asthma Mother Olga Renae     Rheum arthritis Mother Olga Renae     Psoriasis Mother Olga Renae     Alcohol abuse Father Adrián Renae Jr     Migraines Father Adrián Renae Jr     Acne Father Adrián Renae Jr     Depression Brother Omari Renae     Suicidality Brother Omari Renae     Eczema Brother Omari Renae     Diabetes Maternal Grandmother      Heart failure Maternal Grandfather      Breast cancer Paternal Grandmother      Heart attack Paternal Grandmother      Breast cancer Paternal Aunt Jennifer Cranor     Lupus Paternal Aunt Jennifer Cranor     Migraines Paternal Aunt Jennifer Cranor        Social History     Socioeconomic History    Marital status: Single   Tobacco Use    Smoking status: Never     Passive exposure: Never    Smokeless tobacco: Never   Substance and Sexual Activity    Alcohol use: Not Currently     Comment: only socially and a very small amount if ever (one or two )    Drug use: Never    Sexual activity: Never   Other Topics Concern    Are you pregnant or think you may be? No    Breast-feeding No     Social Determinants of Health     Financial Resource Strain: Low Risk  (11/10/2023)    Overall Financial Resource Strain (CARDIA)     Difficulty of Paying Living Expenses: Not very hard   Food Insecurity: No Food Insecurity (11/10/2023)    Hunger Vital Sign     Worried About Running Out of Food in the Last Year: Never true     Ran Out of Food in the Last Year: Never true   Transportation Needs: No Transportation Needs (11/10/2023)    PRAPARE - Transportation     Lack of Transportation (Medical): No     Lack of Transportation (Non-Medical): No   Physical Activity: Sufficiently Active (11/10/2023)    Exercise Vital Sign     Days of Exercise per Week: 6 days     Minutes of Exercise per Session: 60 min   Stress: Stress Concern Present (11/10/2023)    [employer] - Occupational Stress  Questionnaire     Feeling of Stress : To some extent   Social Connections: Unknown (11/10/2023)    Social Connection and Isolation Panel [NHANES]     Frequency of Communication with Friends and Family: More than three times a week     Frequency of Social Gatherings with Friends and Family: More than three times a week     Active Member of Clubs or Organizations: Yes     Attends Club or Organization Meetings: More than 4 times per year     Marital Status: Never    Housing Stability: Unknown (11/10/2023)    Housing Stability Vital Sign     Unable to Pay for Housing in the Last Year: No     Unstable Housing in the Last Year: No       MEDICATIONS & ALLERGIES:     Current Outpatient Medications   Medication Sig Dispense Refill    acetaminophen (TYLENOL) 500 MG tablet Take 1 tablet (500 mg total) by mouth every 6 (six) hours as needed for Pain. 20 tablet 0    bacitracin 500 unit/gram Oint Apply topically 2 (two) times daily. 30 g 0    clobetasoL (TEMOVATE) 0.05 % external solution Apply topically once daily. Use to affected areas for up to 2 weeks then take a 1 week break or decrease to 3 times weekly. Do not apply to groin or face 50 mL 2    dicyclomine (BENTYL) 20 mg tablet Take 1 tablet (20 mg total) by mouth every 8 (eight) hours as needed (abdominal cramping). 120 tablet 0    fluconazole (DIFLUCAN) 200 MG Tab Take 200 mg by mouth.      fluocinolone acetonide oiL 0.01 % Drop Place 1 application in ear(s) once daily. Use to affected areas for up to 2 weeks then take a 1 week break or decrease to 3 times weekly. For use in ears 20 mL 2    gabapentin (NEURONTIN) 100 MG capsule Take 100 mg by mouth daily as needed.      pantoprazole (PROTONIX) 40 MG tablet Take 1 tablet (40 mg total) by mouth once daily. 30 tablet 3    risankizumab-rzaa (SKYRIZI) 150 mg/mL PnIj       sertraline 200 mg Cap       topiramate (TOPAMAX) 25 MG tablet Take 1 tablet (25 mg total) by mouth once daily. 30 tablet 0    tretinoin (RETIN-A) 0.05  "% cream Apply topically every evening. Start twice weekly and increase as tolerated. Pea sized amount to entire face. 20 g 3    propranoloL (INDERAL LA) 80 MG 24 hr capsule Take 1 capsule (80 mg total) by mouth once daily. 90 capsule 3     No current facility-administered medications for this visit.       Review of patient's allergies indicates:   Allergen Reactions    Azithromycin Hives, Rash and Swelling       OBJECTIVE:     Vital Signs:  Vitals:    04/16/24 0953   BP: 118/70   BP Location: Left arm   Patient Position: Sitting   BP Method: Medium (Manual)   Pulse: 64   SpO2: 100%   Weight: 73.2 kg (161 lb 6 oz)   Height: 5' 8" (1.727 m)       Body mass index is 24.54 kg/m².     Physical Exam:  Physical Exam  Vitals and nursing note reviewed.   Constitutional:       General: She is not in acute distress.     Appearance: Normal appearance. She is not ill-appearing, toxic-appearing or diaphoretic.   HENT:      Head: Normocephalic and atraumatic.   Eyes:      General: No scleral icterus.     Conjunctiva/sclera: Conjunctivae normal.   Pulmonary:      Effort: Pulmonary effort is normal. No respiratory distress.   Musculoskeletal:         General: Normal range of motion.   Skin:     Comments: Small patchy areas of hypopigmentation on bilateral forearms.   Neurological:      Mental Status: She is alert and oriented to person, place, and time. Mental status is at baseline.   Psychiatric:         Mood and Affect: Mood normal.         Behavior: Behavior normal.            Laboratory  Lab Results   Component Value Date    WBC 9.62 09/05/2023    HGB 14.2 09/05/2023    HCT 41.0 09/05/2023    MCV 89 09/05/2023     09/05/2023     Lab Results   Component Value Date    GLU 57 (L) 12/13/2023     12/13/2023    K 4.0 12/13/2023     12/13/2023    CO2 25 12/13/2023    BUN 16 12/13/2023    CREATININE 1.1 12/13/2023    CALCIUM 9.2 12/13/2023    MG 2.0 09/05/2023     No results found for: "INR", "PROTIME"  Lab Results "   Component Value Date    HGBA1C 5.1 04/24/2023           Health Maintenance         Date Due Completion Date    COVID-19 Vaccine (1) Never done ---    Pneumococcal Vaccines (Age 0-64) (1 of 2 - PCV) Never done ---    Chlamydia Screening Never done ---    HPV Vaccines (1 - 3-dose series) Never done ---    Influenza Vaccine (1) Never done ---    Pap Smear 06/23/2025 6/23/2022    TETANUS VACCINE 03/19/2033 3/19/2023              ASSESSMENT & PLAN:   Ms. Kiya Renae is a 24 y.o. female who was seen today in clinic for f/u. Following with multiple specialists.  Has derm appt coming up.   Continue BB per Neuro as seems to be helping. Let me know if any change and can repeat imaging again.   Would like annual labs and repeat MELYSSA done, will order.   Anti histone likely elevated 2/2 medications per Rheum.      1. Tremor  -     CBC Auto Differential; Future; Expected date: 04/16/2024  -     COMPREHENSIVE METABOLIC PANEL; Future; Expected date: 04/16/2024  -     HEMOGLOBIN A1C; Future; Expected date: 04/16/2024  -     TSH; Future; Expected date: 04/16/2024  -     Lipid Panel; Future; Expected date: 10/16/2024  -     MELYSSA; Future; Expected date: 04/16/2024  -     ANTI-HISTONE ANTIBODY; Future; Expected date: 04/16/2024  -     propranoloL (INDERAL LA) 80 MG 24 hr capsule; Take 1 capsule (80 mg total) by mouth once daily.  Dispense: 90 capsule; Refill: 3    2. Neurological dysfunction  -     CBC Auto Differential; Future; Expected date: 04/16/2024  -     COMPREHENSIVE METABOLIC PANEL; Future; Expected date: 04/16/2024  -     HEMOGLOBIN A1C; Future; Expected date: 04/16/2024  -     TSH; Future; Expected date: 04/16/2024  -     Lipid Panel; Future; Expected date: 10/16/2024  -     MELYSSA; Future; Expected date: 04/16/2024  -     ANTI-HISTONE ANTIBODY; Future; Expected date: 04/16/2024    3. H/o Lyme disease  -     CBC Auto Differential; Future; Expected date: 04/16/2024  -     COMPREHENSIVE METABOLIC PANEL; Future; Expected  date: 04/16/2024  -     HEMOGLOBIN A1C; Future; Expected date: 04/16/2024  -     TSH; Future; Expected date: 04/16/2024  -     Lipid Panel; Future; Expected date: 10/16/2024  -     MELYSSA; Future; Expected date: 04/16/2024  -     ANTI-HISTONE ANTIBODY; Future; Expected date: 04/16/2024    4. Crohn's disease with rectal bleeding, unspecified gastrointestinal tract location  -     CBC Auto Differential; Future; Expected date: 04/16/2024  -     COMPREHENSIVE METABOLIC PANEL; Future; Expected date: 04/16/2024  -     HEMOGLOBIN A1C; Future; Expected date: 04/16/2024  -     TSH; Future; Expected date: 04/16/2024  -     Lipid Panel; Future; Expected date: 10/16/2024  -     MELYSSA; Future; Expected date: 04/16/2024  -     ANTI-HISTONE ANTIBODY; Future; Expected date: 04/16/2024    5. Plaque psoriasis  -     CBC Auto Differential; Future; Expected date: 04/16/2024  -     COMPREHENSIVE METABOLIC PANEL; Future; Expected date: 04/16/2024  -     HEMOGLOBIN A1C; Future; Expected date: 04/16/2024  -     TSH; Future; Expected date: 04/16/2024  -     Lipid Panel; Future; Expected date: 10/16/2024  -     MELYSSA; Future; Expected date: 04/16/2024  -     ANTI-HISTONE ANTIBODY; Future; Expected date: 04/16/2024    6. Hypopigmentation    7. Migraine without aura and without status migrainosus, not intractable  -     CBC Auto Differential; Future; Expected date: 04/16/2024  -     COMPREHENSIVE METABOLIC PANEL; Future; Expected date: 04/16/2024  -     HEMOGLOBIN A1C; Future; Expected date: 04/16/2024  -     TSH; Future; Expected date: 04/16/2024  -     Lipid Panel; Future; Expected date: 10/16/2024  -     MELYSSA; Future; Expected date: 04/16/2024  -     ANTI-HISTONE ANTIBODY; Future; Expected date: 04/16/2024    8. Brain fog  -     CBC Auto Differential; Future; Expected date: 04/16/2024  -     COMPREHENSIVE METABOLIC PANEL; Future; Expected date: 04/16/2024  -     HEMOGLOBIN A1C; Future; Expected date: 04/16/2024  -     TSH; Future; Expected date:  04/16/2024  -     Lipid Panel; Future; Expected date: 10/16/2024  -     MELYSSA; Future; Expected date: 04/16/2024  -     ANTI-HISTONE ANTIBODY; Future; Expected date: 04/16/2024    9. Family history of autoimmune disorder           Nicol Shell MD  Internal Medicine         Portions of this note may have been generated using voice recognition software.  Please excuse any spelling/grammatical errors. Occasional wrong-word or sound-a-like substitutions may have also occurred due to the inherent limitations of voice recognition software. Please read the chart carefully and recognize, using context, where substitutions have occurred.

## 2024-04-17 ENCOUNTER — OFFICE VISIT (OUTPATIENT)
Dept: OTOLARYNGOLOGY | Facility: CLINIC | Age: 24
End: 2024-04-17
Payer: COMMERCIAL

## 2024-04-17 ENCOUNTER — LAB VISIT (OUTPATIENT)
Dept: LAB | Facility: HOSPITAL | Age: 24
End: 2024-04-17
Attending: OTOLARYNGOLOGY
Payer: COMMERCIAL

## 2024-04-17 VITALS
WEIGHT: 161.94 LBS | SYSTOLIC BLOOD PRESSURE: 123 MMHG | BODY MASS INDEX: 24.62 KG/M2 | DIASTOLIC BLOOD PRESSURE: 82 MMHG | HEART RATE: 69 BPM

## 2024-04-17 DIAGNOSIS — J32.8 OTHER CHRONIC SINUSITIS: Chronic | ICD-10-CM

## 2024-04-17 DIAGNOSIS — J32.8 OTHER CHRONIC SINUSITIS: Primary | Chronic | ICD-10-CM

## 2024-04-17 DIAGNOSIS — H69.93 ETD (EUSTACHIAN TUBE DYSFUNCTION), BILATERAL: Chronic | ICD-10-CM

## 2024-04-17 DIAGNOSIS — J30.9 CHRONIC ALLERGIC RHINITIS: Chronic | ICD-10-CM

## 2024-04-17 DIAGNOSIS — H93.13 TINNITUS AURIUM, BILATERAL: Chronic | ICD-10-CM

## 2024-04-17 DIAGNOSIS — J34.3 HYPERTROPHY OF INFERIOR NASAL TURBINATE: Chronic | ICD-10-CM

## 2024-04-17 PROCEDURE — 3008F BODY MASS INDEX DOCD: CPT | Mod: CPTII,S$GLB,, | Performed by: OTOLARYNGOLOGY

## 2024-04-17 PROCEDURE — 3079F DIAST BP 80-89 MM HG: CPT | Mod: CPTII,S$GLB,, | Performed by: OTOLARYNGOLOGY

## 2024-04-17 PROCEDURE — 3074F SYST BP LT 130 MM HG: CPT | Mod: CPTII,S$GLB,, | Performed by: OTOLARYNGOLOGY

## 2024-04-17 PROCEDURE — 99999 PR PBB SHADOW E&M-EST. PATIENT-LVL IV: CPT | Mod: PBBFAC,,, | Performed by: OTOLARYNGOLOGY

## 2024-04-17 PROCEDURE — 86003 ALLG SPEC IGE CRUDE XTRC EA: CPT | Performed by: OTOLARYNGOLOGY

## 2024-04-17 PROCEDURE — 1160F RVW MEDS BY RX/DR IN RCRD: CPT | Mod: CPTII,S$GLB,, | Performed by: OTOLARYNGOLOGY

## 2024-04-17 PROCEDURE — 86003 ALLG SPEC IGE CRUDE XTRC EA: CPT | Mod: 59 | Performed by: OTOLARYNGOLOGY

## 2024-04-17 PROCEDURE — 99204 OFFICE O/P NEW MOD 45 MIN: CPT | Mod: 25,S$GLB,, | Performed by: OTOLARYNGOLOGY

## 2024-04-17 PROCEDURE — 36415 COLL VENOUS BLD VENIPUNCTURE: CPT | Performed by: OTOLARYNGOLOGY

## 2024-04-17 PROCEDURE — 31231 NASAL ENDOSCOPY DX: CPT | Mod: S$GLB,,, | Performed by: OTOLARYNGOLOGY

## 2024-04-17 PROCEDURE — 1159F MED LIST DOCD IN RCRD: CPT | Mod: CPTII,S$GLB,, | Performed by: OTOLARYNGOLOGY

## 2024-04-17 RX ORDER — FLUTICASONE PROPIONATE 50 MCG
2 SPRAY, SUSPENSION (ML) NASAL DAILY
Qty: 9.9 ML | Refills: 11 | Status: SHIPPED | OUTPATIENT
Start: 2024-04-17

## 2024-04-17 RX ORDER — AZELASTINE 1 MG/ML
1 SPRAY, METERED NASAL 2 TIMES DAILY
Qty: 30 ML | Refills: 3 | Status: SHIPPED | OUTPATIENT
Start: 2024-04-17 | End: 2025-04-17

## 2024-04-17 RX ORDER — AMOXICILLIN AND CLAVULANATE POTASSIUM 875; 125 MG/1; MG/1
1 TABLET, FILM COATED ORAL 2 TIMES DAILY
Qty: 42 TABLET | Refills: 0 | Status: SHIPPED | OUTPATIENT
Start: 2024-04-17 | End: 2024-05-06

## 2024-04-17 RX ORDER — LEVOCETIRIZINE DIHYDROCHLORIDE 5 MG/1
5 TABLET, FILM COATED ORAL NIGHTLY
Qty: 30 TABLET | Refills: 11 | Status: SHIPPED | OUTPATIENT
Start: 2024-04-17 | End: 2024-05-06

## 2024-04-17 NOTE — PROCEDURES
Nasal/sinus endoscopy    Date/Time: 4/17/2024 8:00 AM    Performed by: Luzmaria Sanon MD  Authorized by: Luzmaria Sanon MD    Consent Done?:  Yes (Verbal)  Anesthesia:     Local anesthetic:  Lidocaine 2% and Steven-Synephrine 1/2%  Nose:     Procedure Performed:  Nasal Endoscopy  External:      No external nasal deformity  Intranasal:      Mucosa no polyps     Mucosa ulcers not present     No mucosa lesions present     Turbinates not enlarged     Septum gross deformity  Nasopharynx:      Posterior choanae patent     Eustachian tube patent     Bilateral middle turbinate edema with narrowing of the middle meatus; + crusting bilateral middle meatus and inferior meatus + narrowing of sphenoethmoid recess

## 2024-04-17 NOTE — PROGRESS NOTES
Chief Complaint   Patient presents with    Sinus Problem     Also has had MRI done and was informed that she has blockage sometimes dried up blood clots     Ear Fullness    Tinnitus     For more than 3 months    .    HPI:     Kiya Renae is a 24 y.o. female who presents for evaluation of a several month history of nasal congestion. She notes that she has had crusting(blood tinged) and mucoid discharge in her nose. She notes bilateral maxillary sinus pressure.  She has had bilateral ear fullness/pressure. + Tinnitus both low buzz and high pitched sounds. She does have some post-nasal drip and sore throat.  There is bilateral nasal obstruction. She has used Flonase with some relief.  She does not use sinus rinses or nasal sprays.  . She  admits to headaches. She does have history of migraine  She has not had sinus or nasal surgery. There is no history of sinonasal trauma. She is on skyrizi for Chrohn's and psoriatic arthritis.           Past Medical History:   Diagnosis Date    Allergy     Anxiety 07/31/2021    Arthritis 12/30/2021    PsA    Joint pain     Lyme disease, unspecified     Psoriasis     Skin disease 12/30/2021    psorias on scalp, behind ears, and inside ear canal    Subclinical hypothyroidism 07/05/2023     Social History     Socioeconomic History    Marital status: Single   Tobacco Use    Smoking status: Never     Passive exposure: Never    Smokeless tobacco: Never   Substance and Sexual Activity    Alcohol use: Not Currently     Comment: only socially and a very small amount if ever (one or two )    Drug use: Never    Sexual activity: Never   Other Topics Concern    Are you pregnant or think you may be? No    Breast-feeding No     Social Determinants of Health     Financial Resource Strain: Low Risk  (11/10/2023)    Overall Financial Resource Strain (CARDIA)     Difficulty of Paying Living Expenses: Not very hard   Food Insecurity: No Food Insecurity (11/10/2023)    Hunger Vital Sign      Worried About Running Out of Food in the Last Year: Never true     Ran Out of Food in the Last Year: Never true   Transportation Needs: No Transportation Needs (11/10/2023)    PRAPARE - Transportation     Lack of Transportation (Medical): No     Lack of Transportation (Non-Medical): No   Physical Activity: Sufficiently Active (11/10/2023)    Exercise Vital Sign     Days of Exercise per Week: 6 days     Minutes of Exercise per Session: 60 min   Stress: Stress Concern Present (11/10/2023)    [employer] - Occupational Stress Questionnaire     Feeling of Stress : To some extent   Social Connections: Unknown (11/10/2023)    Social Connection and Isolation Panel [NHANES]     Frequency of Communication with Friends and Family: More than three times a week     Frequency of Social Gatherings with Friends and Family: More than three times a week     Active Member of Clubs or Organizations: Yes     Attends Club or Organization Meetings: More than 4 times per year     Marital Status: Never    Housing Stability: Unknown (11/10/2023)    Housing Stability Vital Sign     Unable to Pay for Housing in the Last Year: No     Unstable Housing in the Last Year: No     Past Surgical History:   Procedure Laterality Date    COLONOSCOPY N/A 8/22/2023    Procedure: COLONOSCOPY;  Surgeon: Stanton Ferrell MD;  Location: [facility];  Service: Endoscopy;  Laterality: N/A;     Family History   Problem Relation Name Age of Onset    Alcohol abuse Mother Olga Renae     Asthma Mother Olga Renae     Rheum arthritis Mother Olga Renae     Psoriasis Mother Olga Renae     Alcohol abuse Father Adrián Renae Jr     Migraines Father Adrián Renae Jr     Acne Father Adrián Renae Jr     Depression Brother Omari Renae     Suicidality Brother Omari Renae     Eczema Brother Omari Renae     Diabetes Maternal Grandmother      Heart failure Maternal Grandfather      Breast cancer Paternal Grandmother      Heart attack Paternal  Grandmother      Breast cancer Paternal Aunt Jennifer Stovall     Lupus Paternal Aunt Jennifer Stovall     Migraines Paternal Aunt Jennifer Stovall            Review of Systems  General: negative for chills, fever or weight loss  Psychological: negative for mood changes or depression  Ophthalmic: negative for blurry vision, photophobia or eye pain  ENT: see HPI  Respiratory: no cough, shortness of breath, or wheezing  Cardiovascular: no chest pain or dyspnea on exertion  Gastrointestinal: no abdominal pain, change in bowel habits, or black/ bloody stools  Musculoskeletal: negative for gait disturbance or muscular weakness  Neurological: no syncope or seizures; no ataxia  Dermatological: negative for puritis,  rash and jaundice  Hematologic/lymphatic: no easy bruising, no new lumps or bumps      Physical Exam:    Vitals:    04/17/24 0820   BP: 123/82   Pulse: 69       Constitutional: Well appearing / communicating without difficutly.  NAD.  Eyes: EOM I Bilaterally  Head/Face: Normocephalic.  Negative paranasal sinus pressure/tenderness.  Salivary glands WNL.  House Brackmann I Bilaterally.    Right Ear: Auricle normal appearance. External Auditory Canal within normal limits,TM w/o masses/lesions/perforations. TM mobility noted.   Left Ear: Auricle normal appearance. External Auditory Canal WNL,TM w/o masses/lesions/perforations. TM mobility noted.  Nose: +nasal septal deviation. Inferior Turbinates 3+ bilaterally. No septal perforation. No masses/lesions. External nasal skin appears normal without masses/lesions.  Oral Cavity: Gingiva/lips within normal limits.  Dentition/gingiva healthy appearing. Mucus membranes moist. Floor of mouth soft, no masses palpated. Oral Tongue mobile. Hard Palate appears normal.    Oropharynx: Base of tongue appears normal. No masses/lesions noted. Tonsillar fossa/pharyngeal wall without lesions. Posterior oropharynx WNL.  Soft palate without masses. Midline uvula.   Neck/Lymphatic: No LAD I-VI  bilaterally.  No thyromegaly.  No masses noted on exam.    Mirror laryngoscopy/nasopharyngoscopy: Active gag reflex.  Unable to perform.      Diagnostic studies:   Outside MRI brain 1/10/2024:  FINDINGS:   There is no intracranial mass, diffusion restriction, parenchymal signal abnormality or pathologic   enhancement.     The ventricles and sulcal pattern are normal.  There are no extra-axial collections.  There is no   evidence of tonsillar ectopia.  Flow voids are preserved within the dominant intracerebral   vasculature, suggestive of vessel patency.     Mastoid air cells are clear.  There is minimal mucosal thickening in both maxillary sinuses and   scattered throughout the ethmoid complexes.  The orbital contents are unremarkable.     Assessment:    ICD-10-CM ICD-9-CM    1. Other chronic sinusitis  J32.8 473.8       2. Hypertrophy of inferior nasal turbinate  J34.3 478.0       3. ETD (Eustachian tube dysfunction), bilateral  H69.93 381.81       4. Tinnitus aurium, bilateral  H93.13 388.31       5. Chronic allergic rhinitis  J30.9 477.9         The primary encounter diagnosis was Other chronic sinusitis. Diagnoses of Hypertrophy of inferior nasal turbinate, ETD (Eustachian tube dysfunction), bilateral, Tinnitus aurium, bilateral, and Chronic allergic rhinitis were also pertinent to this visit.      Plan:  No orders of the defined types were placed in this encounter.      Recommend Adrian Med Sinus Rinse BID; distilled water only(maintenance).  Start Flonase 2 sprays per nostril daiy (spray laterally).  Start xyzal 5mg PO daily  Start Augmentin 875mg PO BID for 21 days  I would recommend a post-treatment CT of the sinuses for further evaluation.   Obtain audiogram to evaluate tinnitus    Luzmaria Staples MD
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
## 2024-04-18 ENCOUNTER — OFFICE VISIT (OUTPATIENT)
Dept: NEUROLOGY | Facility: CLINIC | Age: 24
End: 2024-04-18
Payer: COMMERCIAL

## 2024-04-18 VITALS
HEIGHT: 68 IN | HEART RATE: 71 BPM | DIASTOLIC BLOOD PRESSURE: 78 MMHG | BODY MASS INDEX: 24.4 KG/M2 | SYSTOLIC BLOOD PRESSURE: 123 MMHG | WEIGHT: 161 LBS

## 2024-04-18 DIAGNOSIS — R29.90 NEUROLOGICAL DYSFUNCTION: ICD-10-CM

## 2024-04-18 DIAGNOSIS — Z86.19 H/O LYME DISEASE: ICD-10-CM

## 2024-04-18 DIAGNOSIS — Z71.89 COUNSELING REGARDING GOALS OF CARE: ICD-10-CM

## 2024-04-18 DIAGNOSIS — E03.8 SUBCLINICAL HYPOTHYROIDISM: ICD-10-CM

## 2024-04-18 DIAGNOSIS — R25.1 TREMOR: Primary | ICD-10-CM

## 2024-04-18 DIAGNOSIS — F44.4 FUNCTIONAL NEUROLOGICAL SYMPTOM DISORDER WITH ABNORMAL MOVEMENT: ICD-10-CM

## 2024-04-18 PROCEDURE — 3008F BODY MASS INDEX DOCD: CPT | Mod: CPTII,S$GLB,, | Performed by: PHYSICIAN ASSISTANT

## 2024-04-18 PROCEDURE — 3078F DIAST BP <80 MM HG: CPT | Mod: CPTII,S$GLB,, | Performed by: PHYSICIAN ASSISTANT

## 2024-04-18 PROCEDURE — 99999 PR PBB SHADOW E&M-EST. PATIENT-LVL IV: CPT | Mod: PBBFAC,,, | Performed by: PHYSICIAN ASSISTANT

## 2024-04-18 PROCEDURE — 3074F SYST BP LT 130 MM HG: CPT | Mod: CPTII,S$GLB,, | Performed by: PHYSICIAN ASSISTANT

## 2024-04-18 PROCEDURE — 1159F MED LIST DOCD IN RCRD: CPT | Mod: CPTII,S$GLB,, | Performed by: PHYSICIAN ASSISTANT

## 2024-04-18 PROCEDURE — 1160F RVW MEDS BY RX/DR IN RCRD: CPT | Mod: CPTII,S$GLB,, | Performed by: PHYSICIAN ASSISTANT

## 2024-04-18 PROCEDURE — 99214 OFFICE O/P EST MOD 30 MIN: CPT | Mod: S$GLB,,, | Performed by: PHYSICIAN ASSISTANT

## 2024-04-19 ENCOUNTER — OFFICE VISIT (OUTPATIENT)
Dept: GASTROENTEROLOGY | Facility: CLINIC | Age: 24
End: 2024-04-19
Payer: COMMERCIAL

## 2024-04-19 VITALS — HEIGHT: 68 IN | WEIGHT: 160.94 LBS | BODY MASS INDEX: 24.39 KG/M2

## 2024-04-19 DIAGNOSIS — R19.5 OCCULT BLOOD POSITIVE STOOL: ICD-10-CM

## 2024-04-19 DIAGNOSIS — R19.4 CHANGE IN BOWEL HABITS: ICD-10-CM

## 2024-04-19 DIAGNOSIS — K59.00 CONSTIPATION, UNSPECIFIED CONSTIPATION TYPE: ICD-10-CM

## 2024-04-19 DIAGNOSIS — K92.1 HEMATOCHEZIA: Primary | ICD-10-CM

## 2024-04-19 DIAGNOSIS — Z87.2 HISTORY OF PSORIATIC ARTHRITIS: ICD-10-CM

## 2024-04-19 DIAGNOSIS — R10.84 GENERALIZED ABDOMINAL PAIN: ICD-10-CM

## 2024-04-19 DIAGNOSIS — R11.2 NON-INTRACTABLE VOMITING WITH NAUSEA: ICD-10-CM

## 2024-04-19 LAB
AMER SYCAMORE IGE QN: <0.1 KU/L
CTP QC/QA: YES
FEATHER PANEL #2: <0.1 KU/L
FECAL OCCULT BLOOD, POC: POSITIVE

## 2024-04-19 PROCEDURE — 1160F RVW MEDS BY RX/DR IN RCRD: CPT | Mod: CPTII,S$GLB,, | Performed by: NURSE PRACTITIONER

## 2024-04-19 PROCEDURE — 1159F MED LIST DOCD IN RCRD: CPT | Mod: CPTII,S$GLB,, | Performed by: NURSE PRACTITIONER

## 2024-04-19 PROCEDURE — 3008F BODY MASS INDEX DOCD: CPT | Mod: CPTII,S$GLB,, | Performed by: NURSE PRACTITIONER

## 2024-04-19 PROCEDURE — 99999 PR PBB SHADOW E&M-EST. PATIENT-LVL IV: CPT | Mod: PBBFAC,,, | Performed by: NURSE PRACTITIONER

## 2024-04-19 PROCEDURE — 99214 OFFICE O/P EST MOD 30 MIN: CPT | Mod: S$GLB,,, | Performed by: NURSE PRACTITIONER

## 2024-04-19 NOTE — PROGRESS NOTES
"Subjective:       Patient ID: Kiya Renae is a 24 y.o. female Body mass index is 24.47 kg/m².    Chief Complaint: Crohn's Disease    This patient is new to me.  Established patient of Dr. Ferrell.     Patient reports she has been in communication with Dr. Conrad in [place] and reports he differs with Dr. Ferrell's findings of her 8/22/2023 colonoscopy. Patient reports Dr. Conrad believes he saw an ulcer on her 8/22/2023 colonoscopy and he ordered prometheus panel done in 5/2023 and reports it was positive. He diagnosed her with Crohn's. She was taking it for a year for Psoriatic Arthritis but Dr. Conrad increased the dosage to the Crohn's dosing once he diagnosed her with Crohn's and it helped improve her GI symptoms until recently. He has been prescribing her skyrizi (360 mg every 8 weeks).    Reviewed 8/11/2023 visit note with Dr. Ferrell "HPI:  Kiya Renae is a 23 y.o. female here today for abd pain , diarrhea, wt loss , new patient.  Previously seeing GI doc in [facility].  She is having 6 months or so of N/V and abdominal pain, evalauted by GI md in [facility] but recently moved here. Assoc changes bowels, no blood in stool, no aggrivating factors. Was taking levsin and zofran and protonix. Generalized pain lower abd , tender to touch.  EGD recently few months ago was normal. She was dx with PsA and started skyrizi.   Was getting jaskaran Raeann every 12 weeks but around weeks 11 and 12 she noticed worsening symptoms including joint pains but as well as flares of her bowel movements, and thus this was increased recently did every 10 weeks.  She had a Prometheus panel that was positive for sacchromyces antibody and thus it was presumed that she may have a diagnosis of Crohn's disease.  NSAIDs - previously heavy use, currently none, now using tylenol.  Dx with PsA , getting skyrizi q 10 weeks , 150mg."     GI Problem  The primary symptoms include abdominal pain, nausea, vomiting (intermittent, once every 2 weeks, " "triggered by exercise or eating; emesis of bile or food; denies bright red blood or coffee ground emesis) and hematochezia. Primary symptoms do not include fever, weight loss, fatigue, diarrhea, melena, hematemesis, jaundice or dysuria. The illness began more than 7 days ago (started several years ago). Progression since onset: has improved since taking skyrizi.   The abdominal pain began more than 2 days ago. The abdominal pain is generalized (occurs 1-3 times every 2 weeks, lasts1-3 days; described as "feels like my intestines are trying to move out of me" feels stool moving through).   The hematochezia began more than 1 week ago (started 1/2024). Frequency: intermittent, occurs once every 2-8 weeks, typically the week before her next skyrizi dose is due; small amount of dark red blood in bowl and in stool; denies bleeding in between bowel movements. The hematochezia is a new problem.   The illness is also significant for constipation (started in 1/2024; bowel movements are currently once every 2 days). The illness does not include chills, dysphagia or odynophagia. Significant associated medical issues include inflammatory bowel disease (diagnosed by Dr. Conrad) and GERD (none recently; PAST TREATMENT: Protonix).     Review of Systems   Constitutional:  Positive for appetite change (decreased at times). Negative for chills, fatigue, fever and weight loss.        Taking Augmentin currently for sinus infection   HENT:  Negative for sore throat and trouble swallowing.    Respiratory:  Negative for cough, choking and shortness of breath.    Cardiovascular:  Negative for chest pain.   Gastrointestinal:  Positive for abdominal pain, anal bleeding, blood in stool, constipation (started in 1/2024; bowel movements are currently once every 2 days), hematochezia, nausea, rectal pain (intermittent, occurs mostly with bowel movements) and vomiting (intermittent, once every 2 weeks, triggered by exercise or eating; emesis of " bile or food; denies bright red blood or coffee ground emesis). Negative for diarrhea, dysphagia, hematemesis, jaundice and melena.   Genitourinary:  Negative for difficulty urinating, dysuria and flank pain.   Neurological:  Negative for weakness.         Patient's last menstrual period was 03/29/2024 (exact date).  Past Medical History:   Diagnosis Date    Allergy     Anxiety 07/31/2021    Arthritis 12/30/2021    PsA    Joint pain     Lyme disease, unspecified     Psoriasis     Skin disease 12/30/2021    psorias on scalp, behind ears, and inside ear canal    Subclinical hypothyroidism 07/05/2023     Past Surgical History:   Procedure Laterality Date    COLONOSCOPY N/A 08/22/2023    Procedure: COLONOSCOPY;  Surgeon: Stanton Ferrell MD;  Location: [facility];  Service: Endoscopy;  Laterality: N/A;    UPPER GASTROINTESTINAL ENDOSCOPY  05/2023    in [place], minor swelling to lower esophagus per patient report     Family History   Problem Relation Name Age of Onset    Alcohol abuse Mother Olga Renae     Asthma Mother Olga Renae     Rheum arthritis Mother Olga Renae     Psoriasis Mother Olga Renae     Alcohol abuse Father Adrián Renae Jr     Migraines Father Adrián Renae Jr     Acne Father Adrián Renae Jr     Depression Brother Omari Renae     Suicidality Brother Omari Renae     Eczema Brother Omari Renae     Crohn's disease Paternal Aunt Jennifer Cranor     Breast cancer Paternal Aunt Jennifer Cranor     Lupus Paternal Aunt Jennifer Cranor     Migraines Paternal Aunt Jennifer Cranor     Diabetes Maternal Grandmother      Heart failure Maternal Grandfather      Breast cancer Paternal Grandmother      Heart attack Paternal Grandmother      Colon cancer Neg Hx      Esophageal cancer Neg Hx      Stomach cancer Neg Hx       Social History     Tobacco Use    Smoking status: Never     Passive exposure: Never    Smokeless tobacco: Never   Substance Use Topics    Alcohol use: Not Currently     Comment: only socially and a very small  amount if ever (one or two)    Drug use: Never     Wt Readings from Last 10 Encounters:   04/19/24 73 kg (160 lb 15 oz)   04/18/24 73 kg (161 lb)   04/17/24 73.5 kg (161 lb 14.9 oz)   04/16/24 73.2 kg (161 lb 6 oz)   03/28/24 70.3 kg (155 lb)   03/06/24 70.8 kg (156 lb)   02/16/24 71.1 kg (156 lb 10.2 oz)   02/05/24 70.3 kg (154 lb 15.7 oz)   01/24/24 70.4 kg (155 lb 1.5 oz)   01/17/24 68 kg (150 lb)     Lab Results   Component Value Date    WBC 9.62 09/05/2023    HGB 14.2 09/05/2023    HCT 41.0 09/05/2023    MCV 89 09/05/2023     09/05/2023     CMP  Sodium   Date Value Ref Range Status   12/13/2023 142 136 - 145 mmol/L Final     Potassium   Date Value Ref Range Status   12/13/2023 4.0 3.5 - 5.1 mmol/L Final     Chloride   Date Value Ref Range Status   12/13/2023 109 95 - 110 mmol/L Final     CO2   Date Value Ref Range Status   12/13/2023 25 23 - 29 mmol/L Final     Glucose   Date Value Ref Range Status   12/13/2023 57 (L) 70 - 110 mg/dL Final     BUN   Date Value Ref Range Status   12/13/2023 16 6 - 20 mg/dL Final     Creatinine   Date Value Ref Range Status   12/13/2023 1.1 0.5 - 1.4 mg/dL Final     Calcium   Date Value Ref Range Status   12/13/2023 9.2 8.7 - 10.5 mg/dL Final     Total Protein   Date Value Ref Range Status   09/05/2023 7.2 6.0 - 8.4 g/dL Final     Albumin   Date Value Ref Range Status   09/05/2023 4.5 3.5 - 5.2 g/dL Final     Total Bilirubin   Date Value Ref Range Status   09/05/2023 0.2 0.1 - 1.0 mg/dL Final     Comment:     For infants and newborns, interpretation of results should be based  on gestational age, weight and in agreement with clinical  observations.    Premature Infant recommended reference ranges:  Up to 24 hours.............<8.0 mg/dL  Up to 48 hours............<12.0 mg/dL  3-5 days..................<15.0 mg/dL  6-29 days.................<15.0 mg/dL       Alkaline Phosphatase   Date Value Ref Range Status   09/05/2023 65 55 - 135 U/L Final     AST   Date Value Ref Range  Status   09/05/2023 13 10 - 40 U/L Final     ALT   Date Value Ref Range Status   09/05/2023 8 (L) 10 - 44 U/L Final     Anion Gap   Date Value Ref Range Status   12/13/2023 8 8 - 16 mmol/L Final     Lab Results   Component Value Date    TSH 2.444 12/13/2023     Lab Results   Component Value Date    CRP 0.1 09/15/2023     Lab Results   Component Value Date    SEDRATE <2 09/15/2023     Reviewed prior medical records including endoscopy history (see surgical history/procedures).    Objective:      Physical Exam  Vitals and nursing note reviewed. Exam conducted with a chaperone present.   Constitutional:       General: She is not in acute distress.     Appearance: Normal appearance. She is well-developed. She is not diaphoretic.   HENT:      Mouth/Throat:      Lips: Pink. No lesions.      Mouth: Mucous membranes are moist. No oral lesions.      Tongue: No lesions.      Pharynx: Oropharynx is clear. No pharyngeal swelling or posterior oropharyngeal erythema.   Eyes:      General: No scleral icterus.     Conjunctiva/sclera: Conjunctivae normal.   Pulmonary:      Effort: Pulmonary effort is normal. No respiratory distress.      Breath sounds: Normal breath sounds. No wheezing.   Abdominal:      General: Bowel sounds are normal. There is no distension or abdominal bruit.      Palpations: Abdomen is soft. Abdomen is not rigid. There is no mass.      Tenderness: There is generalized abdominal tenderness (moderate). There is guarding. There is no rebound. Negative signs include Chapa's sign and McBurney's sign.   Genitourinary:     Rectum: Guaiac result positive. No mass, tenderness, anal fissure, external hemorrhoid or internal hemorrhoid. Normal anal tone.      Comments: Chaperone in room for rectal exam: JACOB Thompson LPN.  Skin:     General: Skin is warm and dry.      Coloration: Skin is not jaundiced or pale.      Findings: No erythema or rash.   Neurological:      Mental Status: She is alert and oriented to person, place,  and time.   Psychiatric:         Behavior: Behavior normal.         Thought Content: Thought content normal.         Judgment: Judgment normal.         Assessment:       1. Hematochezia    2. Occult blood positive stool    3. Generalized abdominal pain    4. Change in bowel habits    5. Constipation, unspecified constipation type    6. Non-intractable vomiting with nausea    7. History of psoriatic arthritis        Plan:     Patient agreed to sign medical records release consent for us to obtain records from Dr. Conrad    Hematochezia & Occult positive stool  -     CT Enterography Abd_Pelvis With Contrast; Future; Expected date: 04/19/2024  -     CBC Without Differential; Future; Expected date: 04/19/2024  -     C-reactive protein; Future; Expected date: 04/19/2024  -     Calprotectin, Stool; Future; Expected date: 04/19/2024  -     POCT Occult Blood Stool  - discussed about Colonoscopy, including the risks and benefits of colonoscopy, patient verbalized understanding but patient declined colonoscopy.  - discussed about different etiologies that can cause rectal bleeding, such as but not limited to diverticulosis, polyps, colon mass, colon inflammation or infection, anal fissure or hemorrhoids.   - You may resume normal activity as long as you feel well.  - Avoid/minimize NSAIDs such as ibuprofen (Advil, Motrin) and naproxen (Aleve and Naprosyn).  - Avoid alcohol.  -     Ambulatory referral/consult to Gastroenterology; Future; Expected date: 04/29/2024; referral placed to [facility] since patient lives closer to that location    Generalized abdominal pain  -     CT Enterography Abd_Pelvis With Contrast; Future; Expected date: 04/19/2024  -     CBC Without Differential; Future; Expected date: 04/19/2024  -     C-reactive protein; Future; Expected date: 04/19/2024  -     Lipase; Future; Expected date: 04/19/2024  -     Creatinine, Serum; Future; Expected date: 04/19/2024  -     Pregnancy, urine rapid;  Future; Expected date: 04/19/2024  -     Hepatic Function Panel; Future; Expected date: 04/19/2024  - discussed about Colonoscopy, including the risks and benefits of colonoscopy, patient verbalized understanding but patient declined colonoscopy.  -     Ambulatory referral/consult to Gastroenterology; Future; Expected date: 04/29/2024; referral placed to [facility] since patient lives closer to that location    Change in bowel habits: Constipation, unspecified constipation type  -     CT Enterography Abd_Pelvis With Contrast; Future; Expected date: 04/19/2024  -     TSH; Future; Expected date: 04/19/2024  - discussed about Colonoscopy, including the risks and benefits of colonoscopy, patient verbalized understanding but patient declined colonoscopy.  - Recommend daily exercise as tolerated & adequate water intake (six 8-oz glasses of water daily)  -Recommend taking an OTC stool softener such as Colace as directed to avoid hard stools and straining with bowel movements PRN  -Recommend trying OTC MiraLax once daily (17g PO) as directed PRN  -If still no improvement with these measures, call/follow-up  -     Ambulatory referral/consult to Gastroenterology; Future; Expected date: 04/29/2024; referral placed to [facility] since patient lives closer to that location    Non-intractable vomiting with nausea  -     CT Enterography Abd_Pelvis With Contrast; Future; Expected date: 04/19/2024  - discussed about EGD, including the risks and benefits, patient verbalized understanding but patient declined EGD.  -     Ambulatory referral/consult to Gastroenterology; Future; Expected date: 04/29/2024; referral placed to [facility] since patient lives closer to that location    History of psoriatic arthritis  - CONTINUE SKYRIZI AS PRESCRIBED, FOLLOW-UP WITH PRESCRIBER FOR CONTINUED EVALUATION AND MANAGEMENT    Follow up in about 1 month (around 5/19/2024), or if symptoms worsen or fail to improve, for  FOLLOW-UP WITH GI PHYSICIAN FOR CONTINUED EVALUATION & MANAGEMENT.      If no improvement in symptoms or symptoms worsen, call/follow-up at clinic or go to ER.        39 minutes of total time spent on the encounter, which includes face to face time and non-face to face time preparing to see the patient (e.g., review of tests), Obtaining and/or reviewing separately obtained history, Documenting clinical information in the electronic or other health record, Independently interpreting results (not separately reported) and communicating results to the patient/family/caregiver, or Care coordination (not separately reported).

## 2024-04-23 LAB
A ALTERNATA IGE QN: <0.1 KU/L
A FUMIGATUS IGE QN: <0.1 KU/L
ALLERGEN BOXELDER MAPLE TREE IGE: <0.1 KU/L
ALLERGEN MAPLE (BOX ELDER) CLASS: NORMAL
ALLERGEN MULBERRY CLASS: NORMAL
ALLERGEN MULBERRY TREE IGE: <0.1 KU/L
ALLERGEN PIGWEED IGE: <0.1 KU/L
ALLERGEN WHITE ASH TREE IGE: <0.1 KU/L
BALD CYPRESS IGE QN: <0.1 KU/L
BERMUDA GRASS IGE QN: <0.1 KU/L
C HERBARUM IGE QN: <0.1 KU/L
CAT DANDER IGE QN: <0.1 KU/L
CEDAR IGE QN: <0.1 KU/L
COCKLEBUR IGE QN: <0.1 KU/L
COMMON PIGWEED CLASS: NORMAL
COMMON RAGWEED IGE QN: <0.1 KU/L
D FARINAE IGE QN: <0.1 KU/L
D PTERONYSS IGE QN: <0.1 KU/L
DEPRECATED A ALTERNATA IGE RAST QL: NORMAL
DEPRECATED A FUMIGATUS IGE RAST QL: NORMAL
DEPRECATED BALD CYPRESS IGE RAST QL: NORMAL
DEPRECATED BERMUDA GRASS IGE RAST QL: NORMAL
DEPRECATED C HERBARUM IGE RAST QL: NORMAL
DEPRECATED CAT DANDER IGE RAST QL: NORMAL
DEPRECATED CEDAR IGE RAST QL: NORMAL
DEPRECATED COCKLEBUR IGE RAST QL: NORMAL
DEPRECATED COMMON RAGWEED IGE RAST QL: NORMAL
DEPRECATED D FARINAE IGE RAST QL: NORMAL
DEPRECATED D PTERONYSS IGE RAST QL: NORMAL
DEPRECATED DOG DANDER IGE RAST QL: NORMAL
DEPRECATED ELDER IGE RAST QL: NORMAL
DEPRECATED ENGL PLANTAIN IGE RAST QL: NORMAL
DEPRECATED GOOSEFOOT IGE RAST QL: NORMAL
DEPRECATED JOHNSON GRASS IGE RAST QL: NORMAL
DEPRECATED KENT BLUE GRASS IGE RAST QL: NORMAL
DEPRECATED M RACEMOSUS IGE RAST QL: NORMAL
DEPRECATED MUGWORT IGE RAST QL: NORMAL
DEPRECATED NETTLE IGE RAST QL: NORMAL
DEPRECATED P NOTATUM IGE RAST QL: NORMAL
DEPRECATED PECAN/HICK TREE IGE RAST QL: NORMAL
DEPRECATED ROACH IGE RAST QL: NORMAL
DEPRECATED SHEEP SORREL IGE RAST QL: NORMAL
DEPRECATED SILVER BIRCH IGE RAST QL: NORMAL
DEPRECATED TIMOTHY IGE RAST QL: NORMAL
DEPRECATED WHITE OAK IGE RAST QL: NORMAL
DOG DANDER IGE QN: <0.1 KU/L
ELDER IGE QN: <0.1 KU/L
ELM CEDAR CLASS: NORMAL
ELM CEDAR, IGE: <0.1 KU/L
ENGL PLANTAIN IGE QN: <0.1 KU/L
GOOSEFOOT IGE QN: <0.1 KU/L
JOHNSON GRASS IGE QN: <0.1 KU/L
KENT BLUE GRASS IGE QN: <0.1 KU/L
M RACEMOSUS IGE QN: <0.1 KU/L
MUGWORT IGE QN: <0.1 KU/L
NETTLE IGE QN: <0.1 KU/L
P NOTATUM IGE QN: <0.1 KU/L
PECAN/HICK TREE IGE QN: <0.1 KU/L
ROACH IGE QN: <0.1 KU/L
SHEEP SORREL IGE QN: <0.1 KU/L
SILVER BIRCH IGE QN: <0.1 KU/L
TIMOTHY IGE QN: <0.1 KU/L
WHITE ASH CLASS: NORMAL
WHITE OAK IGE QN: <0.1 KU/L

## 2024-05-03 ENCOUNTER — HOSPITAL ENCOUNTER (OUTPATIENT)
Dept: RADIOLOGY | Facility: HOSPITAL | Age: 24
Discharge: HOME | End: 2024-05-03
Attending: NURSE PRACTITIONER
Payer: COMMERCIAL

## 2024-05-03 DIAGNOSIS — K92.1 HEMATOCHEZIA: ICD-10-CM

## 2024-05-03 DIAGNOSIS — K59.00 CONSTIPATION, UNSPECIFIED CONSTIPATION TYPE: ICD-10-CM

## 2024-05-03 DIAGNOSIS — R11.2 NON-INTRACTABLE VOMITING WITH NAUSEA: ICD-10-CM

## 2024-05-03 DIAGNOSIS — R10.84 GENERALIZED ABDOMINAL PAIN: ICD-10-CM

## 2024-05-03 PROCEDURE — A9698 NON-RAD CONTRAST MATERIALNOC: HCPCS | Mod: PO | Performed by: NURSE PRACTITIONER

## 2024-05-03 PROCEDURE — 25500020 PHARM REV CODE 255: Mod: PO | Performed by: NURSE PRACTITIONER

## 2024-05-03 PROCEDURE — 74177 CT ABD & PELVIS W/CONTRAST: CPT | Mod: 26,,, | Performed by: STUDENT IN AN ORGANIZED HEALTH CARE EDUCATION/TRAINING PROGRAM

## 2024-05-03 PROCEDURE — 74177 CT ABD & PELVIS W/CONTRAST: CPT | Mod: TC,PO

## 2024-05-03 RX ADMIN — IOHEXOL 75 ML: 350 INJECTION, SOLUTION INTRAVENOUS at 02:05

## 2024-05-03 RX ADMIN — BARIUM SULFATE 1350 ML: 1 SUSPENSION ORAL at 02:05

## 2024-05-06 ENCOUNTER — OFFICE VISIT (OUTPATIENT)
Dept: RHEUMATOLOGY | Facility: CLINIC | Age: 24
End: 2024-05-06
Payer: COMMERCIAL

## 2024-05-06 VITALS
WEIGHT: 159.94 LBS | BODY MASS INDEX: 24.24 KG/M2 | DIASTOLIC BLOOD PRESSURE: 86 MMHG | SYSTOLIC BLOOD PRESSURE: 131 MMHG | HEIGHT: 68 IN | HEART RATE: 70 BPM

## 2024-05-06 DIAGNOSIS — Z79.899 DRUG-INDUCED IMMUNODEFICIENCY: ICD-10-CM

## 2024-05-06 DIAGNOSIS — K50.90 CROHN'S DISEASE WITHOUT COMPLICATION, UNSPECIFIED GASTROINTESTINAL TRACT LOCATION: ICD-10-CM

## 2024-05-06 DIAGNOSIS — R25.1 TREMOR: ICD-10-CM

## 2024-05-06 DIAGNOSIS — L40.50 PSORIATIC ARTHRITIS: Primary | ICD-10-CM

## 2024-05-06 DIAGNOSIS — D84.821 DRUG-INDUCED IMMUNODEFICIENCY: ICD-10-CM

## 2024-05-06 PROCEDURE — 1159F MED LIST DOCD IN RCRD: CPT | Mod: CPTII,S$GLB,, | Performed by: STUDENT IN AN ORGANIZED HEALTH CARE EDUCATION/TRAINING PROGRAM

## 2024-05-06 PROCEDURE — 99999 PR PBB SHADOW E&M-EST. PATIENT-LVL III: CPT | Mod: PBBFAC,,, | Performed by: STUDENT IN AN ORGANIZED HEALTH CARE EDUCATION/TRAINING PROGRAM

## 2024-05-06 PROCEDURE — 3075F SYST BP GE 130 - 139MM HG: CPT | Mod: CPTII,S$GLB,, | Performed by: STUDENT IN AN ORGANIZED HEALTH CARE EDUCATION/TRAINING PROGRAM

## 2024-05-06 PROCEDURE — 99214 OFFICE O/P EST MOD 30 MIN: CPT | Mod: S$GLB,,, | Performed by: STUDENT IN AN ORGANIZED HEALTH CARE EDUCATION/TRAINING PROGRAM

## 2024-05-06 PROCEDURE — 3079F DIAST BP 80-89 MM HG: CPT | Mod: CPTII,S$GLB,, | Performed by: STUDENT IN AN ORGANIZED HEALTH CARE EDUCATION/TRAINING PROGRAM

## 2024-05-06 PROCEDURE — 3008F BODY MASS INDEX DOCD: CPT | Mod: CPTII,S$GLB,, | Performed by: STUDENT IN AN ORGANIZED HEALTH CARE EDUCATION/TRAINING PROGRAM

## 2024-05-06 NOTE — PROGRESS NOTES
RHEUMATOLOGY OUTPATIENT CLINIC NOTE    5/6/2024    Attending Rheumatologist: Trice Coombs  Primary Care Provider: Nicol Shell MD   Physician Requesting Consultation: No referring provider defined for this encounter.  Chief Complaint/Reason For Consultation:  Psoriatic Arthritis      Subjective:       HPI  Kiya Renae is a 24 y.o. White female with Crohn's Disease, tremors who comes for evaluation of joint pain.     Interim history  -Initial consult on 2/2024  -In March 2024, she saw sports medicine due to bilateral hand and wrist pain. Focal wrist pain likely due to irritation of the right TFCC. Recommended voltaren, braces, rest from wrist impact exercises. Had OMT performed.   -she established care with GI in [facility]  -doing well. Denies dactylitis, worsening joint pain or swelling.   -she is receiving skyrizi from her GI in [place], no issues with refills   -has to do labs for GI but has not done them yet.     Disease history    Used to follow with rheumatology in [place]. Records reviewed. Last visit in 7/2022.   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 Negative serologies. Methotrexate caused nausea. Failed nabumetone.   Otezla - developed increased depression and GI upset.   Skyrizi - significant improvement of joint and skin disease  Then, diagnosed with Crohn's Disease - skyrizi dose was increased to IBD dose. This was in 8/2023 by GI in [place].   Colonoscopy in 8/2023 reportedly normal. However GI from [place] felt that there were some active lesions from pictures on colonoscopy.     She has history of lyme disease. Was on minocycline for a while. Stopped about a year and a half.     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   Has been dealing with different neurological issues. Significant tremor in left hand along with left leg numbness at times. Sees neurology. So far, workup has been unrevealing. MRI brain and c-spine are essentially  unremarkable. EMG/NCS of left upper and lower extremity was normal. Autoimmune movement disorder panel is negative. Symptoms improve with propanolol high doses which she is able to tolerate. She is concerned that this could be related to MS.     Her psoriasis and joint pain have been stable. No more dactylitis. Occasional low back pain. No eye inflammation.   PCP prescribed diflucan for possible fungal infection as she has been dealing with cracked lip commissure. Also feels that her sinuses are clogged. She notes that this happens almost at the end of the skyrizi cycle, and symptoms slowly start to improve afterwards.   She is due for skyrizi tomorrow. Receives refills from her GI in [place]  She reports that last week she had one episode of bloody stool and since then has constipation. She has not tried any OTC medications.       12/2023  MELYSSA negative  Anti histone 1.1 (<0.9)  Negative DSDNA, SSA, SSB, RNA eula 3, Scl-70, SM/RNP, TH/To  RF, CCP negative  Extended myositis panel negative    9/2023  Lyme antibodies negative  TPO negative  ESR and CRP negative    Chronic comorbid conditions affecting medical decision making today:  Past Medical History:   Diagnosis Date    Allergy     Anxiety 07/31/2021    Arthritis 12/30/2021    PsA    Joint pain     Lyme disease, unspecified     Psoriasis     Skin disease 12/30/2021    psorias on scalp, behind ears, and inside ear canal    Subclinical hypothyroidism 07/05/2023     Past Surgical History:   Procedure Laterality Date    COLONOSCOPY N/A 08/22/2023    Procedure: COLONOSCOPY;  Surgeon: Stanton Ferrell MD;  Location: [facility];  Service: Endoscopy;  Laterality: N/A;    UPPER GASTROINTESTINAL ENDOSCOPY  05/2023    in [place], minor swelling to lower esophagus per patient report     Family History   Problem Relation Name Age of Onset    Alcohol abuse Mother Olga Renae     Asthma Mother Olga Renae     Rheum arthritis Mother Olga Renae     Psoriasis Mother Olga Renae      Alcohol abuse Father Adrián Renae Jr     Migraines Father Adrián Renae Jr     Acne Father Adrián Renae Jr     Depression Brother Omari Renae     Suicidality Brother Omari Renae     Eczema Brother Omari Renae     Crohn's disease Paternal Aunt Jennifer Cranor     Breast cancer Paternal Aunt Jennifer Cranor     Lupus Paternal Aunt Jennifer Cranor     Migraines Paternal Aunt Jennifer Cranor     Diabetes Maternal Grandmother      Heart failure Maternal Grandfather      Breast cancer Paternal Grandmother      Heart attack Paternal Grandmother      Colon cancer Neg Hx      Esophageal cancer Neg Hx      Stomach cancer Neg Hx       Social History     Substance and Sexual Activity   Alcohol Use Not Currently    Comment: only socially and a very small amount if ever (one or two)     Social History     Tobacco Use   Smoking Status Never    Passive exposure: Never   Smokeless Tobacco Never     Social History     Substance and Sexual Activity   Drug Use Never       Current Outpatient Medications:     azelastine (ASTELIN) 137 mcg (0.1 %) nasal spray, 1 spray (137 mcg total) by Nasal route 2 (two) times daily., Disp: 30 mL, Rfl: 3    clobetasoL (TEMOVATE) 0.05 % external solution, Apply topically once daily. Use to affected areas for up to 2 weeks then take a 1 week break or decrease to 3 times weekly. Do not apply to groin or face, Disp: 50 mL, Rfl: 2    dicyclomine (BENTYL) 20 mg tablet, Take 1 tablet (20 mg total) by mouth every 8 (eight) hours as needed (abdominal cramping)., Disp: 120 tablet, Rfl: 0    fluocinolone acetonide oiL 0.01 % Drop, Place 1 application in ear(s) once daily. Use to affected areas for up to 2 weeks then take a 1 week break or decrease to 3 times weekly. For use in ears, Disp: 20 mL, Rfl: 2    fluticasone propionate (FLONASE) 50 mcg/actuation nasal spray, 2 sprays (100 mcg total) by Each Nostril route once daily., Disp: 9.9 mL, Rfl: 11    gabapentin (NEURONTIN) 100 MG capsule, Take 100 mg by mouth daily as needed., Disp:  , Rfl:     pantoprazole (PROTONIX) 40 MG tablet, Take 1 tablet (40 mg total) by mouth once daily., Disp: 30 tablet, Rfl: 3    propranoloL (INDERAL LA) 80 MG 24 hr capsule, Take 1 capsule (80 mg total) by mouth once daily., Disp: 90 capsule, Rfl: 3    risankizumab-rzaa (SKYRIZI) 150 mg/mL PnIj, 360 mg every 8 weeks., Disp: , Rfl:     sertraline 200 mg Cap, , Disp: , Rfl:     topiramate (TOPAMAX) 25 MG tablet, Take 1 tablet (25 mg total) by mouth once daily., Disp: 30 tablet, Rfl: 0    tretinoin (RETIN-A) 0.05 % cream, Apply topically every evening. Start twice weekly and increase as tolerated. Pea sized amount to entire face., Disp: 20 g, Rfl: 3     Objective:         Vitals:    05/06/24 1036   BP: 131/86   Pulse: 70     Physical Exam   Constitutional: She is oriented to person, place, and time. She appears well-developed.   HENT:   Head: Normocephalic.   Mouth/Throat: Cracking in lip commissure   Pulmonary/Chest: Effort normal.   Musculoskeletal:      Cervical back: Neck supple.      Comments: Cspine FROM no tenderness  Tspine FROM no tenderness  Lspine FROM no tenderness.  Shoulders: FROM; no synovitis;  Elbows: FROM; no synovitis; no tophi or nodules  Wrists: FROM; no synovitis;    MCPs: FROM; no synovitis;   PIPs:FROM; no synovitis;   DIPs: FROM; no synovitis;   HIPS: FROM  Knees: FROM; no synovitis; no instability  Ankles: FROM: no synovitis   Toes: no tenderness on palpation.     Lymphadenopathy:     She has no cervical adenopathy.   Neurological: She is alert and oriented to person, place, and time.   Skin: No rash noted.   No skin rashes   Vitals reviewed.      Reviewed old and all outside pertinent medical records available.    All lab results personally reviewed and interpreted by me.  Lab Results   Component Value Date    WBC 9.62 09/05/2023    HGB 14.2 09/05/2023    HCT 41.0 09/05/2023    MCV 89 09/05/2023    MCH 30.9 09/05/2023    MCHC 34.6 09/05/2023    RDW 12.1 09/05/2023     09/05/2023    MPV  "12.4 09/05/2023       Lab Results   Component Value Date     12/13/2023    K 4.0 12/13/2023     12/13/2023    CO2 25 12/13/2023    GLU 57 (L) 12/13/2023    BUN 16 12/13/2023    CALCIUM 9.2 12/13/2023    PROT 7.2 09/05/2023    ALBUMIN 4.5 09/05/2023    BILITOT 0.2 09/05/2023    AST 13 09/05/2023    ALKPHOS 65 09/05/2023    ALT 8 (L) 09/05/2023       Lab Results   Component Value Date    COLORU Yellow 09/05/2023    APPEARANCEUA Clear 09/05/2023    SPECGRAV >1.030 (A) 09/05/2023    PHUR 7.0 09/05/2023    PROTEINUA Trace (A) 09/05/2023    KETONESU Negative 09/05/2023    LEUKOCYTESUR 1+ (A) 09/05/2023    NITRITE Negative 09/05/2023    UROBILINOGEN Negative 09/05/2023       Lab Results   Component Value Date    CRP 0.1 09/15/2023       Lab Results   Component Value Date    RF <13.0 12/13/2023    SEDRATE <2 09/15/2023    CCPANTIBODIE 0.7 12/13/2023       No components found for: "25OHVITDTOT", "55CYCIKV6", "12WYLPGA6", "METHODNOTE"    No results found for: "URICACID"    Lab Results   Component Value Date    HEPCAB Non-reactive 04/24/2023       Imaging:  All imaging reviewed and independently interpreted by me.         ASSESSMENT / PLAN:     Kiya Renae is a 24 y.o. White female with:    1. Psoriatic arthritis  - stable  - continue skyrizi at the IBD dose per GI.   - if worsening, can consider Moy or IL-12/23, will need to coordinate with GI Would avoid TNF given neurological symptoms for now. Avoid methotrexate due to prior nausea.    2. Drug-induced immunodeficiency  - recent labs reviewed  - no live vaccines  - vaccines per guidelines   - immunosuppression/infectious precautions reinforced      3. Crohn's disease without complication, unspecified gastrointestinal tract location  - appears to be stable on skyrizi every 8 weeks   - noted episode of dry blood in the stool at week 7 but manegeable.   - already following with GI.     4. Tremor  - Unknown etiology. Workup per neurology unrevealing. No " evidence of MS, which is patient's biggest concern. MELYSSA negative. Extensive antibody testing has been negative with exception of borderline anti histone which can be positive due to prior use of minocycline.   - controlled on propanolol   - neuro recommended endo follow up due to high TSH   - will have yearly MRIs   - defer management to neurology     Follow up in about 3 months (around 8/6/2024).    Method of contact with patient concerns: [facility]    Disclaimer:  This note is prepared using voice recognition software and as such is likely to have errors and has not been proof read. Please contact me for questions.     Time spent: 20 minutes in face to face discussion concerning diagnosis, prognosis, review of lab and test results, benefits of treatment as well as management of disease, counseling of patient and coordination of care between various health care providers.  Greater than half the time spent was used for coordination of care and counseling of patient.    Trice Coombs M.D.  Rheumatology  [facility]

## 2024-05-09 ENCOUNTER — TELEPHONE (OUTPATIENT)
Dept: GASTROENTEROLOGY | Facility: CLINIC | Age: 24
End: 2024-05-09
Payer: COMMERCIAL

## 2024-05-09 DIAGNOSIS — K59.00 CONSTIPATION, UNSPECIFIED CONSTIPATION TYPE: Primary | ICD-10-CM

## 2024-05-09 RX ORDER — POLYETHYLENE GLYCOL 3350 17 G/17G
17 POWDER, FOR SOLUTION ORAL DAILY
Qty: 510 G | Refills: 2 | Status: SHIPPED | OUTPATIENT
Start: 2024-05-09

## 2024-05-09 NOTE — TELEPHONE ENCOUNTER
Please call to inform & review the results with the patient- radiology report of the CT enterography scan of the abdomen and pelvis showed thickening of parts of the small intestines, which is suggestive for nonspecific small bowel inflammation.  Recommend scheduling EGD to further evaluation of this finding.    Large amount of stool throughout the colon, which suggests constipation. Recommend taking OTC Miralax (aka glycolax sent to pharmacy on file) once daily for constipation.    Otherwise unremarkable findings.    Continue with previous recommendations. If no improvement in symptoms or symptoms worsen, call/follow-up at clinic or go to ER.    Thanks,

## 2024-05-09 NOTE — TELEPHONE ENCOUNTER
Attempted to contact patient to inform of change in appointment with Michelle Valadez NP on 6/4  Left message.

## 2024-05-17 NOTE — TELEPHONE ENCOUNTER
Left message for patient to return call, it looks like she is following up on the [place] [facility] with Gastro.

## 2024-05-21 ENCOUNTER — HOSPITAL ENCOUNTER (EMERGENCY)
Facility: HOSPITAL | Age: 24
Discharge: HOME | End: 2024-05-21
Attending: STUDENT IN AN ORGANIZED HEALTH CARE EDUCATION/TRAINING PROGRAM
Payer: COMMERCIAL

## 2024-05-21 VITALS
BODY MASS INDEX: 23.49 KG/M2 | HEIGHT: 68 IN | DIASTOLIC BLOOD PRESSURE: 83 MMHG | SYSTOLIC BLOOD PRESSURE: 113 MMHG | RESPIRATION RATE: 18 BRPM | OXYGEN SATURATION: 99 % | TEMPERATURE: 98 F | HEART RATE: 70 BPM | WEIGHT: 155 LBS

## 2024-05-21 DIAGNOSIS — F07.81 POST CONCUSSION SYNDROME: Primary | ICD-10-CM

## 2024-05-21 LAB
B-HCG UR QL: NEGATIVE
CTP QC/QA: YES

## 2024-05-21 PROCEDURE — 99285 EMERGENCY DEPT VISIT HI MDM: CPT | Mod: 25

## 2024-05-21 PROCEDURE — 81025 URINE PREGNANCY TEST: CPT | Performed by: STUDENT IN AN ORGANIZED HEALTH CARE EDUCATION/TRAINING PROGRAM

## 2024-05-21 PROCEDURE — 96372 THER/PROPH/DIAG INJ SC/IM: CPT | Performed by: STUDENT IN AN ORGANIZED HEALTH CARE EDUCATION/TRAINING PROGRAM

## 2024-05-21 PROCEDURE — 25000003 PHARM REV CODE 250: Performed by: PHYSICIAN ASSISTANT

## 2024-05-21 PROCEDURE — 63600175 PHARM REV CODE 636 W HCPCS: Performed by: STUDENT IN AN ORGANIZED HEALTH CARE EDUCATION/TRAINING PROGRAM

## 2024-05-21 RX ORDER — METOCLOPRAMIDE 10 MG/1
10 TABLET ORAL EVERY 6 HOURS PRN
Qty: 20 TABLET | Refills: 0 | Status: SHIPPED | OUTPATIENT
Start: 2024-05-21

## 2024-05-21 RX ORDER — BUTALBITAL, ACETAMINOPHEN AND CAFFEINE 50; 325; 40 MG/1; MG/1; MG/1
1 TABLET ORAL EVERY 6 HOURS PRN
Qty: 20 TABLET | Refills: 0 | Status: SHIPPED | OUTPATIENT
Start: 2024-05-21 | End: 2024-06-10

## 2024-05-21 RX ORDER — DROPERIDOL 2.5 MG/ML
1.25 INJECTION, SOLUTION INTRAMUSCULAR; INTRAVENOUS ONCE
Status: COMPLETED | OUTPATIENT
Start: 2024-05-21 | End: 2024-05-21

## 2024-05-21 RX ORDER — KETOROLAC TROMETHAMINE 10 MG/1
10 TABLET, FILM COATED ORAL EVERY 8 HOURS
Qty: 15 TABLET | Refills: 0 | Status: SHIPPED | OUTPATIENT
Start: 2024-05-21 | End: 2024-06-10

## 2024-05-21 RX ORDER — ONDANSETRON 4 MG/1
4 TABLET, ORALLY DISINTEGRATING ORAL
Status: COMPLETED | OUTPATIENT
Start: 2024-05-21 | End: 2024-05-21

## 2024-05-21 RX ADMIN — DROPERIDOL 1.25 MG: 2.5 INJECTION, SOLUTION INTRAMUSCULAR; INTRAVENOUS at 10:05

## 2024-05-21 RX ADMIN — ONDANSETRON 4 MG: 4 TABLET, ORALLY DISINTEGRATING ORAL at 08:05

## 2024-05-21 NOTE — Clinical Note
"Kiya Pimenteldarya Renae was seen and treated in our emergency department on 5/21/2024.  She may return to work on 05/23/2024.       If you have any questions or concerns, please don't hesitate to call.      Diogenes Jiang MD"

## 2024-05-22 NOTE — ED TRIAGE NOTES
Accidentally got kicked in head during martial arts practice last night while wearing foam head protection. States momentary LOC with c/o nausea since incident. Today with headache, photophobia, and vomiting. Took Advil and Zofran with minimal relief. Presents in no distress.

## 2024-05-22 NOTE — FIRST PROVIDER EVALUATION
Emergency Department TeleTriage Encounter Note      CHIEF COMPLAINT    Chief Complaint   Patient presents with    Loss of Consciousness     Kicked in the head during taekwondo yesterday. Brief LOC. Since then having nausea and vomiting, HA, photosensitivity, blurred vision.       VITAL SIGNS   Initial Vitals [05/21/24 1937]   BP Pulse Resp Temp SpO2   133/79 (!) 51 14 98.3 °F (36.8 °C) 100 %      MAP       --            ALLERGIES    Review of patient's allergies indicates:   Allergen Reactions    Azithromycin Hives, Rash and Swelling       PROVIDER TRIAGE NOTE  This is a teletriage evaluation of a 24 y.o. female presenting to the ED complaining of loss of consciousness yesterday evening when kicked in head at taekwondo.  Reports she has had several episodes of vomiting since and worsening pain and photosentivitiy.  Reports she has had several concussions in the past, but she has never had this severe pain.     Initial orders will be placed and care will be transferred to an alternate provider when patient is roomed for a full evaluation. Any additional orders and the final disposition will be determined by that provider.           ORDERS  Labs Reviewed - No data to display    ED Orders (720h ago, onward)      Start Ordered     Status Ordering Provider    05/21/24 2030 05/21/24 2023  ondansetron disintegrating tablet 4 mg  ED 1 Time         Ordered ADELA BATISTA    05/21/24 2023 05/21/24 2023  CT Head Without Contrast  1 time imaging         Ordered ADELA BATISTA              Virtual Visit Note: The provider triage portion of this emergency department evaluation and documentation was performed via [employer], a HIPAA-compliant telemedicine application, in concert with a tele-presenter in the room. A face to face patient evaluation with one of my colleagues will occur once the patient is placed in an emergency department room.      DISCLAIMER: This note was prepared with M*Modal voice  recognition transcription software. Garbled syntax, mangled pronouns, and other bizarre constructions may be attributed to that software system.

## 2024-05-23 NOTE — ED PROVIDER NOTES
ED Provider Note - 5/21/2024    History     Chief Complaint   Patient presents with    Loss of Consciousness     Kicked in the head during taekwondo yesterday. Brief LOC. Since then having nausea and vomiting, HA, photosensitivity, blurred vision.       HPI     Kiya Renae is a 24 y.o. year old female with past medical and surgical history as seen below, presenting with chief complaint of   Nausea, vomiting, photosensitivity since multiple head traumas yesterday.  States history of 7-8 concussions between college sports and taekwondo.  Was hit in the head multiple times during session yesterday with brief loss of consciousness.        Past Medical History:   Diagnosis Date    Allergy     Anxiety 07/31/2021    Arthritis 12/30/2021    PsA    Joint pain     Lyme disease, unspecified     Psoriasis     Skin disease 12/30/2021    psorias on scalp, behind ears, and inside ear canal    Subclinical hypothyroidism 07/05/2023     Past Surgical History:   Procedure Laterality Date    COLONOSCOPY N/A 08/22/2023    Procedure: COLONOSCOPY;  Surgeon: Stanton Ferrell MD;  Location: [facility];  Service: Endoscopy;  Laterality: N/A;    UPPER GASTROINTESTINAL ENDOSCOPY  05/2023    in [place], minor swelling to lower esophagus per patient report         Family History   Problem Relation Name Age of Onset    Alcohol abuse Mother Olgaedinson Renae     Asthma Mother Olgaedinson Renae     Rheum arthritis Mother Olga Renae     Psoriasis Mother Olga Renae     Alcohol abuse Father Adrián Renae Jr     Migraines Father Adrián Renae Jr     Acne Father Adrián Renae Jr     Depression Brother Omari Renae     Suicidality Brother Omari Renae     Eczema Brother Omari Renae     Crohn's disease Paternal Aunt Jennifer Cranor     Breast cancer Paternal Aunt Jennifer Cranor     Lupus Paternal Aunt Jennifer Cranor     Migraines Paternal Aunt Jennifer Cranor     Diabetes Maternal Grandmother      Heart failure Maternal Grandfather      Breast cancer Paternal Grandmother   "    Heart attack Paternal Grandmother      Colon cancer Neg Hx      Esophageal cancer Neg Hx      Stomach cancer Neg Hx       Social History     Tobacco Use    Smoking status: Never     Passive exposure: Never    Smokeless tobacco: Never   Substance Use Topics    Alcohol use: Not Currently     Comment: only socially and a very small amount if ever (one or two)    Drug use: Never     Social Determinants of Health with Concerns     Stress: Stress Concern Present (11/10/2023)    [employer] [place] [employer] - Occupational Stress Questionnaire     Feeling of Stress : To some extent   Housing Stability: Unknown (11/10/2023)    Housing Stability Vital Sign     Unable to Pay for Housing in the Last Year: No     Number of Places Lived in the Last Year: Not on file     Unstable Housing in the Last Year: No   Utilities: Not on file   Health Literacy: Not on file   Social Isolation: Not on file      Review of patient's allergies indicates:   Allergen Reactions    Azithromycin Hives, Rash and Swelling       Review of Systems     A full Review of Systems (ROS) was performed and was negative unless otherwise stated in the HPI.      Physical Exam     Vitals:    05/21/24 1937 05/21/24 2312   BP: 133/79 113/83   BP Location: Right arm Left arm   Patient Position: Sitting Sitting   Pulse: (!) 51 70   Resp: 14 18   Temp: 98.3 °F (36.8 °C)    SpO2: 100% 99%   Weight: 70.3 kg (155 lb)    Height: 5' 8" (1.727 m)         Physical Exam    Nursing note and vitals reviewed.  Constitutional: She appears well-developed and well-nourished.   HENT:   Head: Normocephalic and atraumatic.   Right Ear: External ear normal.   Left Ear: External ear normal.   Nose: Nose normal.   Eyes: Conjunctivae and EOM are normal. Pupils are equal, round, and reactive to light.   Neck: No tracheal deviation present.   Normal range of motion.  Cardiovascular:  Normal rate and regular rhythm.           Pulmonary/Chest: No respiratory distress. She has no " wheezes.   Musculoskeletal:         General: No edema. Normal range of motion.      Cervical back: Normal range of motion.     Neurological: She is alert and oriented to person, place, and time. She has normal strength. No cranial nerve deficit or sensory deficit. Gait normal.   Skin: Skin is warm and dry.   Psychiatric: She has a normal mood and affect. Her behavior is normal. Thought content normal.         Lab Results- Independently reviewed by myself      Labs Reviewed   POCT URINE PREGNANCY           Imaging     Imaging Results              CT Head Without Contrast (Final result)  Result time 05/21/24 22:57:02      Final result by Suleman Jeffrey MD (05/21/24 22:57:02)                   Impression:      Negative CT of the brain.      Electronically signed by: Suleman Jeffrey  Date:    05/21/2024  Time:    22:57               Narrative:    EXAMINATION:  CT HEAD WITHOUT CONTRAST    CLINICAL HISTORY:  Head trauma, moderate-severe;    TECHNIQUE:  Low dose axial images were obtained through the head.  Coronal and sagittal reformations were also performed. Contrast was not administered.    COMPARISON:  MRI of the brain, 11/15/2023.    FINDINGS:  BRAIN:    Brain parenchyma appears normal in attenuation with normal gray-white matter differentiation.  No mass, mass effect or pathologic fluid collection.    Ventricles and basilar cisterns appear appropriate.    Calvarium intact.  Sinuses are clear with no significant middle ear or mastoid opacity.  Orbits and orbital contents unremarkable.                                    X-Rays:   Independently Interpreted Readings:   Head CT: No hemorrhage.  No skull fracture.               ED Course         Procedures         Orders Placed This Encounter    CT Head Without Contrast    Ambulatory referral/consult to Concussion Management Program    POCT urine pregnancy    ondansetron disintegrating tablet 4 mg    droPERidol injection 1.25 mg    butalbital-acetaminophen-caffeine  -40 mg (FIORICET, ESGIC) -40 mg per tablet    ketorolac (TORADOL) 10 mg tablet    metoclopramide HCl (REGLAN) 10 MG tablet                      Medical Decision Making       The patient's list of active medical problems, social history, medications, and allergies as documented per RN staff has been reviewed.           Medical Decision Making  This is a 24 y.o. female, presenting with head trauma from blunt trauma.  Patient's neurological exam was non-focal and unremarkable.      [place] Head CT Rule was applied and patient did not fall into the low risk category so a head CT was obtained.  This showed no significant findings.      At this time, it is felt that the most likely explanation for the patient's symptoms is concussion.   I also considered SAH, SDH, Epidural Hematoma, IPH, skull fracture, migraine but this appears less likely considering the data gathered thus far.       Patient remained stable and neurologically intact while in the emergency department.  Discussed warning signs that would prompt return to ED.      No sports or strenuous activity until symptoms free.  Return to emergency department urgently if new or worsening symptoms develop.          Amount and/or Complexity of Data Reviewed  Labs: ordered.    Risk  Prescription drug management.                    ED Prescriptions       Medication Sig Dispense Start Date End Date Auth. Provider    butalbital-acetaminophen-caffeine -40 mg (FIORICET, ESGIC) -40 mg per tablet Take 1 tablet by mouth every 6 (six) hours as needed. 20 tablet 5/21/2024 6/20/2024 Diogenes Jiang MD    ketorolac (TORADOL) 10 mg tablet Take 1 tablet (10 mg total) by mouth every 8 (eight) hours. for 5 days 15 tablet 5/21/2024 5/26/2024 Diogenes Jiang MD    metoclopramide HCl (REGLAN) 10 MG tablet Take 1 tablet (10 mg total) by mouth every 6 (six) hours as needed (Nausea and vomiting). 20 tablet 5/21/2024 -- Diogenes Jiang MD              Clinical  Impression       Follow-up Information       Follow up With Specialties Details Why Contact Info    [facility] -  Schedule an appointment as soon as possible for a visit in 1 week For follow-up on today's visit. [address]  [facility] [address]  [phone]      [facility] Emergency Dept Emergency Medicine Go to  As needed, If symptoms worsen [address]  [facility] [address] [phone]            Referrals:  Orders Placed This Encounter   Procedures    Ambulatory referral/consult to Concussion Management Program     Standing Status:   Future     Standing Expiration Date:   6/21/2025     Referral Priority:   Routine     Referral Type:   Consultation     Referral Reason:   Specialty Services Required     Requested Specialty:   Neurology     Number of Visits Requested:   1       Disposition   ED Disposition Condition    Discharge Stable            Diagnosis    ICD-10-CM ICD-9-CM   1. Post concussion syndrome  F07.81 310.2           Diogenes Jiang MD        05/22/2024          DISCLAIMER: This note was prepared with [employer] voice recognition transcription software. Garbled syntax, mangled pronouns, and other bizarre constructions may be attributed to that software system.       Diogenes Jiang MD  05/22/24 7813

## 2024-05-31 ENCOUNTER — OFFICE VISIT (OUTPATIENT)
Dept: DERMATOLOGY | Facility: CLINIC | Age: 24
End: 2024-05-31
Payer: COMMERCIAL

## 2024-05-31 DIAGNOSIS — L70.0 ACNE VULGARIS: ICD-10-CM

## 2024-05-31 DIAGNOSIS — L40.0 PLAQUE PSORIASIS: Primary | ICD-10-CM

## 2024-05-31 PROCEDURE — 99213 OFFICE O/P EST LOW 20 MIN: CPT | Mod: 25,S$GLB,, | Performed by: STUDENT IN AN ORGANIZED HEALTH CARE EDUCATION/TRAINING PROGRAM

## 2024-05-31 PROCEDURE — 1159F MED LIST DOCD IN RCRD: CPT | Mod: CPTII,S$GLB,, | Performed by: STUDENT IN AN ORGANIZED HEALTH CARE EDUCATION/TRAINING PROGRAM

## 2024-05-31 PROCEDURE — 99999 PR PBB SHADOW E&M-EST. PATIENT-LVL II: CPT | Mod: PBBFAC,,, | Performed by: STUDENT IN AN ORGANIZED HEALTH CARE EDUCATION/TRAINING PROGRAM

## 2024-05-31 PROCEDURE — 1160F RVW MEDS BY RX/DR IN RCRD: CPT | Mod: CPTII,S$GLB,, | Performed by: STUDENT IN AN ORGANIZED HEALTH CARE EDUCATION/TRAINING PROGRAM

## 2024-05-31 PROCEDURE — 11900 INJECT SKIN LESIONS </W 7: CPT | Mod: S$GLB,,, | Performed by: STUDENT IN AN ORGANIZED HEALTH CARE EDUCATION/TRAINING PROGRAM

## 2024-05-31 NOTE — PROGRESS NOTES
"  Subjective:      Patient ID:  Kiya Renae is a 24 y.o. female who presents for   Chief Complaint   Patient presents with    Acne    Psoriasis     Acne - Follow-up  Symptom course: improving  Currently using: BPO cleanser as needed and sal acid cleanser qpm and alternating tretinoin qpm with glycolic acid.  Affected locations: face  Signs / symptoms: redness    Psoriasis - Follow-up  Symptom course: improving  Affected locations: scalp, right ear and left ear  Signs / symptoms: dryness and itching    For acne, she is using BPO 4% cleanser and sal acid cleanser. At night she uses gentle cleanser, tretinoin 0.05%, and niacinamide.Overall her acne is well controlled    Her main concern is that there are two persistent areas that always come back in the same location on left temple and right temple       Problem List Items Addressed This Visit          Derm    Plaque psoriasis - Primary    Overview     - overall her skin is well controlled on skyrizi, was flaring prior to shot being due. However she was diagnosed with crohns end of 2023. Dose of skyrizi was increased and now psoriasis is well controlled  - topically, she is using fluocinolone oil in the ears and clobetasol solution for her scalp, which is helping  - Elidel was not covered    - Well controlled today     Initial history:  - Has a diagnosis of psoriasis since at least 2021 but has a history of "dandruff" since middle school  - Only skin involvement has been scalp and ears  - It improved on skyrizi but still has some dandruff / scalp psoriasis and itching that flares right before next shot is due  - Use tea tree oil which she feels helps  - has psoriatic arthritis and is on skyrizi per rheumatology since 11/2022. Doing well          Other Visit Diagnoses       Acne vulgaris        Relevant Medications    triamcinolone acetonide injection 10 mg (Start on 5/31/2024 10:45 AM)    Other Relevant Orders    IA [id number] ACETONIDE INJ PER 10MG    IA " INJECTION INTO SKIN LESIONS, UP TO 7              Review of Systems   Hematologic/Lymphatic: Does not bruise/bleed easily.       Objective:   Physical Exam   Constitutional: She appears well-developed and well-nourished. No distress.   Neurological: She is alert and oriented to person, place, and time. She is not disoriented.   Psychiatric: She has a normal mood and affect.   Skin:   Areas Examined (abnormalities noted in diagram):   Head / Face Inspection Performed            Diagram Legend     Erythematous scaling macule/papule c/w actinic keratosis       Vascular papule c/w angioma      Pigmented verrucoid papule/plaque c/w seborrheic keratosis      Yellow umbilicated papule c/w sebaceous hyperplasia      Irregularly shaped tan macule c/w lentigo     1-2 mm smooth white papules consistent with Milia      Movable subcutaneous cyst with punctum c/w epidermal inclusion cyst      Subcutaneous movable cyst c/w pilar cyst      Firm pink to brown papule c/w dermatofibroma      Pedunculated fleshy papule(s) c/w skin tag(s)      Evenly pigmented macule c/w junctional nevus     Mildly variegated pigmented, slightly irregular-bordered macule c/w mildly atypical nevus      Flesh colored to evenly pigmented papule c/w intradermal nevus       Pink pearly papule/plaque c/w basal cell carcinoma      Erythematous hyperkeratotic cursted plaque c/w SCC      Surgical scar with no sign of skin cancer recurrence      Open and closed comedones      Inflammatory papules and pustules      Verrucoid papule consistent consistent with wart     Erythematous eczematous patches and plaques     Dystrophic onycholytic nail with subungual debris c/w onychomycosis     Umbilicated papule    Erythematous-base heme-crusted tan verrucoid plaque consistent with inflamed seborrheic keratosis     Erythematous Silvery Scaling Plaque c/w Psoriasis     See annotation      Assessment / Plan:          Plaque psoriasis--only scalp involvement + PsA. Exam clear  today but patient reports itching on scalp and in ears--improved with clobetasol for scalp and fluocinolone oil for ears. Now better controleld on higher dose of skyrizi  - on skyrizi prescribed by rheum--continue  --C/w topicals as below  -     clobetasoL (TEMOVATE) 0.05 % external solution; Apply topically once daily. Use to affected areas for up to 2 weeks then take a 1 week break or decrease to 3 times weekly. Do not apply to groin or face  Dispense: 50 mL; Refill: 2  -     fluocinolone acetonide oiL 0.01 % Drop; Place 1 application in ear(s) once daily. Use to affected areas for up to 2 weeks then take a 1 week break or decrease to 3 times weekly. For use in ears  Dispense: 20 mL; Refill: 2    Acne vulgaris  -     triamcinolone acetonide injection 10 mg  -     OH [id number] ACETONIDE INJ PER 10MG  -     OH INJECTION INTO SKIN LESIONS, UP TO 7    - Overall well controlled but still getting breakouts b/l temples / cheeks  - For face--BPO 4% cleanser, sal acid cleanser qPM and tretinoin qpm. Counseled on use of retinoids and handout provided  - Discussed adding in another topical (eg, amzeeq, dapsone, winlevi, etc) but patient would like to continue with just tretinoin for now due to cost of the creams  - discussed other treatment options including spironolactone and doxy. Patient would like to use only topicals for now    Main concern today was 2-3 areas of persistent acne lesions. Treated with ILK as below. Counseled on risk of atrophy    Intralesional Kenalog 2.5mg/cc (1.25 cc total) injected into 4 lesions on the b/l cheeks today after obtaining verbal consent including risk of surrounding hypopigmentation. Patient tolerated procedure well.    Units: 1  NDC for Kenalog 10mg/cc:  [id number]                No follow-ups on file.

## 2024-06-04 ENCOUNTER — OFFICE VISIT (OUTPATIENT)
Dept: GASTROENTEROLOGY | Facility: CLINIC | Age: 24
End: 2024-06-04
Payer: COMMERCIAL

## 2024-06-04 ENCOUNTER — TELEPHONE (OUTPATIENT)
Dept: ENDOSCOPY | Facility: HOSPITAL | Age: 24
End: 2024-06-04
Payer: COMMERCIAL

## 2024-06-04 ENCOUNTER — LAB VISIT (OUTPATIENT)
Dept: LAB | Facility: HOSPITAL | Age: 24
End: 2024-06-04
Payer: COMMERCIAL

## 2024-06-04 DIAGNOSIS — K50.111 CROHN'S DISEASE OF COLON WITH RECTAL BLEEDING: ICD-10-CM

## 2024-06-04 DIAGNOSIS — K50.111 CROHN'S DISEASE OF COLON WITH RECTAL BLEEDING: Primary | ICD-10-CM

## 2024-06-04 DIAGNOSIS — R11.2 NON-INTRACTABLE VOMITING WITH NAUSEA: ICD-10-CM

## 2024-06-04 DIAGNOSIS — K92.1 HEMATOCHEZIA: ICD-10-CM

## 2024-06-04 DIAGNOSIS — K62.5 RECTAL BLEEDING: Primary | ICD-10-CM

## 2024-06-04 DIAGNOSIS — R93.3 ABNORMAL FINDING ON GI TRACT IMAGING: ICD-10-CM

## 2024-06-04 DIAGNOSIS — Z12.11 SCREEN FOR COLON CANCER: Primary | ICD-10-CM

## 2024-06-04 DIAGNOSIS — R19.8 RECTAL TENESMUS: ICD-10-CM

## 2024-06-04 LAB
25(OH)D3+25(OH)D2 SERPL-MCNC: 26 NG/ML (ref 30–96)
ALBUMIN SERPL BCP-MCNC: 4.2 G/DL (ref 3.5–5.2)
ALBUMIN SERPL BCP-MCNC: 4.2 G/DL (ref 3.5–5.2)
ALP SERPL-CCNC: 54 U/L (ref 55–135)
ALP SERPL-CCNC: 54 U/L (ref 55–135)
ALT SERPL W/O P-5'-P-CCNC: 13 U/L (ref 10–44)
ALT SERPL W/O P-5'-P-CCNC: 13 U/L (ref 10–44)
ANION GAP SERPL CALC-SCNC: 7 MMOL/L (ref 8–16)
AST SERPL-CCNC: 15 U/L (ref 10–40)
AST SERPL-CCNC: 15 U/L (ref 10–40)
BASOPHILS # BLD AUTO: 0.03 K/UL (ref 0–0.2)
BASOPHILS NFR BLD: 0.5 % (ref 0–1.9)
BILIRUB DIRECT SERPL-MCNC: 0.1 MG/DL (ref 0.1–0.3)
BILIRUB SERPL-MCNC: 0.3 MG/DL (ref 0.1–1)
BILIRUB SERPL-MCNC: 0.3 MG/DL (ref 0.1–1)
BUN SERPL-MCNC: 20 MG/DL (ref 6–20)
CALCIUM SERPL-MCNC: 9.7 MG/DL (ref 8.7–10.5)
CHLORIDE SERPL-SCNC: 106 MMOL/L (ref 95–110)
CO2 SERPL-SCNC: 27 MMOL/L (ref 23–29)
CREAT SERPL-MCNC: 1 MG/DL (ref 0.5–1.4)
CRP SERPL-MCNC: <0.3 MG/L (ref 0–8.2)
DIFFERENTIAL METHOD BLD: NORMAL
EOSINOPHIL # BLD AUTO: 0.2 K/UL (ref 0–0.5)
EOSINOPHIL NFR BLD: 2.9 % (ref 0–8)
ERYTHROCYTE [DISTWIDTH] IN BLOOD BY AUTOMATED COUNT: 11.9 % (ref 11.5–14.5)
EST. GFR  (NO RACE VARIABLE): >60 ML/MIN/1.73 M^2
GLUCOSE SERPL-MCNC: 77 MG/DL (ref 70–110)
HBV CORE AB SERPL QL IA: NORMAL
HBV SURFACE AG SERPL QL IA: NORMAL
HCT VFR BLD AUTO: 39.2 % (ref 37–48.5)
HGB BLD-MCNC: 13 G/DL (ref 12–16)
IMM GRANULOCYTES # BLD AUTO: 0.02 K/UL (ref 0–0.04)
IMM GRANULOCYTES NFR BLD AUTO: 0.3 % (ref 0–0.5)
LYMPHOCYTES # BLD AUTO: 1.3 K/UL (ref 1–4.8)
LYMPHOCYTES NFR BLD: 20.4 % (ref 18–48)
MCH RBC QN AUTO: 30.9 PG (ref 27–31)
MCHC RBC AUTO-ENTMCNC: 33.2 G/DL (ref 32–36)
MCV RBC AUTO: 93 FL (ref 82–98)
MONOCYTES # BLD AUTO: 0.7 K/UL (ref 0.3–1)
MONOCYTES NFR BLD: 10.8 % (ref 4–15)
NEUTROPHILS # BLD AUTO: 4.1 K/UL (ref 1.8–7.7)
NEUTROPHILS NFR BLD: 65.1 % (ref 38–73)
NRBC BLD-RTO: 0 /100 WBC
PLATELET # BLD AUTO: 212 K/UL (ref 150–450)
PMV BLD AUTO: 12.9 FL (ref 9.2–12.9)
POTASSIUM SERPL-SCNC: 4.4 MMOL/L (ref 3.5–5.1)
PROT SERPL-MCNC: 7.1 G/DL (ref 6–8.4)
PROT SERPL-MCNC: 7.1 G/DL (ref 6–8.4)
RBC # BLD AUTO: 4.21 M/UL (ref 4–5.4)
SODIUM SERPL-SCNC: 140 MMOL/L (ref 136–145)
VIT B12 SERPL-MCNC: 956 PG/ML (ref 210–950)
WBC # BLD AUTO: 6.22 K/UL (ref 3.9–12.7)

## 2024-06-04 PROCEDURE — 86140 C-REACTIVE PROTEIN: CPT

## 2024-06-04 PROCEDURE — 86480 TB TEST CELL IMMUN MEASURE: CPT

## 2024-06-04 PROCEDURE — 87340 HEPATITIS B SURFACE AG IA: CPT

## 2024-06-04 PROCEDURE — 80053 COMPREHEN METABOLIC PANEL: CPT

## 2024-06-04 PROCEDURE — 99214 OFFICE O/P EST MOD 30 MIN: CPT | Mod: S$GLB,,,

## 2024-06-04 PROCEDURE — 82306 VITAMIN D 25 HYDROXY: CPT

## 2024-06-04 PROCEDURE — 99999 PR PBB SHADOW E&M-EST. PATIENT-LVL I: CPT | Mod: PBBFAC,,,

## 2024-06-04 PROCEDURE — 85025 COMPLETE CBC W/AUTO DIFF WBC: CPT

## 2024-06-04 PROCEDURE — 82607 VITAMIN B-12: CPT

## 2024-06-04 PROCEDURE — 86706 HEP B SURFACE ANTIBODY: CPT

## 2024-06-04 PROCEDURE — 86704 HEP B CORE ANTIBODY TOTAL: CPT

## 2024-06-04 NOTE — TELEPHONE ENCOUNTER
"----- Message from Michelle Valadez NP sent at 6/4/2024  1:45 PM CDT -----  Regarding: EGD/Colonoscopy  Procedure: EGD/Colonoscopy    Diagnosis: Abnormal finding on GI tract imaging and hx of Crohn's (on Skyrizi), rectal bleeding    Procedure Timing: Within 4 weeks (Urgent)    #If within 4 weeks selected, please ilana as high priority#    #If greater than 12 weeks, please select "5-12 weeks" and delay sending until 3 months prior to requested date#     [place] [facility] [place] [facility] [place]Dr. JACOB Yoon or Dr. Gracia)    Additional Scheduling Information: No scheduling concerns    Prep Specifications:Standard prep    Is the patient taking a GLP-1 Agonist:no    Have you attached a patient to this message: yes  "

## 2024-06-04 NOTE — TELEPHONE ENCOUNTER
Spoke to Kiya to schedule procedure(s) Colonoscopy/EGD       Physician to perform procedure(s) Dr. JACOB Yoon  Date of Procedure (s) 6/25/24  Arrival Time 12:00 PM  Time of Procedure(s) 1:00 PM   Location of Procedure(s) [facility] 4th Floor  Type of Rx Prep sent to patient: PEG  Instructions provided to patient via Copy in hand    Patient was informed on the following information and verbalized understanding. Screening questionnaire reviewed with patient and complete. If procedure requires anesthesia, a responsible adult needs to be present to accompany the patient home, patient cannot drive after receiving anesthesia. Appointment details are tentative, especially check-in time. Patient will receive a prep-op call 7 days prior to confirm check-in time for procedure. If applicable the patient should contact their pharmacy to verify Rx for procedure prep is ready for pick-up. Patient was advised to call the scheduling department at [phone] if pharmacy states no Rx is available. Patient was advised to call the endoscopy scheduling department if any questions or concerns arise.      [facility] Scheduling Department

## 2024-06-04 NOTE — PROGRESS NOTES
Chief Complaint   Patient presents with    Follow-up     Was improving until last week started having issues    .    HPI:     Kiya Renae is a 24 y.o. female who presents for evaluation of a several month history of nasal congestion. She notes that she has had crusting(blood tinged) and mucoid discharge in her nose. She notes bilateral maxillary sinus pressure.  She has had bilateral ear fullness/pressure. + Tinnitus both low buzz and high pitched sounds. She does have some post-nasal drip and sore throat.  There is bilateral nasal obstruction. She has used Flonase with some relief.  She does not use sinus rinses or nasal sprays.  . She  admits to headaches. She does have history of migraine  She has not had sinus or nasal surgery. There is no history of sinonasal trauma. She is on skyrizi for Chrohn's and psoriatic arthritis.     Interval HPI 6/5/2024:  Follow up visit. Reports that she had been doing better until last week.  She reports that she has been feeling that she is having bilateral crusting in her nose.  She reports that she has been pulling at the crusts with tweezers.  She feels the symptoms are worse on her left side. She has had more nasal congestion/obstruction on this side. She has been using saline rinses and Flonase. She did take the Augmentin course but it did seem to affect her stomach and had a hard time tolerating it.       Past Medical History:   Diagnosis Date    Allergy     Anxiety 07/31/2021    Arthritis 12/30/2021    PsA    Joint pain     Lyme disease, unspecified     Psoriasis     Skin disease 12/30/2021    psorias on scalp, behind ears, and inside ear canal    Subclinical hypothyroidism 07/05/2023     Social History     Socioeconomic History    Marital status: Single   Tobacco Use    Smoking status: Never     Passive exposure: Never    Smokeless tobacco: Never   Substance and Sexual Activity    Alcohol use: Not Currently     Comment: only socially and a very small amount if  ever (one or two)    Drug use: Never    Sexual activity: Never   Other Topics Concern    Are you pregnant or think you may be? No    Breast-feeding No     Social Determinants of Health     Financial Resource Strain: Low Risk  (11/10/2023)    Overall Financial Resource Strain (CARDIA)     Difficulty of Paying Living Expenses: Not very hard   Food Insecurity: No Food Insecurity (11/10/2023)    Hunger Vital Sign     Worried About Running Out of Food in the Last Year: Never true     Ran Out of Food in the Last Year: Never true   Transportation Needs: No Transportation Needs (11/10/2023)    PRAPARE - Transportation     Lack of Transportation (Medical): No     Lack of Transportation (Non-Medical): No   Physical Activity: Sufficiently Active (11/10/2023)    Exercise Vital Sign     Days of Exercise per Week: 6 days     Minutes of Exercise per Session: 60 min   Stress: Stress Concern Present (11/10/2023)    [employer] - Occupational Stress Questionnaire     Feeling of Stress : To some extent   Housing Stability: Unknown (11/10/2023)    Housing Stability Vital Sign     Unable to Pay for Housing in the Last Year: No     Unstable Housing in the Last Year: No     Past Surgical History:   Procedure Laterality Date    COLONOSCOPY N/A 08/22/2023    Procedure: COLONOSCOPY;  Surgeon: Stanton Ferrell MD;  Location: [facility];  Service: Endoscopy;  Laterality: N/A;    UPPER GASTROINTESTINAL ENDOSCOPY  05/2023    in [place], minor swelling to lower esophagus per patient report     Family History   Problem Relation Name Age of Onset    Alcohol abuse Mother Olga Renae     Asthma Mother Olga Renae     Rheum arthritis Mother Olga Renae     Psoriasis Mother Olga Renae     Alcohol abuse Father Adrián Renae Jr     Migraines Father Adrián Renae Jr     Acne Father Adrián Renae Jr     Depression Brother Omari Renae     Suicidality Brother Omari Renae     Eczema Brother Omari Renae     Crohn's disease Paternal Aunt  Jennifer Cranor     Breast cancer Paternal Aunt Jennifer Stovall     Lupus Paternal Aunt Jennifer Stovall     Migraines Paternal Aunt Jennifer Stovall     Diabetes Maternal Grandmother      Heart failure Maternal Grandfather      Breast cancer Paternal Grandmother      Heart attack Paternal Grandmother      Colon cancer Neg Hx      Esophageal cancer Neg Hx      Stomach cancer Neg Hx             Review of Systems  General: negative for chills, fever or weight loss  Psychological: negative for mood changes or depression  Ophthalmic: negative for blurry vision, photophobia or eye pain  ENT: see HPI  Respiratory: no cough, shortness of breath, or wheezing  Cardiovascular: no chest pain or dyspnea on exertion  Gastrointestinal: no abdominal pain, change in bowel habits, or black/ bloody stools  Musculoskeletal: negative for gait disturbance or muscular weakness  Neurological: no syncope or seizures; no ataxia  Dermatological: negative for puritis,  rash and jaundice  Hematologic/lymphatic: no easy bruising, no new lumps or bumps      Physical Exam:    Vitals:    06/05/24 0818   BP: 131/81   Pulse: 63         Constitutional: Well appearing / communicating without difficutly.  NAD.  Eyes: EOM I Bilaterally  Head/Face: Normocephalic.  Negative paranasal sinus pressure/tenderness.  Salivary glands WNL.  House Brackmann I Bilaterally.    Right Ear: Auricle normal appearance. External Auditory Canal within normal limits,TM w/o masses/lesions/perforations. TM mobility noted.   Left Ear: Auricle normal appearance. External Auditory Canal WNL,TM w/o masses/lesions/perforations. TM mobility noted.  Nose: + nasal septal deviation to the left. Inferior Turbinates 3+ bilaterally. No septal perforation. No masses/lesions. External nasal skin appears normal without masses/lesions.  Oral Cavity: Gingiva/lips within normal limits.  Dentition/gingiva healthy appearing. Mucus membranes moist. Floor of mouth soft, no masses palpated. Oral Tongue  mobile. Hard Palate appears normal.    Oropharynx: Base of tongue appears normal. No masses/lesions noted. Tonsillar fossa/pharyngeal wall without lesions. Posterior oropharynx WNL.  Soft palate without masses. Midline uvula.   Neck/Lymphatic: No LAD I-VI bilaterally.  No thyromegaly.  No masses noted on exam.    Mirror laryngoscopy/nasopharyngoscopy: Active gag reflex.  Unable to perform.      Diagnostic studies:   CT head 5/21/2024: images interpreted by today from ENT standpoint and discussed with patient.   Sinuses are clear without significant mucosal thickening; middle ear and mastoid air without opacification. There is deviation of the nasal septum to the left    Outside MRI brain 1/10/2024:  FINDINGS:   There is no intracranial mass, diffusion restriction, parenchymal signal abnormality or pathologic   enhancement.     The ventricles and sulcal pattern are normal.  There are no extra-axial collections.  There is no   evidence of tonsillar ectopia.  Flow voids are preserved within the dominant intracerebral   vasculature, suggestive of vessel patency.     Mastoid air cells are clear.  There is minimal mucosal thickening in both maxillary sinuses and   scattered throughout the ethmoid complexes.  The orbital contents are unremarkable.     Assessment:    ICD-10-CM ICD-9-CM    1. Chronic nonallergic rhinitis  J31.0 472.0 CANCELED: Aspergillus fumagatus IgE      CANCELED: Bermuda grass IgE      CANCELED: Cat epithelium IgE      CANCELED: Cladosporium IgE      CANCELED: Cockroach, American IgE      CANCELED: Noonan, bald IgE      CANCELED: D. farinae IgE      CANCELED: D. pteronyssinus IgE      CANCELED: Dog dander IgE      CANCELED: Plantain, English IgE      CANCELED: Pj grass IgE      CANCELED: Spring elder, rough IgE      CANCELED: Mugwort IgE      CANCELED: Nettle IgE      CANCELED: Oak, white IgE      CANCELED: Penicillium IgE      CANCELED: Ragweed, short, common IgE      CANCELED: Mike IgE       CANCELED: Allergen, Cocklebur      CANCELED: Allergen, Elm [place]      CANCELED: Allergen, Meadow Grass ([place])      CANCELED: Allergen, Mucor Racemosus      CANCELED: Allergen, Pecan Tree IgE      CANCELED: Allergen, White Pedro      CANCELED: Allergen-Alternaria Alternata      CANCELED: Allergen-[place]ingham      CANCELED: Allergen-Common Pigweed      CANCELED: Allergen-Silver Birch      CANCELED: Feather Panel #2      CANCELED: RAST Allergen for [place]      CANCELED: RAST Allergen Maple ([place])      CANCELED: RAST Allergen [place]      CANCELED: RAST Allergen, Lamb's Quarters      CANCELED: RAST Allergen, Sheep [place])      2. Hypertrophy of inferior nasal turbinate  J34.3 478.0       3. Nasal septal deviation  J34.2 470       4. Nasal obstruction  J34.89 478.19           The primary encounter diagnosis was Chronic nonallergic rhinitis. Diagnoses of Hypertrophy of inferior nasal turbinate, Nasal septal deviation, and Nasal obstruction were also pertinent to this visit.      Plan:  No orders of the defined types were placed in this encounter.    Recommend Adrian Med Sinus Rinse BID; distilled water only(maintenance).  Continue Flonase 2 sprays per nostril daiy (spray laterally).  Start Astelin 1 spray per BID  Continue xyzal 5mg PO daily  Obtain immunoCAP testing   Start bactroban to bilateral nostrils BID for 14 days  Obtain immunoCAP testing   Discussed option of septoplasty/SMRT for nasal obstruction. She will consider this option.   Obtain audiogram to evaluate tinnitus    Luzmaria Staples MD

## 2024-06-04 NOTE — TELEPHONE ENCOUNTER
Dr. Yoon and Dr. Gracia,  I received a message from [facility] to schedule patient urgent for egd and colon  Will it be ok If I use the reserve spot on 6/24 or 6/25 with Spencer

## 2024-06-04 NOTE — PROGRESS NOTES
Gastroenterology Clinic Consultation Note    Reason for Visit:  The primary encounter diagnosis was Crohn's disease of colon with rectal bleeding. Diagnoses of Hematochezia, Rectal tenesmus, and Non-intractable vomiting with nausea were also pertinent to this visit.    PCP:   Amjed, Saira [address] [facility] / [place] [id number]      Initial HPI   This is a 24 y.o. female referred from her previous GI provider. She is from [facility], currently in school at [employer] for a Graduate program.  History as follows: Was diagnosed with psoriatic arthritis about 1.5 years ago. Was started on Skyrizi 180mg every 12 weeks for this. She began having worsening GI symptoms including nausea and vomiting, diarrhea and abdominal pain around January of 2023. Would have worsening GI symptoms around weeks 10-11 before her next injection. Saw Dr. Ferrell 8/2023 and Colonoscopy performed. No abnormalities noted on endoscopic evaluation or biopsy. Sent results to her GI MD in [facility] and he had concerns for crohn's given her positive Prometheus panel. Skyrizi was increased to 360mg every 8 weeks, which had provided her the most relief. Saw DAVION Sims NP 4/19/2024 for her hematochezia. CT Enterography was performed that noted small bowel thickening. Referral placed for eval at [facility] due to patient location.     She is presenting for hematochezia, abdominal cramping.  Patient reports that she has been having blood in her stool more frequently. Will see blood with each BM. Moving her bowels regularly.     Patient reports that a ring of blood will surround her bowels. Bright red in color. Moving her bowels regularly. Has had worsening pain with defecation over the past week. More cramping reported. Reports Tenesmus where she will have the urge to go multiple times but unable to move her bowels. She will still experience episodic intractable vomiting about once weekly.      ROS:  Review of Systems   Constitutional:  Negative for chills, fever and weight loss.   HENT:  Negative for sore throat.    Eyes:  Negative for redness.   Gastrointestinal:  Positive for blood in stool. Negative for abdominal pain, constipation, diarrhea, heartburn, melena, nausea and vomiting.   Skin:  Negative for itching and rash.   Neurological:  Negative for dizziness, loss of consciousness and weakness.        Medical History:  has a past medical history of Allergy, Anxiety (07/31/2021), Arthritis (12/30/2021), Joint pain, Lyme disease, unspecified, Psoriasis, Skin disease (12/30/2021), and Subclinical hypothyroidism (07/05/2023).    Surgical History:  has a past surgical history that includes Colonoscopy (N/A, 08/22/2023) and Upper gastrointestinal endoscopy (05/2023).    Family History: family history includes Acne in her father; Alcohol abuse in her father and mother; Asthma in her mother; Breast cancer in her paternal aunt and paternal grandmother; Crohn's disease in her paternal aunt; Depression in her brother; Diabetes in her maternal grandmother; Eczema in her brother; Heart attack in her paternal grandmother; Heart failure in her maternal grandfather; Lupus in her paternal aunt; Migraines in her father and paternal aunt; Psoriasis in her mother; Rheum arthritis in her mother; Suicidality in her brother..       Review of patient's allergies indicates:   Allergen Reactions    Azithromycin Hives, Rash and Swelling       Current Outpatient Medications on File Prior to Visit   Medication Sig Dispense Refill    azelastine (ASTELIN) 137 mcg (0.1 %) nasal spray 1 spray (137 mcg total) by Nasal route 2 (two) times daily. 30 mL 3    butalbital-acetaminophen-caffeine -40 mg (FIORICET, ESGIC) -40 mg per tablet Take 1 tablet by mouth every 6 (six) hours as needed. 20 tablet 0    clobetasoL (TEMOVATE) 0.05 % external solution Apply topically once daily. Use to affected areas for up to 2 weeks then  take a 1 week break or decrease to 3 times weekly. Do not apply to groin or face 50 mL 2    dicyclomine (BENTYL) 20 mg tablet Take 1 tablet (20 mg total) by mouth every 8 (eight) hours as needed (abdominal cramping). 120 tablet 0    fluocinolone acetonide oiL 0.01 % Drop Place 1 application in ear(s) once daily. Use to affected areas for up to 2 weeks then take a 1 week break or decrease to 3 times weekly. For use in ears 20 mL 2    fluticasone propionate (FLONASE) 50 mcg/actuation nasal spray 2 sprays (100 mcg total) by Each Nostril route once daily. 9.9 mL 11    gabapentin (NEURONTIN) 100 MG capsule Take 100 mg by mouth daily as needed.      metoclopramide HCl (REGLAN) 10 MG tablet Take 1 tablet (10 mg total) by mouth every 6 (six) hours as needed (Nausea and vomiting). 20 tablet 0    pantoprazole (PROTONIX) 40 MG tablet Take 1 tablet (40 mg total) by mouth once daily. 30 tablet 3    polyethylene glycol (GLYCOLAX) 17 gram/dose powder Take 17 g by mouth once daily. Mix with 8 oz of water/liquid. 510 g 2    propranoloL (INDERAL LA) 80 MG 24 hr capsule Take 1 capsule (80 mg total) by mouth once daily. 90 capsule 3    risankizumab-rzaa (SKYRIZI) 150 mg/mL PnIj 360 mg every 8 weeks.      sertraline 200 mg Cap       topiramate (TOPAMAX) 25 MG tablet Take 1 tablet (25 mg total) by mouth once daily. 30 tablet 0    tretinoin (RETIN-A) 0.05 % cream Apply topically every evening. Start twice weekly and increase as tolerated. Pea sized amount to entire face. 20 g 3     Current Facility-Administered Medications on File Prior to Visit   Medication Dose Route Frequency Provider Last Rate Last Admin    triamcinolone acetonide injection 10 mg  10 mg Intradermal Once              Objective Findings:    Vital Signs:  LMP 05/17/2024 (Exact Date)   There is no height or weight on file to calculate BMI.    Physical Exam:  Physical Exam  Vitals reviewed.   Constitutional:       Appearance: She is normal weight. She is not ill-appearing.    HENT:      Mouth/Throat:      Mouth: Mucous membranes are moist.      Pharynx: Oropharynx is clear.   Eyes:      General: No scleral icterus.  Abdominal:      General: Bowel sounds are normal. There is no distension.      Palpations: Abdomen is soft. There is no mass.   Skin:     General: Skin is warm and dry.      Capillary Refill: Capillary refill takes less than 2 seconds.      Coloration: Skin is not jaundiced or pale.   Neurological:      Mental Status: She is alert and oriented to person, place, and time. Mental status is at baseline.             Labs:  Lab Results   Component Value Date    WBC 9.62 09/05/2023    HGB 14.2 09/05/2023    HCT 41.0 09/05/2023     09/05/2023    CRP 0.1 09/15/2023    CHOL 182 04/24/2023    TRIG 77 04/24/2023    HDL 69 04/24/2023    ALKPHOS 65 09/05/2023    ALT 8 (L) 09/05/2023    AST 13 09/05/2023     12/13/2023    K 4.0 12/13/2023     12/13/2023    CREATININE 1.1 12/13/2023    BUN 16 12/13/2023    CO2 25 12/13/2023    TSH 2.444 12/13/2023    HGBA1C 5.1 04/24/2023       Imaging reviewed: CT Enterography 5/3/2024  FINDINGS:  Heart is normal size.  No pericardial effusion.     Inferior lungs are clear.     No focal liver lesion.  Gallbladder and bile ducts are unremarkable.     Spleen is upper limit of normal size.     Adrenal glands are unremarkable.     No focal pancreatic mass or ductal dilatation.     No focal renal abnormality.  Suspect bilateral extrarenal pelvis.  No definite hydronephrosis or ureteral dilatation.     Bladder is unremarkable.     Uterus and adnexa are within normal limits, noting dominant left ovarian follicle.     Stomach is unremarkable.  Wall thickening of the duodenum (series 601, image 63).  Wall thickening of several loops of jejunum in the left upper quadrant (series 601, image 38).  No significant perienteric edema or free fluid.  Terminal ileum is not discretely visualized however distal ileum appears unremarkable.  Appendix is  normal caliber without inflammatory changes.  Large stool burden throughout the colon.  No abnormal colonic wall thickening.     No free intraperitoneal fluid or air.     No pathologically enlarged abdominopelvic lymph nodes.     No aneurysm.  Portal vein, splenic vein, and SMV are patent.     Body wall soft tissues are unremarkable.     No acute fracture or aggressive bone lesion.     Impression:     1. Wall thickening of the duodenum and jejunum suggestive for nonspecific small bowel inflammation.  No significant perienteric edema or free fluid.  Recommend clinical correlation.  2. Large stool burden throughout the colon, recommend clinical correlation for constipation.  3. Additional findings as above.        Electronically signed by:Yahir Wood  Date:                                            05/03/2024  Time:                                           15:47      Endoscopy reviewed: Colonoscopy 8/22/2023  Impression:            - The examined portion of the ileum was normal.                          - The entire examined colon is normal. Biopsied.                          - The examination was otherwise normal on direct                          and retroflexion views.   Recommendation:        - Discharge patient to home.                          - Patient has a contact number available for                          emergencies. The signs and symptoms of potential                          delayed complications were discussed with the                          patient. Return to normal activities tomorrow.                          Written discharge instructions were provided to                          the patient.                          - Resume previous diet.                          - Continue present medications.                          - Await pathology results.                          - Repeat colonoscopy at age 45 for screening                          purposes.                          - I dont see any  evidence to support something                          like IBD / Crohns.   Stanton Ferrell MD   8/22/2023 2:38:57 PM     Pathology      Assessment:  1. Crohn's disease of colon with rectal bleeding    2. Hematochezia    3. Rectal tenesmus    4. Non-intractable vomiting with nausea      Orders Placed This Encounter    Quantiferon Gold TB    Hepatitis B Surface Antigen    Hepatitis B Core Antibody, Total    Hepatitis B Surface Antibody, Qual/Quant    Vitamin D    Vitamin B12    Comprehensive Metabolic Panel    CBC W/ AUTO DIFFERENTIAL    C-REACTIVE PROTEIN    HEPATIC FUNCTION PANEL         Plan:  Diagnosed by her primary MD in [facility]. Recent CT Enterography concerning for SB inflammation. Referral placed for urgent EGD/Colonoscopy.   Repeat Colonoscopy   EGD/Colonoscopy, Calpro, CRP, labs.  Referral for EGD/Colonoscopy. If indicated, will refer to IBD clinic for further management.   RTC pending findings above.       Thank you for allowing me to participate in this patient's care.    Sincerely,     Michelle Valadez NP  Gastroenterology Department  [facility]

## 2024-06-05 ENCOUNTER — OFFICE VISIT (OUTPATIENT)
Dept: OTOLARYNGOLOGY | Facility: CLINIC | Age: 24
End: 2024-06-05
Payer: COMMERCIAL

## 2024-06-05 ENCOUNTER — CLINICAL SUPPORT (OUTPATIENT)
Dept: OTOLARYNGOLOGY | Facility: CLINIC | Age: 24
End: 2024-06-05
Payer: COMMERCIAL

## 2024-06-05 VITALS
WEIGHT: 165.44 LBS | HEART RATE: 63 BPM | DIASTOLIC BLOOD PRESSURE: 81 MMHG | SYSTOLIC BLOOD PRESSURE: 131 MMHG | BODY MASS INDEX: 25.16 KG/M2

## 2024-06-05 DIAGNOSIS — J34.89 NASAL OBSTRUCTION: Chronic | ICD-10-CM

## 2024-06-05 DIAGNOSIS — J34.3 HYPERTROPHY OF INFERIOR NASAL TURBINATE: Primary | ICD-10-CM

## 2024-06-05 DIAGNOSIS — H93.13 TINNITUS OF BOTH EARS: ICD-10-CM

## 2024-06-05 DIAGNOSIS — J34.2 NASAL SEPTAL DEVIATION: Chronic | ICD-10-CM

## 2024-06-05 DIAGNOSIS — H90.3 SENSORINEURAL HEARING LOSS, BILATERAL: Primary | ICD-10-CM

## 2024-06-05 DIAGNOSIS — R79.89 LOW VITAMIN D LEVEL: Primary | ICD-10-CM

## 2024-06-05 DIAGNOSIS — J34.2 NASAL SEPTAL DEVIATION: ICD-10-CM

## 2024-06-05 DIAGNOSIS — J31.0 CHRONIC NONALLERGIC RHINITIS: Primary | Chronic | ICD-10-CM

## 2024-06-05 DIAGNOSIS — J34.3 HYPERTROPHY OF INFERIOR NASAL TURBINATE: Chronic | ICD-10-CM

## 2024-06-05 LAB
GAMMA INTERFERON BACKGROUND BLD IA-ACNC: 0.05 IU/ML
M TB IFN-G CD4+ BCKGRND COR BLD-ACNC: 0.02 IU/ML
M TB IFN-G CD4+ BCKGRND COR BLD-ACNC: 0.04 IU/ML
MITOGEN IGNF BCKGRD COR BLD-ACNC: 9.95 IU/ML
TB GOLD PLUS: NEGATIVE

## 2024-06-05 PROCEDURE — 3075F SYST BP GE 130 - 139MM HG: CPT | Mod: CPTII,S$GLB,, | Performed by: OTOLARYNGOLOGY

## 2024-06-05 PROCEDURE — 3079F DIAST BP 80-89 MM HG: CPT | Mod: CPTII,S$GLB,, | Performed by: OTOLARYNGOLOGY

## 2024-06-05 PROCEDURE — 92567 TYMPANOMETRY: CPT | Mod: S$GLB,,, | Performed by: AUDIOLOGIST

## 2024-06-05 PROCEDURE — 99214 OFFICE O/P EST MOD 30 MIN: CPT | Mod: 25,S$GLB,, | Performed by: OTOLARYNGOLOGY

## 2024-06-05 PROCEDURE — 99999 PR PBB SHADOW E&M-EST. PATIENT-LVL III: CPT | Mod: PBBFAC,,, | Performed by: OTOLARYNGOLOGY

## 2024-06-05 PROCEDURE — 3008F BODY MASS INDEX DOCD: CPT | Mod: CPTII,S$GLB,, | Performed by: OTOLARYNGOLOGY

## 2024-06-05 PROCEDURE — 92557 COMPREHENSIVE HEARING TEST: CPT | Mod: S$GLB,,, | Performed by: AUDIOLOGIST

## 2024-06-05 PROCEDURE — 31231 NASAL ENDOSCOPY DX: CPT | Mod: S$GLB,,, | Performed by: OTOLARYNGOLOGY

## 2024-06-05 PROCEDURE — 1160F RVW MEDS BY RX/DR IN RCRD: CPT | Mod: CPTII,S$GLB,, | Performed by: OTOLARYNGOLOGY

## 2024-06-05 PROCEDURE — 99999 PR PBB SHADOW E&M-EST. PATIENT-LVL I: CPT | Mod: PBBFAC,,, | Performed by: AUDIOLOGIST

## 2024-06-05 PROCEDURE — 1159F MED LIST DOCD IN RCRD: CPT | Mod: CPTII,S$GLB,, | Performed by: OTOLARYNGOLOGY

## 2024-06-05 RX ORDER — MUPIROCIN 20 MG/G
OINTMENT TOPICAL 2 TIMES DAILY
Qty: 15 G | Refills: 0 | Status: SHIPPED | OUTPATIENT
Start: 2024-06-05 | End: 2024-06-15

## 2024-06-05 RX ORDER — AZELASTINE 1 MG/ML
1 SPRAY, METERED NASAL 2 TIMES DAILY
Qty: 30 ML | Refills: 3 | Status: SHIPPED | OUTPATIENT
Start: 2024-06-05 | End: 2024-06-10

## 2024-06-05 RX ORDER — ERGOCALCIFEROL 1.25 MG/1
50000 CAPSULE ORAL
Qty: 8 CAPSULE | Refills: 0 | Status: SHIPPED | OUTPATIENT
Start: 2024-06-05

## 2024-06-05 NOTE — PROGRESS NOTES
Kiya Renae was seen today in the clinic for an audiologic evaluation.  Her main complaint was tinnitus in both ears but worse in the left ear.    Tympanometry revealed a Type A tympanogram for both ears.  Audiogram results revealed a mild high frequency sensorineural hearing loss bilaterally.  Speech reception thresholds were obtained at 5dB for both ears and speech discrimination scores were 100% for both ears.    Recommendations:  Otologic evaluation  Annual evaluation  Hearing protection in noise

## 2024-06-05 NOTE — PROCEDURES
Nasal/sinus endoscopy    Date/Time: 6/5/2024 8:00 AM    Performed by: Luzmaria Sanon MD  Authorized by: Luzmaria Sanon MD    Consent Done?:  Yes (Verbal)  Anesthesia:     Local anesthetic:  Lidocaine 2% and Steven-Synephrine 1/2%  Nose:     Procedure Performed:  Nasal Endoscopy  External:      No external nasal deformity  Intranasal:      Mucosa no polyps     Mucosa ulcers not present     No mucosa lesions present     Enlarged turbinates     Septum gross deformity (+ NSD to left with spur abutting inferior turbinate.)  Nasopharynx:      Posterior choanae patent     Eustachian tube patent     Bilateral middle turbinate edema with narrowing of the middle meatus; improvement in crusting in the inferior meatus/middle meatus. + narrowing of sphenoethmoid recess

## 2024-06-07 LAB
HBV SURFACE AB SER QL IA: NEGATIVE
HBV SURFACE AB SERPL IA-ACNC: <3 MIU/ML

## 2024-06-10 ENCOUNTER — OFFICE VISIT (OUTPATIENT)
Dept: NEUROLOGY | Facility: CLINIC | Age: 24
End: 2024-06-10
Payer: COMMERCIAL

## 2024-06-10 VITALS
SYSTOLIC BLOOD PRESSURE: 117 MMHG | WEIGHT: 160.94 LBS | BODY MASS INDEX: 24.47 KG/M2 | HEART RATE: 57 BPM | DIASTOLIC BLOOD PRESSURE: 74 MMHG

## 2024-06-10 DIAGNOSIS — S06.0X9S CONCUSSION WITH LOSS OF CONSCIOUSNESS, SEQUELA: Primary | ICD-10-CM

## 2024-06-10 DIAGNOSIS — G44.86 CERVICOGENIC HEADACHE: ICD-10-CM

## 2024-06-10 DIAGNOSIS — M54.2 CERVICALGIA OF OCCIPITO-ATLANTO-AXIAL REGION: ICD-10-CM

## 2024-06-10 DIAGNOSIS — M54.81 OCCIPITAL NEURITIS: ICD-10-CM

## 2024-06-10 DIAGNOSIS — S13.4XXA WHIPLASH INJURY TO NECK, INITIAL ENCOUNTER: ICD-10-CM

## 2024-06-10 DIAGNOSIS — R41.89 COGNITIVE CHANGE: ICD-10-CM

## 2024-06-10 DIAGNOSIS — F34.89 OTHER SPECIFIED PERSISTENT MOOD DISORDERS: ICD-10-CM

## 2024-06-10 PROBLEM — S06.0X9A CONCUSSION WITH LOSS OF CONSCIOUSNESS: Status: ACTIVE | Noted: 2024-06-10

## 2024-06-10 PROCEDURE — 1159F MED LIST DOCD IN RCRD: CPT | Mod: CPTII,S$GLB,, | Performed by: PSYCHIATRY & NEUROLOGY

## 2024-06-10 PROCEDURE — 3008F BODY MASS INDEX DOCD: CPT | Mod: CPTII,S$GLB,, | Performed by: PSYCHIATRY & NEUROLOGY

## 2024-06-10 PROCEDURE — 3074F SYST BP LT 130 MM HG: CPT | Mod: CPTII,S$GLB,, | Performed by: PSYCHIATRY & NEUROLOGY

## 2024-06-10 PROCEDURE — 99999 PR PBB SHADOW E&M-EST. PATIENT-LVL III: CPT | Mod: PBBFAC,,, | Performed by: PSYCHIATRY & NEUROLOGY

## 2024-06-10 PROCEDURE — 3078F DIAST BP <80 MM HG: CPT | Mod: CPTII,S$GLB,, | Performed by: PSYCHIATRY & NEUROLOGY

## 2024-06-10 PROCEDURE — 99215 OFFICE O/P EST HI 40 MIN: CPT | Mod: S$GLB,,, | Performed by: PSYCHIATRY & NEUROLOGY

## 2024-06-10 PROCEDURE — 1160F RVW MEDS BY RX/DR IN RCRD: CPT | Mod: CPTII,S$GLB,, | Performed by: PSYCHIATRY & NEUROLOGY

## 2024-06-10 RX ORDER — ONDANSETRON HYDROCHLORIDE 8 MG/1
8 TABLET, FILM COATED ORAL EVERY 8 HOURS PRN
COMMUNITY

## 2024-06-10 RX ORDER — RIMEGEPANT SULFATE 75 MG/75MG
75 TABLET, ORALLY DISINTEGRATING ORAL ONCE AS NEEDED
COMMUNITY

## 2024-06-10 RX ORDER — METHYLPREDNISOLONE 4 MG/1
TABLET ORAL
Qty: 1 EACH | Refills: 0 | Status: SHIPPED | OUTPATIENT
Start: 2024-06-10

## 2024-06-10 NOTE — PATIENT INSTRUCTIONS
Medrol dose pack prescribed. Take as instructed on package insert.  The patient was instructed to ice the occipital region for no more than 20 minutes at least once a day but may repeat this as many times as they would like.  Discussed ergonomic accommodations for occipital neuritis/neuralgia. Mainly perform all work at eye level to minimize continued neck flexion which will aggravate the nerve.  Patient was encouraged to do daily light cardio exercise but instructed to limit physical activities to walking, walking in water up to the waist only or riding a stationary bike, recumbent preferred. No weight lifting in upper body, no neck massage, no acupuncture of neck, and no dry needling of upper neck. No neck PT unless otherwise stated. If neck PT is recommended, the therapist may do joint manipulation at this time but no suboccipital soft tissue therapy. Any of your therapists may also do passive neck range of motion activities. No chiropractor work on neck. Lower body strengthening with resistant bands, leg machines, and strapping weights to legs okay. No core body workouts. No running or use of cardio equipment other than stationary bike. No swimming or body surfing. No amusement park rides. No lifting more than 5-10 lbs and bend at the knees, not the waist.  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 To be referred for consultation for occipital nerve release procedure if initially clinically responsive to injections but always with a return of symptoms.  Continue to follow in movement disorders clinic  Continue to follow with talk therapist for mood

## 2024-06-10 NOTE — PROGRESS NOTES
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 Final    Bald Yoder Class 04/17/2024 CLASS 0   Final    Comment: Analytical and performance characteristics have been  established by [facility].  It has not   been cleared or approved by the FDA.  The FDA has  determined that such clearance or approval is not   necessary.  This test is used for clinical purposes.    It should not be regarded as investigational or research.    Test performed at [facility],  [address] [place]  [address]     [phone]  Aislinn Galaviz MD, PhD - Medical Director      D. farinae 04/17/2024 <0.10  <0.10 kU/L Final    D. farinae Class 04/17/2024 CLASS 0   Final    Comment: Test performed at [facility],  [address] [place], [place]  [id number] [phone]  Aislinn Galaviz MD, PhD - Medical Director      D. pteronyssinus Dust Mite IgE 04/17/2024 <0.10  <0.10 kU/L Final    D. pteronyssinus Class 04/17/2024 CLASS 0   Final    Comment: Test performed at [facility],  [address], [place]  [address]     [phone]  Aislinn Galaviz MD, PhD - Medical Director      Dog Dander IgE 04/17/2024  <0.10  <0.10 kU/L Final    Dog Dander Class 04/17/2024 CLASS 0   Final    Comment: Test performed at [facility],  [address] [place]  [address]     [phone]  Aislinn Galaviz MD, PhD - Medical Director      English Plantain IgE 04/17/2024 <0.10  <0.10 kU/L Final    English Plantain Class 04/17/2024 CLASS 0   Final    Comment: Test performed at [facility],  [address] [place]  [address]     [phone]  Aislinn Gaalviz MD, PhD - Medical Director      Pj Grass IgE 04/17/2024 <0.10  <0.10 kU/L Final    Pj Grass Class 04/17/2024 CLASS 0   Final    Comment: Test performed at [facility],  [address] [place]  [address]     [phone]  Aislinn Galaviz MD, PhD - Medical Director      [facility] IgE 04/17/2024 <0.10  <0.10 kU/L Final    Marshelder Class 04/17/2024 CLASS 0   Final    Comment: Test performed at [facility],  [address] [place]  [address]     [phone]  Aislinn Galaviz MD, PhD - Medical Director      Mugwort 04/17/2024 <0.10  <0.10 kU/L Final    Mugwort Class 04/17/2024 CLASS 0   Final    Comment: Test performed at [facility],  [address] [place]  [address]     [phone]  Aislinn Galaviz MD, PhD - Medical Director      Nettle 04/17/2024 <0.10  <0.10 kU/L Final    Nettle Class 04/17/2024 CLASS 0   Final    Comment: Test performed at [facility],  [address] [place]  [address]     [phone]  Aislinn Galaviz MD, PhD - Medical Director      Wiota Tree IgE 04/17/2024 <0.10  <0.10 kU/L Final    Lingle, Class 04/17/2024 CLASS 0   Final    Comment: Test performed at [facility],  [address], [place]  [address]     [phone]  Aislinn Galaviz MD, PhD - Medical Director      Penicillium IgE 04/17/2024 <0.10  <0.10 kU/L Final    Penicillium Class 04/17/2024 CLASS 0   Final    Comment: Test performed at [facility],  [address] , [place]  [address]     [phone]  Aislinn Galaviz MD, PhD - Medical Director      Ragweed, Short, IgE 04/17/2024 <0.10  <0.10 kU/L Final    Ragweed, Short, Class 04/17/2024 CLASS 0   Final    Comment: Test performed at [facility],  [address] [place], [place]  [id number] [phone]  Aislinn Galaviz MD, PhD - Medical Director      Mike Grass IgE 04/17/2024 <0.10  <0.10 kU/L Final    Mike Grass Class 04/17/2024 CLASS 0   Final    Comment: Test performed at [facility],  [address] [place]  [address]     [phone]  Aislinn Galaviz MD, PhD - Medical Director      Cocklebur IgE 04/17/2024 <0.10  <0.10 kU/L Final    Cocklebur Class 04/17/2024 CLASS 0   Final    Comment: Test performed at [facility],  [address] [place]  [address]     [phone]  Aislinn Galaviz MD, PhD - Medical Director      Ambrociolorelei Perezar, IgE 04/17/2024 <0.10  <0.10 kU/L Final    [place] Class 04/17/2024 CLASS 0   Final    Comment: Analytical and performance characteristics have been  established by [facility].  It has not   been cleared or approved by the FDA.  The FDA has  determined that such clearance or approval is not   necessary.  This test is used for clinical purposes.    It should not be regarded as investigational or research.    Test performed at [facility],  [address] [place], [place]  [id number] [phone]  Aislinn Galaviz MD, PhD - Medical Director      P[place] Grass (Mohindery Blue), IgE 04/17/2024 <0.10  <0.10 kU/L Final    Meadow Grass (Mohindery Blue) Class 04/17/2024 CLASS 0   Final    Comment: Test performed at [facility],  [address] [place], [place]  [id number] [phone]  Aislinn Galaviz MD, PhD - Medical Director      Lorna saavedra, IgE 04/17/2024 <0.10  <0.10 kU/L Final    Mucor racemosus Class 04/17/2024 CLASS 0   Final    Comment: Test performed at  [facility],  [address], [place], [place]  [id number] [phone]  Aislinn Galaviz MD, PhD - Medical Director      Pecan [place] Tree IgE 04/17/2024 <0.10  <0.10 kU/L Final    Pecan, Class 04/17/2024 CLASS 0   Final    Comment: Test performed at [facility],  [address] [place]  [address] [phone]  Aislinn Galaviz MD, PhD - Medical Director      White Pedro Tree IgE 04/17/2024 <0.10  <0.10 kU/L Final    White Pedro Class 04/17/2024 CLASS 0   Final    Comment: Test performed at [facility],  [address] [place], [place]  [id number] [phone]  Aislinn Galaviz MD, PhD - Medical Director      A. alternata IgE 04/17/2024 <0.10  <0.10 kU/L Final    Altern. alternata Class 04/17/2024 CLASS 0   Final    Comment: Test performed at [facility],  [address] [place]  [address] [phone]  Aislinn Galaviz MD, PhD - Medical Director      [place] IgE 04/17/2024 <0.10  <0.10 kU/L Final    [place] Class 04/17/2024 CLASS 0   Final    Comment: Test performed at [facility],  [address] [place]  [address] [phone]  Aislinn Galaviz MD, PhD - Medical Director      Pigweed IgE 04/17/2024 <0.10  <0.10 kU/L Final    Common Pigweed Class 04/17/2024 CLASS 0   Final    Comment: Test performed at [facility],  [address] [place]  [address] [phone]  Aislinn Galaviz MD, PhD - Medical Director      Silver [place]ont IgE 04/17/2024 <0.10  <0.10 kU/L Final    Silver Birch Class 04/17/2024 CLASS 0   Final    Comment: Test performed at [facility],  [address] [place]  [address] [phone]  Aislinn Galaviz MD, PhD - Medical Director      Feather Panel #2 04/17/2024 <0.10  <0.70 kU/L Final    Comment: Class 0 (Negative <0.10)    -------------------ADDITIONAL INFORMATION-------------------  Feather Panel  2:  Goose feathers  Chicken feathers  Duck  feathers  [place] feathers    Test Performed by:  [facility]  [address] [place] [address]  : Nj Ross M.D. Ph.D.; CLIA# [id number]      RAST Allergen for [place] 04/17/2024 <0.10  <0.70 kU/L Final    Comment: Class 0 (Negative <0.10)    Test Performed by:  [facility] [place] [address] [place] [address]  : Nj Ross M.D. Ph.D.; CLIA# [id number]      Boxelder Maple Tree IgE 04/17/2024 <0.10  <0.10 kU/L Final    Allergen Maple ([place]) Class 04/17/2024 CLASS 0   Final    Comment: Test performed at [facility],  [address] [place]  [address]     [phone]  Aislinn Galaviz MD, PhD - Medical Director      [place] Tree IgE 04/17/2024 <0.10  <0.10 kU/L Final    Allergen [place] Class 04/17/2024 CLASS 0   Final    Comment: Test performed at [facility],  [address] [place], [place]  [id number] [phone]  Aislinn Galaviz MD, PhD - Medical Director      Ziegler Quarters IgE 04/17/2024 <0.10  <0.10 kU/L Final    Allergen Ziegler's Quarters Class 04/17/2024 CLASS 0   Final    Comment: Test performed at [facility],  [address] [place], [place]  [id number] [phone]  Aislinn Galaviz MD, PhD - Medical Director      Allergen Sheep [place] (Yel Dock) I* 04/17/2024 <0.10  <0.10 kU/L Final    Allergen Sheep [place] (Yel Dock) C* 04/17/2024 CLASS 0   Final    Comment: Test performed at [facility],  [address] [place], [place]  [id number] [phone]  Aislinn Galaviz MD, PhD - Medical Director     Lab Visit on 01/17/2024   Component Date Value Ref Range Status    Copper 01/17/2024 910  810 - 1990 ug/L Final    Comment: Copper values may be elevated to twice the normal   levels in pregnancy.      Elevated results may be due to sample collected in a   non-certified trace element-free tube.      This test was developed and the performance   characteristics determined by [facility].   It has not been cleared or approved by the FDA.   The laboratory is regulated under CLIA as qualified to   perform high-complexity testing. This test is used for   patient testing purposes. It should not be regarded   as investigational or for research.    Test performed at [facility],  [address] [place]  [address]     [phone]  Aislinn Galaviz MD, PhD - Medical Director      Zinc 01/17/2024 68  60 - 130 ug/dL Final    Comment: Elevated results may be due to sample collected in a   non-certified trace element-free tube.     This test was developed and the performance   characteristics determined by [facility].   It has not been cleared or approved by the FDA.   The laboratory is regulated under CLIA as qualified to   perform high-complexity testing. This test is used for   patient testing purposes. It should not be regarded   as investigational or for research.    Test performed at [facility],  [address] [place]  [address]     [phone]  Aislinn Galaviz MD, PhD - Medical Director      Vitamin B-12 01/17/2024 1058 (H)  210 - 950 pg/mL Final    Thiamine 01/17/2024 60  38 - 122 ug/L Final    Comment: This test was developed and the performance   characteristics determined by [facility].   It has not been cleared or approved by the FDA.   The laboratory is regulated under CLIA as qualified to   perform high-complexity testing. This test is used for   patient testing purposes. It should not be regarded   as investigational or for research.    Test performed at [facility],  [address] [place]  [address]     [phone]  Aislinn Galaviz MD, PhD - Medical Director      RADHA,  Amphiphysin Ab, Serum 01/17/2024 Negative  Negative Final    Comment: -------------------ADDITIONAL  INFORMATION-------------------  This test was developed and its performance characteristics   determined by [facility] in a manner consistent with CLIA   requirements. This test has not been cleared or approved by   the U.S. Food and Drug Administration.      RADHA HAIR1, Serum 01/17/2024 Negative  Negative Final    Comment: -------------------ADDITIONAL INFORMATION-------------------  This test was developed and its performance characteristics   determined by [facility] in a manner consistent with CLIA   requirements. This test has not been cleared or approved by   the U.S. Food and Drug Administration.      RADHA GARCIA1, Serum 01/17/2024 Negative  Negative Final    Comment: -------------------ADDITIONAL INFORMATION-------------------  This test was developed and its performance characteristics   determined by [facility] in a manner consistent with CLIA   requirements. This test has not been cleared or approved by   the U.S. Food and Drug Administration.      RADHA GARCIA2, Serum 01/17/2024 Negative  Negative Final    Comment: -------------------ADDITIONAL INFORMATION-------------------  This test was developed and its performance characteristics   determined by [facility] in a manner consistent with CLIA   requirements. This test has not been cleared or approved by   the U.S. Food and Drug Administration.      RADHA GARCIA3, Serum 01/17/2024 Negative  Negative Final    Comment: -------------------ADDITIONAL INFORMATION-------------------  This test was developed and its performance characteristics   determined by [facility] in a manner consistent with CLIA   requirements. This test has not been cleared or approved by   the U.S. Food and Drug Administration.      CASPR2-IgG CBA 01/17/2024 Negative  Negative Final    Comment: -------------------ADDITIONAL INFORMATION-------------------  This test was developed and its performance characteristics   determined by [facility] in a manner consistent with CLIA    requirements. This test has not been cleared or approved by   the U.S. Food and Drug Administration.      Collapsin Response- Protei* 01/17/2024 Negative  Negative Final    Comment: -------------------ADDITIONAL INFORMATION-------------------  This test was developed and its performance characteristics   determined by [facility] in a manner consistent with CLIA   requirements. This test has not been cleared or approved by   the U.S. Food and Drug Administration.      DPPX Ab IFA, S 01/17/2024 Negative  Negative Final    Comment: -------------------ADDITIONAL INFORMATION-------------------  This test was developed and its performance characteristics   determined by [facility] in a manner consistent with CLIA   requirements. This test has not been cleared or approved by   the U.S. Food and Drug Administration.      Glutamic Acid Decarb Ab 01/17/2024 0.02  <=0.02 nmol/L Final    Comment: -------------------ADDITIONAL INFORMATION-------------------  This test was developed and its performance characteristics   determined by [facility] in a manner consistent with CLIA   requirements. This test has not been cleared or approved by   the U.S. Food and Drug Administration.      LGI1-IgG CBA 01/17/2024 Negative  Negative Final    Comment: -------------------ADDITIONAL INFORMATION-------------------  This test was developed and its performance characteristics   determined by [facility] in a manner consistent with CLIA   requirements. This test has not been cleared or approved by   the U.S. Food and Drug Administration.      mGluR1 Ab, IFA, Serum 01/17/2024 Negative  Negative Final    Comment: -------------------ADDITIONAL INFORMATION-------------------  This test was developed and its performance characteristics   determined by [facility] in a manner consistent with CLIA   requirements. This test has not been cleared or approved by   the U.S. Food and Drug Administration.      NMDA-R Ab CBA, Serum 01/17/2024  Negative  Negative Final    Comment: -------------------ADDITIONAL INFORMATION-------------------  This test was developed and its performance characteristics   determined by [facility] in a manner consistent with CLIA   requirements. This test has not been cleared or approved by   the U.S. Food and Drug Administration.      P/Q Type Calcium Channel Ab 01/17/2024 0.00  <=0.02 nmol/L Final    Comment: -------------------ADDITIONAL INFORMATION-------------------  This test was developed and its performance characteristics   determined by [facility] in a manner consistent with CLIA   requirements. This test has not been cleared or approved by   the U.S. Food and Drug Administration.      SHASHI GARCIA1, Serum 01/17/2024 Negative  Negative Final    Comment: -------------------ADDITIONAL INFORMATION-------------------  This test was developed and its performance characteristics   determined by [facility] in a manner consistent with CLIA   requirements. This test has not been cleared or approved by   the U.S. Food and Drug Administration.      SHASHI GARCIA2, Serum 01/17/2024 Negative  Negative Final    Comment: -------------------ADDITIONAL INFORMATION-------------------  This test was developed and its performance characteristics   determined by [facility] in a manner consistent with CLIA   requirements. This test has not been cleared or approved by   the U.S. Food and Drug Administration.      SHASHI GARCIATr, Serum 01/17/2024 Negative  Negative Final    Comment: -------------------ADDITIONAL INFORMATION-------------------  This test was developed and its performance characteristics   determined by [facility] in a manner consistent with CLIA   requirements. This test has not been cleared or approved by   the U.S. Food and Drug Administration.      CARLENE CAREYS 01/17/2024 Negative  Negative Final    Comment: -------------------ADDITIONAL INFORMATION-------------------  This test was developed and its performance  characteristics   determined by [facility] in a manner consistent with CLIA   requirements. This test has not been cleared or approved by   the U.S. Food and Drug Administration.      IgLON5 [facility], S 01/17/2024 Negative  Negative Final    Comment: -------------------ADDITIONAL INFORMATION-------------------  This test was developed and its performance characteristics   determined by [facility] in a manner consistent with CLIA   requirements. This test has not been cleared or approved by   the U.S. Food and Drug Administration.      ITPR1 [facility], S 01/17/2024 Negative  Negative Final    Comment: -------------------ADDITIONAL INFORMATION-------------------  This test was developed and its performance characteristics   determined by [facility] in a manner consistent with CLIA   requirements. This test has not been cleared or approved by   the U.S. Food and Drug Administration.      NIF [facility], S 01/17/2024 Negative  Negative Final    Comment: -------------------ADDITIONAL INFORMATION-------------------  This test was developed and its performance characteristics   determined by [facility] in a manner consistent with CLIA   requirements. This test has not been cleared or approved by   the U.S. Food and Drug Administration.      Movement Disorder Interpretation 01/17/2024 SEE BELOW   Final    Comment: No informative autoantibodies were detected in this   evaluation. However, a negative result does not exclude an   autoimmune movement disorder. Sensitivity is enhanced by   testing both serum and CSF.      KLHK 11 Antibody CBA 01/17/2024 Negative  Negative Final    Comment: -------------------ADDITIONAL INFORMATION-------------------  This test was developed and its performance characteristics   determined by [facility] in a manner consistent with CLIA   requirements. This test has not been cleared or approved by   the U.S. Food and Drug Administration.      AP3B2 IFA 01/17/2024 Negative  Negative Final    Comment:  -------------------ADDITIONAL INFORMATION-------------------  This test was developed and its performance characteristics   determined by [facility] in a manner consistent with CLIA   requirements. This test has not been cleared or approved by   the U.S. Food and Drug Administration.      ROBERT [facility], S 01/17/2024 Negative  Negative Final    Comment: -------------------ADDITIONAL INFORMATION-------------------  This test was developed and its performance characteristics   determined by [facility] in a manner consistent with CLIA   requirements. This test has not been cleared or approved by   the U.S. Food and Drug Administration.      ARPIT SEPTIN-5 [facility], S 01/17/2024 Negative  Negative Final    Comment: -------------------ADDITIONAL INFORMATION-------------------  This test was developed and its performance characteristics   determined by [facility] in a manner consistent with CLIA   requirements. This test has not been cleared or approved by   the U.S. Food and Drug Administration.      M SEPTIN-7 [facility], S 01/17/2024 Negative  Negative Final    Comment: -------------------ADDITIONAL INFORMATION-------------------  This test was developed and its performance characteristics   determined by [facility] in a manner consistent with CLIA   requirements. This test has not been cleared or approved by   the U.S. Food and Drug Administration.    Test Performed by:  [facility] - [address] [address]  : Nj Ross M.D. Ph.D.; CLIA# [id number]      NEUROCHONDRIN, [facility], S 01/17/2024 Negative  Negative Final    Comment: -------------------ADDITIONAL INFORMATION-------------------  This test was developed and its performance characteristics   determined by [facility] in a manner consistent with CLIA   requirements. This test has not been cleared or approved by   the U.S. Food and Drug Administration.      AMPA-R Ab CBA, Serum 01/17/2024 Negative  Negative Final     Comment: -------------------ADDITIONAL INFORMATION-------------------  This test was developed and its performance characteristics   determined by [facility] in a manner consistent with CLIA   requirements. This test has not been cleared or approved by   the U.S. Food and Drug Administration.      DARIAN-B-R Ab CBA, Serum 01/17/2024 Negative  Negative Final    Comment: -------------------ADDITIONAL INFORMATION-------------------  This test was developed and its performance characteristics   determined by [facility] in a manner consistent with CLIA   requirements. This test has not been cleared or approved by   the U.S. Food and Drug Administration.      Folate 01/17/2024 8.3  4.0 - 24.0 ng/mL Final   Lab Visit on 12/13/2023   Component Date Value Ref Range Status    Scleroderma SCL- 12/13/2023 <0.6  <7.0 U/mL Final    Comment: INTERPRETATION: Negative    Test performed at [facility],  [address] [place]  [address]     [phone]  Aislinn Galaviz MD, PhD - Medical Director      RNA Polymerase III Antibodies, IgG* 12/13/2023 <20  <20 Units Final    Comment: Test Performed at:  [facility]  [address] [place] [address]  [id number]     MAYRA Hartmann MD, PhD, ORLY      SCL-70 Antibody 12/13/2023 0.2  <1.0 (Negative) U Final    Comment: Test Performed by:  [facility]  [address] [place] [address]  : Nj Ross M.D. Ph.D.; CLIA# [id number]      Anti Sm/RNP Antibody 12/13/2023 0.08  0.00 - 0.99 Ratio Final    Anti-Sm/RNP Interpretation 12/13/2023 Negative  Negative Final    Th/To 12/13/2023 Negative  Negative Final    Comment: This test was developed and its performance characteristics  determined by [facility]. It has not been cleared or  approved by the Food and Drug Administration.    Test Performed by:  [facility]  [address] [place] [address]       Anti-Joanne-1 Antibody 12/13/2023 <20  <20 Units Final    PL-7 12/13/2023 Negative  Negative Final    Comment: This test was developed and its performance characteristics  determined by [facility]. It has not been cleared or  approved by the Food and Drug Administration.      PL-12 12/13/2023 Negative  Negative Final    Comment: This test was developed and its performance characteristics  determined by [facility]. It has not been cleared or  approved by the Food and Drug Administration.      EJ 12/13/2023 Negative  Negative Final    Comment: This test was developed and its performance characteristics  determined by [facility]. It has not been cleared or  approved by the Food and Drug Administration.      OJ 12/13/2023 Negative  Negative Final    Comment: This test was developed and its performance characteristics  determined by [facility]. It has not been cleared or  approved by the Food and Drug Administration.      SRP 12/13/2023 Negative  Negative Final    Comment: This test was developed and its performance characteristics  determined by [facility]. It has not been cleared or  approved by the Food and Drug Administration.      MI-2 12/13/2023 Negative  Negative Final    Comment: This test was developed and its performance characteristics  determined by [facility]. It has not been cleared or  approved by the Food and Drug Administration.      TIF1 GAMMA (P155/140) 12/13/2023 <20  <20 Units Final    Comment: This test was developed and its performance characteristics  determined by [facility]. It has not been cleared or  approved by the Food and Drug Administration.      MDA-5 (P140) (CADM-140) 12/13/2023 <20  <20 Units Final    Comment: This test was developed and its performance characteristics  determined by [facility]. It has not been cleared or  approved by the Food and Drug Administration.      NXP-2 (P140) 12/13/2023 <20  <20 Units Final    Comment: This test was developed and its performance characteristics  determined by [facility]. It  has not been cleared or  approved by the Food and Drug Administration.      Anti-PM/Scl Ab 12/13/2023 <20  <20 Units Final    Comment: This test was developed and its performance characteristics  determined by [facility]. It has not been cleared or  approved by the Food and Drug Administration.      Fibrillarin (U3 RNP) 12/13/2023 Negative  Negative Final    Comment: This test was developed and its performance characteristics  determined by [facility]. It has not been cleared or  approved by the Food and Drug Administration.  Interpretation for Anti-Joanne-1, Anti-TIF-1gamma,  Anti-MDA-5, Anti-NXP-2, Anti-PM/Scl-100,  Anti-SS-A 52 kD, Anti-U1 RNP:  Negative:                                <20  Weak Positive:                       20 - 39  Moderate Positive:                   40 - 80  Strong Positive:                         >80  .    Test Performed by:  [facility]  [address] [place] [address]      U2 snRNP 12/13/2023 Negative  Negative Final    Comment: This test was developed and its performance characteristics  determined by [facility]. It has not been cleared or  approved by the Food and Drug Administration.      Anti-U1-RNP  Ab 12/13/2023 <20  <20 Units Final    Ku 12/13/2023 Negative  Negative Final    Comment: This test was developed and its performance characteristics  determined by [facility]. It has not been cleared or  approved by the Food and Drug Administration.      Anti-SS-A 52 kD Ab, IgG 12/13/2023 <20  <20 Units Final    Comment: This test was developed and its performance characteristics  determined by [facility]. It has not been cleared or  approved by the Food and Drug Administration.      ds DNA Ab 12/13/2023 Negative 1:10  Negative 1:10 Final    Performed by fluorescent crithidia assay.    Anti-Histone Antibody 12/13/2023 1.1 (H)  0.0 - 0.9 Units Final    Comment: INTERPRETIVE INFORMATION: Histone Ab, IgG  0.9 Units or less ............ Negative  1.0 - 1.5 Units .............. Weak  Positive  1.6 - 2.5 Units .............. Moderate Positive  2.6 Units or greater ......... Strong Positive  Performed By: [facility]  [address] [place] [id number]  : Claudio Greene MD, PhD  CLIA Number: [id number]      MELYSSA Screen 12/13/2023 Negative <1:80  Negative <1:80 Final    MELYSSA test was performed by Immunofluorescence on HEP2 cells.       Arsenic 12/13/2023 <1  <13 ng/mL Final    Comment: -------------------ADDITIONAL INFORMATION-------------------  This test was developed and its performance characteristics   determined by [facility] in a manner consistent with CLIA   requirements. This test has not been cleared or approved by   the U.S. Food and Drug Administration.      Lead 12/13/2023 <1.0  <3.5 mcg/dL Final    Comment: -------------------ADDITIONAL INFORMATION-------------------  Testing performed by Triple Quadrupole Inductively Coupled   Plasma-Mass Spectrometry (ICP-MS/MS).  This test was developed and its performance characteristics   determined by [facility] in a manner consistent with CLIA   requirements. This test has not been cleared or approved by   the U.S. Food and Drug Administration.      Cadmium 12/13/2023 0.2  <5.0 ng/mL Final    Comment: -------------------ADDITIONAL INFORMATION-------------------  This test was developed and its performance characteristics   determined by [facility] in a manner consistent with CLIA   requirements. This test has not been cleared or approved by   the U.S. Food and Drug Administration.      Mercury 12/13/2023 <1  <10 ng/mL Final    Comment: -------------------ADDITIONAL INFORMATION-------------------  This test was developed and its performance characteristics   determined by [facility] in a manner consistent with CLIA   requirements. This test has not been cleared or approved by   the U.S. Food and Drug Administration.      Venous/Capillary 12/13/2023 Venous   Final    Comment: Test Performed by:  [facility]  [facility] - [facility]  [address] [place] [address]  : Nj Ross M.D. Ph.D.; CLIA# [id number]      Street Address 12/13/2023 Test Not Performed   Final    [facility] 12/13/2023 Test Not Performed   Final    [facility] 12/13/2023 Test Not Performed   Final    [facility] 12/13/2023 Test Not Performed   Final    [facility] 12/13/2023 Test Not Performed   Final    Guardian First Name 12/13/2023 Test Not Performed   Final    Guardian Last Name 12/13/2023 Test Not Performed   Final    Home Phone 12/13/2023 Test Not Performed   Final    Race 12/13/2023 Test Not Performed   Final    Rheumatoid Factor 12/13/2023 <13.0  0.0 - 15.0 IU/mL Final    CCP Antibodies 12/13/2023 0.7  <5.0 U/mL Final    Anti-SSA Antibody 12/13/2023 0.07  0.00 - 0.99 Ratio Final    Anti-SSA Interpretation 12/13/2023 Negative  Negative Final    Anti-SSB Antibody 12/13/2023 0.11  0.00 - 0.99 Ratio Final    Anti-SSB Interpretation 12/13/2023 Negative  Negative Final    TSH 12/13/2023 2.444  0.400 - 4.000 uIU/mL Final    Sodium 12/13/2023 142  136 - 145 mmol/L Final    Potassium 12/13/2023 4.0  3.5 - 5.1 mmol/L Final    Chloride 12/13/2023 109  95 - 110 mmol/L Final    CO2 12/13/2023 25  23 - 29 mmol/L Final    Glucose 12/13/2023 57 (L)  70 - 110 mg/dL Final    BUN 12/13/2023 16  6 - 20 mg/dL Final    Creatinine 12/13/2023 1.1  0.5 - 1.4 mg/dL Final    Calcium 12/13/2023 9.2  8.7 - 10.5 mg/dL Final    Anion Gap 12/13/2023 8  8 - 16 mmol/L Final    eGFR 12/13/2023 >60.0  >60 mL/min/1.73 m^2 Final      I personally reviewed all current labs noted in today's chart.    Imaging:  I personally reviewed all diagnostic images and reports and agree with findings in the radiographical report as noted below unless otherwise specified.    CT Head 5/21/24:  FINDINGS:  BRAIN:     Brain parenchyma appears normal in attenuation with normal gray-white matter differentiation.  No mass, mass effect or pathologic fluid collection.     Ventricles  and basilar cisterns appear appropriate.     Calvarium intact.  Sinuses are clear with no significant middle ear or mastoid opacity.  Orbits and orbital contents unremarkable.     Impression:     Negative CT of the brain.    My read: No acute intracranial abnormalities. Normal sella    Assessment:       ICD-10-CM ICD-9-CM    1. Concussion with loss of consciousness, sequela  S06.0X9S 907.0 Ambulatory referral/consult to Concussion Management Program      2. Whiplash injury to neck, initial encounter  S13.4XXA 847.0       3. Cervicalgia of hszaztyt-wwnsalg-vwurn region  M54.2 723.1       4. Cervicogenic headache  G44.86 784.0       5. Occipital neuritis  M54.81 723.8       6. Cognitive change  R41.89 799.59       7. Other specified persistent mood disorders  F34.89 296.99          23 yo female with h/o anxiety, hypothyroidism, lyme disease, depression, trigeminal neuropathy who presents for concussion evaluation. On exam, she has has occipital point tenderness over the bilateral greater and lesser occipital nerve with induction of headaches with jump sign and twitch response and referred pain to bifrontal and bilateral oc horn. Overall, her symptoms are worsening due to her injury. We discussed that there is currently no universally accepted definition of concussion. We discussed that concussion is a traumatic brain injury. We discussed that concussion can occur as a result of an impact to the head or to the body. We discussed that our clinic considers concussion definitive if there is transient disruption of brain activity such as with loss of consciousness, amnesia as it relates to the day of the injury, or reports of neurological dysfunction on the day of injury. We discussed typical course, signs & symptoms, diagnostic findings, and treatment for concussion. We discussed that whiplash injury is a neck injury that can occur at a lower impact speed or force than concussion. We discussed that whiplash symptoms can  be the same as concussion symptoms. We discussed typical course, signs & symptoms, diagnostic findings, and treatment for whiplash. Patient's exam and symptoms are the result of a kinetic force injury with resultant cranio cervical trauma and occipital neuritis. We discussed at length about the anatomy, symptomology, etiologies, and exam findings of occipital neuritis. We discussed the risks vs benefits of waiting vs acute therapy for occipital neuritis in that there is a risk of waiting for treatment in that permanent nerve damage could result as the nerve is chronically scarred into the muscle but there is no way to predict whether damage has already occurred until we start the treatment plan as described below. We discussed that head and/or neck injury can result in pain as well as cognitive, mood, and sleep disruption. We discussed that pain disturbances can disrupt sleep, cognition, and mood. We discussed that sleep disturbances can disrupt cognition, mood, and worsen pain. We discussed that mood disturbances can disrupt sleep, cognition, and worsen pain. Counseled the patient that steroids suppress the immune response, which means that they are at increased risk for rere bacterial or viral infections including COVID19 and if they were to become infected, they may have a more severe disease course. We discussed that any form of steroids including oral or injectable should not be taken within 2 weeks of COVID19 vaccination/booster. The patient has elected to take steroids. The patient's last COVID19 vaccination/booster was more than 2 weeks ago.    Plan:     Medrol dose pack prescribed. Take as instructed on package insert.  The patient was instructed to ice the occipital region for no more than 20 minutes at least once a day but may repeat this as many times as they would like.  Discussed ergonomic accommodations for occipital neuritis/neuralgia. Mainly perform all work at eye level to minimize continued  neck flexion which will aggravate the nerve.  Patient was encouraged to do daily light cardio exercise but instructed to limit physical activities to walking, walking in water up to the waist only or riding a stationary bike, recumbent preferred. No weight lifting in upper body, no neck massage, no acupuncture of neck, and no dry needling of upper neck. No neck PT unless otherwise stated. If neck PT is recommended, the therapist may do joint manipulation at this time but no suboccipital soft tissue therapy. Any of your therapists may also do passive neck range of motion activities. No chiropractor work on neck. Lower body strengthening with resistant bands, leg machines, and strapping weights to legs okay. No core body workouts. No running or use of cardio equipment other than stationary bike. No swimming or body surfing. No amusement park rides. No lifting more than 5-10 lbs and bend at the knees, not the waist.  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 To be referred for consultation for occipital nerve release procedure if initially clinically responsive to injections but always with a return of symptoms.  Continue to follow in movement disorders clinic  Continue to follow with talk therapist for mood    58 minutes were spent on the date of this patient encounter, which includes: preparing to see the patient, reviewing previous history, obtaining new patient history, performing the physical exam, counseling and educating the patient and/or family/caregiver, ordering necessary medications or tests or referrals, documenting in the electronic medical record, coordinating care.    Filemon Banuelos MD  Sports Neurology

## 2024-06-13 ENCOUNTER — PATIENT MESSAGE (OUTPATIENT)
Dept: GASTROENTEROLOGY | Facility: CLINIC | Age: 24
End: 2024-06-13
Payer: COMMERCIAL

## 2024-06-24 ENCOUNTER — TELEPHONE (OUTPATIENT)
Dept: ENDOSCOPY | Facility: HOSPITAL | Age: 24
End: 2024-06-24
Payer: COMMERCIAL

## 2024-06-25 ENCOUNTER — ANESTHESIA (OUTPATIENT)
Dept: ENDOSCOPY | Facility: HOSPITAL | Age: 24
End: 2024-06-25
Payer: COMMERCIAL

## 2024-06-25 ENCOUNTER — HOSPITAL ENCOUNTER (OUTPATIENT)
Facility: HOSPITAL | Age: 24
Discharge: HOME | End: 2024-06-25
Attending: INTERNAL MEDICINE | Admitting: INTERNAL MEDICINE
Payer: COMMERCIAL

## 2024-06-25 ENCOUNTER — ANESTHESIA EVENT (OUTPATIENT)
Dept: ENDOSCOPY | Facility: HOSPITAL | Age: 24
End: 2024-06-25
Payer: COMMERCIAL

## 2024-06-25 VITALS
HEART RATE: 60 BPM | HEIGHT: 68 IN | BODY MASS INDEX: 24.25 KG/M2 | DIASTOLIC BLOOD PRESSURE: 72 MMHG | SYSTOLIC BLOOD PRESSURE: 115 MMHG | TEMPERATURE: 98 F | RESPIRATION RATE: 18 BRPM | OXYGEN SATURATION: 100 % | WEIGHT: 160 LBS

## 2024-06-25 DIAGNOSIS — R11.2 NAUSEA AND VOMITING, UNSPECIFIED VOMITING TYPE: Primary | ICD-10-CM

## 2024-06-25 DIAGNOSIS — R93.5 ABNORMAL CT OF THE ABDOMEN: ICD-10-CM

## 2024-06-25 DIAGNOSIS — R19.7 DIARRHEA, UNSPECIFIED TYPE: ICD-10-CM

## 2024-06-25 LAB
B-HCG UR QL: NEGATIVE
CTP QC/QA: YES

## 2024-06-25 PROCEDURE — 63600175 PHARM REV CODE 636 W HCPCS: Performed by: NURSE ANESTHETIST, CERTIFIED REGISTERED

## 2024-06-25 PROCEDURE — 88342 IMHCHEM/IMCYTCHM 1ST ANTB: CPT | Performed by: STUDENT IN AN ORGANIZED HEALTH CARE EDUCATION/TRAINING PROGRAM

## 2024-06-25 PROCEDURE — 81025 URINE PREGNANCY TEST: CPT | Performed by: INTERNAL MEDICINE

## 2024-06-25 PROCEDURE — 25000003 PHARM REV CODE 250: Performed by: NURSE ANESTHETIST, CERTIFIED REGISTERED

## 2024-06-25 PROCEDURE — 27201012 HC FORCEPS, HOT/COLD, DISP: Performed by: INTERNAL MEDICINE

## 2024-06-25 PROCEDURE — 43239 EGD BIOPSY SINGLE/MULTIPLE: CPT | Performed by: INTERNAL MEDICINE

## 2024-06-25 PROCEDURE — 45385 COLONOSCOPY W/LESION REMOVAL: CPT | Performed by: INTERNAL MEDICINE

## 2024-06-25 PROCEDURE — 45380 COLONOSCOPY AND BIOPSY: CPT | Mod: ,,, | Performed by: INTERNAL MEDICINE

## 2024-06-25 PROCEDURE — 88305 TISSUE EXAM BY PATHOLOGIST: CPT | Mod: 59 | Performed by: STUDENT IN AN ORGANIZED HEALTH CARE EDUCATION/TRAINING PROGRAM

## 2024-06-25 PROCEDURE — 37000008 HC ANESTHESIA 1ST 15 MINUTES: Performed by: INTERNAL MEDICINE

## 2024-06-25 PROCEDURE — 43239 EGD BIOPSY SINGLE/MULTIPLE: CPT | Mod: 51,,, | Performed by: INTERNAL MEDICINE

## 2024-06-25 PROCEDURE — 37000009 HC ANESTHESIA EA ADD 15 MINS: Performed by: INTERNAL MEDICINE

## 2024-06-25 RX ORDER — LIDOCAINE HYDROCHLORIDE 20 MG/ML
INJECTION INTRAVENOUS
Status: DISCONTINUED | OUTPATIENT
Start: 2024-06-25 | End: 2024-06-25

## 2024-06-25 RX ORDER — DEXMEDETOMIDINE HYDROCHLORIDE 100 UG/ML
INJECTION, SOLUTION INTRAVENOUS
Status: DISCONTINUED | OUTPATIENT
Start: 2024-06-25 | End: 2024-06-25

## 2024-06-25 RX ORDER — SODIUM CHLORIDE 9 MG/ML
INJECTION, SOLUTION INTRAVENOUS CONTINUOUS
Status: DISCONTINUED | OUTPATIENT
Start: 2024-06-25 | End: 2024-06-25 | Stop reason: HOSPADM

## 2024-06-25 RX ORDER — PROPOFOL 10 MG/ML
VIAL (ML) INTRAVENOUS CONTINUOUS PRN
Status: DISCONTINUED | OUTPATIENT
Start: 2024-06-25 | End: 2024-06-25

## 2024-06-25 RX ADMIN — DEXMEDETOMIDINE 12 MCG: 100 INJECTION, SOLUTION, CONCENTRATE INTRAVENOUS at 02:06

## 2024-06-25 RX ADMIN — SODIUM CHLORIDE: 0.9 INJECTION, SOLUTION INTRAVENOUS at 02:06

## 2024-06-25 RX ADMIN — PROPOFOL 30 MG: 10 INJECTION, EMULSION INTRAVENOUS at 02:06

## 2024-06-25 RX ADMIN — LIDOCAINE HYDROCHLORIDE 100 MG: 20 INJECTION INTRAVENOUS at 02:06

## 2024-06-25 RX ADMIN — PROPOFOL 250 MCG/KG/MIN: 10 INJECTION, EMULSION INTRAVENOUS at 02:06

## 2024-06-25 RX ADMIN — GLYCOPYRROLATE 0.2 MG: 0.2 INJECTION, SOLUTION INTRAMUSCULAR; INTRAVENOUS at 02:06

## 2024-06-25 RX ADMIN — PROPOFOL 50 MG: 10 INJECTION, EMULSION INTRAVENOUS at 02:06

## 2024-06-25 RX ADMIN — PROPOFOL 150 MG: 10 INJECTION, EMULSION INTRAVENOUS at 02:06

## 2024-06-25 NOTE — ANESTHESIA POSTPROCEDURE EVALUATION
Anesthesia Post Evaluation    Patient: Kiya Renae    Procedure(s) Performed: Procedure(s) (LRB):  EGD (ESOPHAGOGASTRODUODENOSCOPY) (N/A)  COLONOSCOPY (N/A)    Final Anesthesia Type: general      Patient location during evaluation: PACU  Patient participation: Yes- Able to Participate  Level of consciousness: awake and alert  Post-procedure vital signs: reviewed and stable  Pain management: adequate  Airway patency: patent    PONV status at discharge: No PONV  Anesthetic complications: no      Cardiovascular status: blood pressure returned to baseline  Respiratory status: unassisted  Follow-up not needed.              Vitals Value Taken Time   /72 06/25/24 1523   Temp 36.7 °C (98.1 °F) 06/25/24 1500   Pulse 60 06/25/24 1523   Resp 18 06/25/24 1523   SpO2 100 % 06/25/24 1523         No case tracking events are documented in the log.      Pain/Trinity Score: Trinity Score: 10 (6/25/2024  3:15 PM)

## 2024-06-25 NOTE — PROVATION PATIENT INSTRUCTIONS
Discharge Summary/Instructions after an Endoscopic Procedure  Patient Name: Kiya Renae  Patient MRN: [id number]  Patient YOB: 2000 Tuesday, June 25, 2024  Jose Yoon MD  Dear patient,  As a result of recent federal legislation (The Federal Cures Act), you may   receive lab or pathology results from your procedure in your [facility]   account before your physician is able to contact you. Your physician or   their representative will relay the results to you with their   recommendations at their soonest availability.  Thank you,  RESTRICTIONS:  During your procedure today, you received medications for sedation.  These   medications may affect your judgment, balance and coordination.  Therefore,   for 24 hours, you have the following restrictions:   - DO NOT drive a car, operate machinery, make legal/financial decisions,   sign important papers or drink alcohol.    ACTIVITY:  Today: no heavy lifting, straining or running due to procedural   sedation/anesthesia.  The following day: return to full activity including work.  DIET:  Eat and drink normally unless instructed otherwise.     TREATMENT FOR COMMON SIDE EFFECTS:  - Mild abdominal pain, nausea, belching, bloating or excessive gas:  rest,   eat lightly and use a heating pad.  - Sore Throat: treat with throat lozenges and/or gargle with warm salt   water.  - Because air was used during the procedure, expelling large amounts of air   from your rectum or belching is normal.  - If a bowel prep was taken, you may not have a bowel movement for 1-3 days.    This is normal.  SYMPTOMS TO WATCH FOR AND REPORT TO YOUR PHYSICIAN:  1. Abdominal pain or bloating, other than gas cramps.  2. Chest pain.  3. Back pain.  4. Signs of infection such as: chills or fever occurring within 24 hours   after the procedure.  5. Rectal bleeding, which would show as bright red, maroon, or black stools.   (A tablespoon of blood from the rectum is not serious, especially if    hemorrhoids are present.)  6. Vomiting.  7. Weakness or dizziness.  GO DIRECTLY TO THE NEAREST EMERGENCY ROOM IF YOU HAVE ANY OF THE FOLLOWING:      Difficulty breathing              Chills and/or fever over 101 F   Persistent vomiting and/or vomiting blood   Severe abdominal pain   Severe chest pain   Black, tarry stools   Bleeding- more than one tablespoon   Any other symptom or condition that you feel may need urgent attention  Your doctor recommends these additional instructions:  If any biopsies were taken, your doctors clinic will contact you in 1 to 2   weeks with any results.  - Discharge patient to home.   - Patient has a contact number available for emergencies.  The signs and   symptoms of potential delayed complications were discussed with the   patient.  Return to normal activities tomorrow.  Written discharge   instructions were provided to the patient.   - Resume previous diet.   - Continue present medications.   - Await pathology results.   - Repeat colonoscopy per age and family history of CRC for screening   purposes.  For questions, problems or results please call your physician - Jose Yoon MD at Work:  [phone].  [facility], EMERGENCY ROOM PHONE NUMBER: [phone]  IF A COMPLICATION OR EMERGENCY SITUATION ARISES AND YOU ARE UNABLE TO REACH   YOUR PHYSICIAN - GO DIRECTLY TO THE EMERGENCY ROOM.  Jose Yoon MD  6/25/2024 3:01:06 PM  This report has been verified and signed electronically.  Dear patient,  As a result of recent federal legislation (The Federal Cures Act), you may   receive lab or pathology results from your procedure in your [facility]   account before your physician is able to contact you. Your physician or   their representative will relay the results to you with their   recommendations at their soonest availability.  Thank you,  PROVATION

## 2024-06-25 NOTE — TRANSFER OF CARE
"Anesthesia Transfer of Care Note    Patient: Kiya Renae    Procedure(s) Performed: Procedure(s) (LRB):  EGD (ESOPHAGOGASTRODUODENOSCOPY) (N/A)  COLONOSCOPY (N/A)    Patient location: GI    Anesthesia Type: general    Transport from OR: Transported from OR on room air with adequate spontaneous ventilation    Post pain: adequate analgesia    Post assessment: no apparent anesthetic complications and tolerated procedure well    Post vital signs: stable    Level of consciousness: sedated    Nausea/Vomiting: no nausea/vomiting    Complications: none    Transfer of care protocol was followed      Last vitals: Visit Vitals  BP (!) 95/53   Pulse 67   Temp 36.7 °C (98.1 °F)   Resp 18   Ht 5' 8" (1.727 m)   Wt 72.6 kg (160 lb)   SpO2 100%   Breastfeeding No   BMI 24.33 kg/m²     "

## 2024-06-25 NOTE — PLAN OF CARE
Plan of care reviewed with patient. Understanding verbalized.      What Type Of Note Output Would You Prefer (Optional)?: Standard Output How Severe Is Your Acne?: severe Is This A New Presentation, Or A Follow-Up?: Acne

## 2024-06-25 NOTE — ANESTHESIA PREPROCEDURE EVALUATION
06/25/2024  Kiya Renae is a 24 y.o., female.  Medical History    Diagnosis Date Comment Source   Allergy      Anxiety 07/31/2021     Arthritis 12/30/2021 PsA    IBD (inflammatory bowel disease)      Joint pain      Lyme disease, unspecified      Psoriasis      Skin disease 12/30/2021 psorias on scalp, behind ears, and inside ear canal    Subclinical hypothyroidism 07/05/2023       r to Admission Medications      Taking? Last Dose Start Date End Date Provider  azelastine (ASTELIN) 137 mcg (0.1 %) nasal spray   --  04/17/24 04/17/25  Luzmaria Sanon MD  1 spray (137 mcg total) by Nasal route 2 (two) times daily.  clobetasoL (TEMOVATE) 0.05 % external solution   --  04/26/23  --  Mark Myrick MD  Apply topically once daily. Use to affected areas for up to 2 weeks then take a 1 week break or decrease to 3 times weekly. Do not apply to groin or face  dicyclomine (BENTYL) 20 mg tablet   --  07/05/23  --  Nicol Shell MD  Take 1 tablet (20 mg total) by mouth every 8 (eight) hours as needed (abdominal cramping).  ergocalciferol (VITAMIN D2) 50,000 unit Cap   --  06/05/24  --  Helen Rose NP  Take 1 capsule (50,000 Units total) by mouth every 7 days.  fluocinolone acetonide oiL 0.01 % Drop   --  04/26/23  --  Mark Myrick MD  Place 1 application in ear(s) once daily. Use to affected areas for up to 2 weeks then take a 1 week break or decrease to 3 times weekly. For use in ears  fluticasone propionate (FLONASE) 50 mcg/actuation nasal spray   --  04/17/24  --  Luzmaria Sanon MD  2 sprays (100 mcg total) by Each Nostril route once daily.  gabapentin (NEURONTIN) 100 MG capsule   --  --  --  Provider, Historical  methylPREDNISolone (MEDROL DOSEPACK) 4 mg tablet   --  06/10/24  --  Filemon Banuelos MD  use as directed  metoclopramide HCl (REGLAN) 10 MG tablet   --   05/21/24  --  Diogenes Jiang MD  Take 1 tablet (10 mg total) by mouth every 6 (six) hours as needed (Nausea and vomiting).  ondansetron (ZOFRAN) 8 MG tablet   --  --  --  Provider, Historical  pantoprazole (PROTONIX) 40 MG tablet   --  05/08/23  --  Nicol Shell MD  Take 1 tablet (40 mg total) by mouth once daily.  polyethylene glycol (GLYCOLAX) 17 gram/dose powder   --  05/09/24  --  Emili Sims FNP  Take 17 g by mouth once daily. Mix with 8 oz of water/liquid.  propranoloL (INDERAL LA) 80 MG 24 hr capsule   --  04/16/24  --  Nicol Shell MD  Take 1 capsule (80 mg total) by mouth once daily.  rimegepant (NURTEC) 75 mg odt   --  --  --  Provider, Historical  risankizumab-rzaa (SKYRIZI) 150 mg/mL PnIj   --  --  --  Provider, Historical  sertraline 200 mg Cap   --  --  --  Provider, Historical  topiramate (TOPAMAX) 25 MG tablet   --  11/15/23  --  Nicol Shell MD  Take 1 tablet (25 mg total) by mouth once daily.  tretinoin (RETIN-A) 0.05 % cream   --  03/11/24  --  Mark Myrick MD  Apply topically every evening. Start twice weekly and increase as tolerated. Pea sized amount to entire face.  triamcinolone acetonide injection 10 mg   --  05/31/24  --  Mark Myrick MD  10 mg, Intradermal, Once, On Fri 5/31/24 at 1045, For 1 dose  Pre-op Assessment    I have reviewed the Patient Summary Reports.    I have reviewed the NPO Status.   I have reviewed the Medications.     Review of Systems  Anesthesia Hx:  No problems with previous Anesthesia             Denies Family Hx of Anesthesia complications.    Denies Personal Hx of Anesthesia complications.                    Hematology/Oncology:  Hematology Normal   Oncology Normal                                   EENT/Dental:  EENT/Dental Normal           Cardiovascular:  Cardiovascular Normal Exercise tolerance: good                                           Renal/:  Renal/ Normal                 Hepatic/GI:  Hepatic/GI Normal                  Musculoskeletal:  Musculoskeletal Normal                Neurological:  Neurology Normal                                      Endocrine:  Endocrine Normal            Dermatological:  Skin Normal    Psych:  Psychiatric Normal                    Physical Exam  General: Well nourished, Cooperative, Alert and Oriented    Airway:  Mallampati: II   Mouth Opening: Normal  TM Distance: Normal  Neck ROM: Normal ROM    Dental:  Intact        Anesthesia Plan  Type of Anesthesia, risks & benefits discussed:    Anesthesia Type: Gen Natural Airway  Intra-op Monitoring Plan: Standard ASA Monitors  Post Op Pain Control Plan: multimodal analgesia  Induction:  IV  Informed Consent: Informed consent signed with the Patient and all parties understand the risks and agree with anesthesia plan.  All questions answered.   ASA Score: 2  Day of Surgery Review of History & Physical: H&P Update referred to the surgeon/provider.    Ready For Surgery From Anesthesia Perspective.     .

## 2024-06-25 NOTE — PROVATION PATIENT INSTRUCTIONS
Discharge Summary/Instructions after an Endoscopic Procedure  Patient Name: Kiya Renae  Patient MRN: [id number]  Patient YOB: 2000 Tuesday, June 25, 2024  Jose Yoon MD  Dear patient,  As a result of recent federal legislation (The Federal Cures Act), you may   receive lab or pathology results from your procedure in your [facility]   account before your physician is able to contact you. Your physician or   their representative will relay the results to you with their   recommendations at their soonest availability.  Thank you,  RESTRICTIONS:  During your procedure today, you received medications for sedation.  These   medications may affect your judgment, balance and coordination.  Therefore,   for 24 hours, you have the following restrictions:   - DO NOT drive a car, operate machinery, make legal/financial decisions,   sign important papers or drink alcohol.    ACTIVITY:  Today: no heavy lifting, straining or running due to procedural   sedation/anesthesia.  The following day: return to full activity including work.  DIET:  Eat and drink normally unless instructed otherwise.     TREATMENT FOR COMMON SIDE EFFECTS:  - Mild abdominal pain, nausea, belching, bloating or excessive gas:  rest,   eat lightly and use a heating pad.  - Sore Throat: treat with throat lozenges and/or gargle with warm salt   water.  - Because air was used during the procedure, expelling large amounts of air   from your rectum or belching is normal.  - If a bowel prep was taken, you may not have a bowel movement for 1-3 days.    This is normal.  SYMPTOMS TO WATCH FOR AND REPORT TO YOUR PHYSICIAN:  1. Abdominal pain or bloating, other than gas cramps.  2. Chest pain.  3. Back pain.  4. Signs of infection such as: chills or fever occurring within 24 hours   after the procedure.  5. Rectal bleeding, which would show as bright red, maroon, or black stools.   (A tablespoon of blood from the rectum is not serious, especially if    hemorrhoids are present.)  6. Vomiting.  7. Weakness or dizziness.  GO DIRECTLY TO THE NEAREST EMERGENCY ROOM IF YOU HAVE ANY OF THE FOLLOWING:      Difficulty breathing              Chills and/or fever over 101 F   Persistent vomiting and/or vomiting blood   Severe abdominal pain   Severe chest pain   Black, tarry stools   Bleeding- more than one tablespoon   Any other symptom or condition that you feel may need urgent attention  Your doctor recommends these additional instructions:  If any biopsies were taken, your doctors clinic will contact you in 1 to 2   weeks with any results.  - Discharge patient to home.   - Discharge patient to home.   - Resume previous diet.   - Continue present medications.   - Await pathology results.   - Perform a colonoscopy today.  For questions, problems or results please call your physician - Jose Yoon MD at Work:  [phone].  [facility], EMERGENCY ROOM PHONE NUMBER: [phone]  IF A COMPLICATION OR EMERGENCY SITUATION ARISES AND YOU ARE UNABLE TO REACH   YOUR PHYSICIAN - GO DIRECTLY TO THE EMERGENCY ROOM.  Jose Yoon MD  6/25/2024 2:32:22 PM  This report has been verified and signed electronically.  Dear patient,  As a result of recent federal legislation (The Federal Cures Act), you may   receive lab or pathology results from your procedure in your [facility]   account before your physician is able to contact you. Your physician or   their representative will relay the results to you with their   recommendations at their soonest availability.  Thank you,  PROVATION

## 2024-06-27 NOTE — H&P
Short Stay Endoscopy History and Physical    PCP - Nicol Shell MD  Referring Physician - Jose Yoon MD  [address] [place] [address]    Procedure - EGD and Colonoscopy  ASA - per anesthesia  Mallampati - per anesthesia  History of Anesthesia problems - no  Family history Anesthesia problems -  no   Plan of anesthesia - General    HPI  24 y.o. female    Reason for procedure:   Abnormal CT and IBD serology positive and concerns for Crohns' disease due to diarrhea and CT findings    ROS:  Constitutional: No fevers, chills, No weight loss  CV: No chest pain  Pulm: No cough, No shortness of breath  GI: see HPI    Medical History:  has a past medical history of Allergy, Anxiety (07/31/2021), Arthritis (12/30/2021), IBD (inflammatory bowel disease), Joint pain, Lyme disease, unspecified, Psoriasis, Skin disease (12/30/2021), and Subclinical hypothyroidism (07/05/2023).    Surgical History:  has a past surgical history that includes Colonoscopy (N/A, 08/22/2023); Upper gastrointestinal endoscopy (05/2023); Esophagogastroduodenoscopy (N/A, 6/25/2024); and Colonoscopy (N/A, 6/25/2024).    Family History: family history includes Acne in her father; Alcohol abuse in her father and mother; Asthma in her mother; Breast cancer in her paternal aunt and paternal grandmother; Crohn's disease in her paternal aunt; Depression in her brother; Diabetes in her maternal grandmother; Eczema in her brother; Heart attack in her paternal grandmother; Heart failure in her maternal grandfather; Lupus in her paternal aunt; Migraines in her father and paternal aunt; Psoriasis in her mother; Rheum arthritis in her mother; Suicidality in her brother.    Social History:  reports that she has never smoked. She has never been exposed to tobacco smoke. She has never used smokeless tobacco. She reports that she does not currently use alcohol. She reports that she does not use drugs.    Review of patient's allergies indicates:    Allergen Reactions    Azithromycin Hives, Rash and Swelling       Medications:   No medications prior to admission.       Physical Exam:    Vital Signs:   Vitals:    06/25/24 1523   BP: 115/72   Pulse: 60   Resp: 18   Temp:        General Appearance: Well appearing in no acute distress  Abdomen: Soft, non tender, non distended with normal bowel sounds, no masses    Labs:  Lab Results   Component Value Date    WBC 6.22 06/04/2024    HGB 13.0 06/04/2024    HCT 39.2 06/04/2024     06/04/2024    CHOL 182 04/24/2023    TRIG 77 04/24/2023    HDL 69 04/24/2023    ALT 13 06/04/2024    ALT 13 06/04/2024    AST 15 06/04/2024    AST 15 06/04/2024     06/04/2024    K 4.4 06/04/2024     06/04/2024    CREATININE 1.0 06/04/2024    BUN 20 06/04/2024    CO2 27 06/04/2024    TSH 2.444 12/13/2023    HGBA1C 5.1 04/24/2023       I have explained the risks and benefits of this endoscopic procedure to the patient including but not limited to bleeding, inflammation, infection, perforation, and death.      Jose Yoon MD

## 2024-07-02 LAB
FINAL PATHOLOGIC DIAGNOSIS: NORMAL
GROSS: NORMAL
Lab: NORMAL
MICROSCOPIC EXAM: NORMAL

## 2024-07-03 ENCOUNTER — PATIENT MESSAGE (OUTPATIENT)
Dept: GASTROENTEROLOGY | Facility: CLINIC | Age: 24
End: 2024-07-03
Payer: COMMERCIAL

## 2024-07-15 ENCOUNTER — PATIENT MESSAGE (OUTPATIENT)
Dept: OTOLARYNGOLOGY | Facility: CLINIC | Age: 24
End: 2024-07-15
Payer: COMMERCIAL

## 2024-08-06 ENCOUNTER — TELEPHONE (OUTPATIENT)
Dept: ENDOSCOPY | Facility: HOSPITAL | Age: 24
End: 2024-08-06
Payer: COMMERCIAL

## 2024-08-06 ENCOUNTER — LAB VISIT (OUTPATIENT)
Dept: LAB | Facility: HOSPITAL | Age: 24
End: 2024-08-06
Payer: COMMERCIAL

## 2024-08-06 ENCOUNTER — OFFICE VISIT (OUTPATIENT)
Dept: GASTROENTEROLOGY | Facility: CLINIC | Age: 24
End: 2024-08-06
Payer: COMMERCIAL

## 2024-08-06 VITALS
HEIGHT: 68 IN | DIASTOLIC BLOOD PRESSURE: 71 MMHG | BODY MASS INDEX: 24.39 KG/M2 | HEART RATE: 62 BPM | WEIGHT: 160.94 LBS | SYSTOLIC BLOOD PRESSURE: 113 MMHG

## 2024-08-06 DIAGNOSIS — R19.7 DIARRHEA, UNSPECIFIED TYPE: ICD-10-CM

## 2024-08-06 DIAGNOSIS — K92.1 HEMATOCHEZIA: ICD-10-CM

## 2024-08-06 DIAGNOSIS — R93.89 ABNORMAL FINDING ON CT SCAN: ICD-10-CM

## 2024-08-06 DIAGNOSIS — R19.8 PAIN ASSOCIATED WITH DEFECATION: ICD-10-CM

## 2024-08-06 DIAGNOSIS — K92.1 HEMATOCHEZIA: Primary | ICD-10-CM

## 2024-08-06 LAB
BASOPHILS # BLD AUTO: 0.04 K/UL (ref 0–0.2)
BASOPHILS NFR BLD: 0.4 % (ref 0–1.9)
CRP SERPL-MCNC: <0.3 MG/L (ref 0–8.2)
DIFFERENTIAL METHOD BLD: ABNORMAL
EOSINOPHIL # BLD AUTO: 0.2 K/UL (ref 0–0.5)
EOSINOPHIL NFR BLD: 2.1 % (ref 0–8)
ERYTHROCYTE [DISTWIDTH] IN BLOOD BY AUTOMATED COUNT: 12.2 % (ref 11.5–14.5)
HCT VFR BLD AUTO: 40.2 % (ref 37–48.5)
HGB BLD-MCNC: 13.3 G/DL (ref 12–16)
IMM GRANULOCYTES # BLD AUTO: 0.04 K/UL (ref 0–0.04)
IMM GRANULOCYTES NFR BLD AUTO: 0.4 % (ref 0–0.5)
IRON SERPL-MCNC: 80 UG/DL (ref 30–160)
LYMPHOCYTES # BLD AUTO: 1.4 K/UL (ref 1–4.8)
LYMPHOCYTES NFR BLD: 15 % (ref 18–48)
MCH RBC QN AUTO: 30.2 PG (ref 27–31)
MCHC RBC AUTO-ENTMCNC: 33.1 G/DL (ref 32–36)
MCV RBC AUTO: 91 FL (ref 82–98)
MONOCYTES # BLD AUTO: 1 K/UL (ref 0.3–1)
MONOCYTES NFR BLD: 11 % (ref 4–15)
NEUTROPHILS # BLD AUTO: 6.6 K/UL (ref 1.8–7.7)
NEUTROPHILS NFR BLD: 71.1 % (ref 38–73)
NRBC BLD-RTO: 0 /100 WBC
PLATELET # BLD AUTO: 235 K/UL (ref 150–450)
PMV BLD AUTO: 12.9 FL (ref 9.2–12.9)
RBC # BLD AUTO: 4.4 M/UL (ref 4–5.4)
SATURATED IRON: 19 % (ref 20–50)
TOTAL IRON BINDING CAPACITY: 431 UG/DL (ref 250–450)
TRANSFERRIN SERPL-MCNC: 291 MG/DL (ref 200–375)
WBC # BLD AUTO: 9.21 K/UL (ref 3.9–12.7)

## 2024-08-06 PROCEDURE — 86140 C-REACTIVE PROTEIN: CPT

## 2024-08-06 PROCEDURE — 85025 COMPLETE CBC W/AUTO DIFF WBC: CPT

## 2024-08-06 PROCEDURE — 36415 COLL VENOUS BLD VENIPUNCTURE: CPT

## 2024-08-06 PROCEDURE — 3074F SYST BP LT 130 MM HG: CPT | Mod: CPTII,S$GLB,,

## 2024-08-06 PROCEDURE — 3078F DIAST BP <80 MM HG: CPT | Mod: CPTII,S$GLB,,

## 2024-08-06 PROCEDURE — 1159F MED LIST DOCD IN RCRD: CPT | Mod: CPTII,S$GLB,,

## 2024-08-06 PROCEDURE — 83540 ASSAY OF IRON: CPT

## 2024-08-06 PROCEDURE — 3008F BODY MASS INDEX DOCD: CPT | Mod: CPTII,S$GLB,,

## 2024-08-06 PROCEDURE — 99214 OFFICE O/P EST MOD 30 MIN: CPT | Mod: S$GLB,,,

## 2024-08-06 PROCEDURE — 99999 PR PBB SHADOW E&M-EST. PATIENT-LVL IV: CPT | Mod: PBBFAC,,,

## 2024-08-06 RX ORDER — RISANKIZUMAB-RZAA 360 MG/2.4
WEARABLE INJECTOR SUBCUTANEOUS
COMMUNITY
Start: 2024-06-24

## 2024-08-07 ENCOUNTER — HOSPITAL ENCOUNTER (OUTPATIENT)
Dept: RADIOLOGY | Facility: HOSPITAL | Age: 24
Discharge: HOME | End: 2024-08-07
Payer: COMMERCIAL

## 2024-08-07 DIAGNOSIS — K92.1 HEMATOCHEZIA: ICD-10-CM

## 2024-08-07 PROCEDURE — 72197 MRI PELVIS W/O & W/DYE: CPT | Mod: TC

## 2024-08-07 PROCEDURE — 25500020 PHARM REV CODE 255

## 2024-08-07 PROCEDURE — A9585 GADOBUTROL INJECTION: HCPCS

## 2024-08-07 PROCEDURE — 74183 MRI ABD W/O CNTR FLWD CNTR: CPT | Mod: 26,,, | Performed by: INTERNAL MEDICINE

## 2024-08-07 PROCEDURE — 72197 MRI PELVIS W/O & W/DYE: CPT | Mod: 26,,, | Performed by: INTERNAL MEDICINE

## 2024-08-07 PROCEDURE — 74183 MRI ABD W/O CNTR FLWD CNTR: CPT | Mod: TC

## 2024-08-07 RX ORDER — GADOBUTROL 604.72 MG/ML
10 INJECTION INTRAVENOUS
Status: COMPLETED | OUTPATIENT
Start: 2024-08-07 | End: 2024-08-07

## 2024-08-07 RX ADMIN — GADOBUTROL 10 ML: 604.72 INJECTION INTRAVENOUS at 02:08

## 2024-08-13 ENCOUNTER — OFFICE VISIT (OUTPATIENT)
Dept: RHEUMATOLOGY | Facility: CLINIC | Age: 24
End: 2024-08-13
Payer: COMMERCIAL

## 2024-08-13 ENCOUNTER — PATIENT MESSAGE (OUTPATIENT)
Dept: GASTROENTEROLOGY | Facility: CLINIC | Age: 24
End: 2024-08-13
Payer: COMMERCIAL

## 2024-08-13 VITALS
DIASTOLIC BLOOD PRESSURE: 81 MMHG | HEART RATE: 67 BPM | SYSTOLIC BLOOD PRESSURE: 121 MMHG | HEIGHT: 68 IN | BODY MASS INDEX: 24.72 KG/M2 | WEIGHT: 163.13 LBS

## 2024-08-13 DIAGNOSIS — D84.821 DRUG-INDUCED IMMUNODEFICIENCY: ICD-10-CM

## 2024-08-13 DIAGNOSIS — K50.90 CROHN'S DISEASE WITHOUT COMPLICATION, UNSPECIFIED GASTROINTESTINAL TRACT LOCATION: ICD-10-CM

## 2024-08-13 DIAGNOSIS — Z79.899 DRUG-INDUCED IMMUNODEFICIENCY: ICD-10-CM

## 2024-08-13 DIAGNOSIS — L40.50 PSORIATIC ARTHRITIS: Primary | ICD-10-CM

## 2024-08-13 DIAGNOSIS — R25.1 TREMOR: ICD-10-CM

## 2024-08-13 PROCEDURE — 1159F MED LIST DOCD IN RCRD: CPT | Mod: CPTII,S$GLB,, | Performed by: STUDENT IN AN ORGANIZED HEALTH CARE EDUCATION/TRAINING PROGRAM

## 2024-08-13 PROCEDURE — 3074F SYST BP LT 130 MM HG: CPT | Mod: CPTII,S$GLB,, | Performed by: STUDENT IN AN ORGANIZED HEALTH CARE EDUCATION/TRAINING PROGRAM

## 2024-08-13 PROCEDURE — 3008F BODY MASS INDEX DOCD: CPT | Mod: CPTII,S$GLB,, | Performed by: STUDENT IN AN ORGANIZED HEALTH CARE EDUCATION/TRAINING PROGRAM

## 2024-08-13 PROCEDURE — 99999 PR PBB SHADOW E&M-EST. PATIENT-LVL IV: CPT | Mod: PBBFAC,,, | Performed by: STUDENT IN AN ORGANIZED HEALTH CARE EDUCATION/TRAINING PROGRAM

## 2024-08-13 PROCEDURE — 99214 OFFICE O/P EST MOD 30 MIN: CPT | Mod: S$GLB,,, | Performed by: STUDENT IN AN ORGANIZED HEALTH CARE EDUCATION/TRAINING PROGRAM

## 2024-08-13 PROCEDURE — 3079F DIAST BP 80-89 MM HG: CPT | Mod: CPTII,S$GLB,, | Performed by: STUDENT IN AN ORGANIZED HEALTH CARE EDUCATION/TRAINING PROGRAM

## 2024-08-13 NOTE — PROGRESS NOTES
RHEUMATOLOGY OUTPATIENT CLINIC NOTE    8/13/2024    Attending Rheumatologist: Trice Coombs  Primary Care Provider: Nicol hSell MD   Physician Requesting Consultation: No referring provider defined for this encounter.  Chief Complaint/Reason For Consultation:  Psoriatic Arthritis      Subjective:       HPI  Kiya Renae is a 24 y.o. White female with Crohn's Disease, tremors who comes for evaluation of joint pain.     Interim history  -Last visit on 5/2024  -Follows with GI/IBD clinic - had EGD/colonoscopy did not show active Crohn's. But she is still having hematochezia. Plan to have MRE and referral to colorectal surgeon to evaluate for rectal causes of pain/bleeding.   -Established care with Dr. Myrick for scalp psoriasis, using topicals.   -Had traumatic head injury while practicing [employer]. Was prescribed medrol dose pack but never used it. It improved.   -labs from 6/2024 showed negative quantiferon, hepatitis B serologies. CBC normal. Normal liver function. CRP normal.   -having some tremors in hands and left leg for the past week, and also pain in bilateral 2nd and 3rd fingers, with mild swelling. Has been taking tylenol.   Denies any prolonged morning stiffness   -she is receiving skyrizi from her GI in [place], no issues with refills so far.     Disease history    Used to follow with rheumatology in [place]. Records reviewed. Last visit in 7/2022.   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 Negative serologies. Methotrexate caused nausea. Failed nabumetone.   Otezla - developed increased depression and GI upset.   Skyrizi - significant improvement of joint and skin disease  Then, diagnosed with Crohn's Disease - skyrizi dose was increased to IBD dose. This was in 8/2023 by GI in [place].   Colonoscopy in 8/2023 reportedly normal. However GI from [place] felt that there were some active lesions from pictures on colonoscopy.     She has history of lyme disease. Was on minocycline  for a while. Stopped about a year and a half.     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   Has been dealing with different neurological issues. Significant tremor in left hand along with left leg numbness at times. Sees neurology. So far, workup has been unrevealing. MRI brain and c-spine are essentially unremarkable. EMG/NCS of left upper and lower extremity was normal. Autoimmune movement disorder panel is negative. Symptoms improve with propanolol high doses which she is able to tolerate. She is concerned that this could be related to MS.     Her psoriasis and joint pain have been stable. No more dactylitis. Occasional low back pain. No eye inflammation.   PCP prescribed diflucan for possible fungal infection as she has been dealing with cracked lip commissure. Also feels that her sinuses are clogged. She notes that this happens almost at the end of the skyrizi cycle, and symptoms slowly start to improve afterwards.   She is due for skyrizi tomorrow. Receives refills from her GI in [place]  She reports that last week she had one episode of bloody stool and since then has constipation. She has not tried any OTC medications.       12/2023  MELYSSA negative  Anti histone 1.1 (<0.9)  Negative DSDNA, SSA, SSB, RNA eula 3, Scl-70, SM/RNP, TH/To  RF, CCP negative  Extended myositis panel negative    9/2023  Lyme antibodies negative  TPO negative  ESR and CRP negative    Chronic comorbid conditions affecting medical decision making today:  Past Medical History:   Diagnosis Date    Allergy     Anxiety 07/31/2021    Arthritis 12/30/2021    PsA    IBD (inflammatory bowel disease)     Joint pain     Lyme disease, unspecified     Psoriasis     Skin disease 12/30/2021    psorias on scalp, behind ears, and inside ear canal    Subclinical hypothyroidism 07/05/2023     Past Surgical History:   Procedure Laterality Date    COLONOSCOPY N/A 08/22/2023    Procedure: COLONOSCOPY;  Surgeon: Stanton Ferrell MD;   Location: [facility] END[facility];  Service: Endoscopy;  Laterality: N/A;    COLONOSCOPY N/A 6/25/2024    Procedure: COLONOSCOPY;  Surgeon: Jose Yoon MD;  Location: [facility] (4TH FLR);  Service: Endoscopy;  Laterality: N/A;    ESOPHAGOGASTRODUODENOSCOPY N/A 6/25/2024    Procedure: EGD (ESOPHAGOGASTRODUODENOSCOPY);  Surgeon: Jose Yoon MD;  Location: [facility] (4TH FLR);  Service: Endoscopy;  Laterality: N/A;  urgent case/ per np a labat / ok to add per alfonso/ prep instructions given in clinic / peg prep  6/24-precall complete, pt notified of earlier arrival time (12:20pm)-[facility]    UPPER GASTROINTESTINAL ENDOSCOPY  05/2023    in [place], minor swelling to lower esophagus per patient report     Family History   Problem Relation Name Age of Onset    Alcohol abuse Mother Olga Renae     Asthma Mother Olga Renae     Rheum arthritis Mother Olga Renae     Psoriasis Mother Olga Renae     Alcohol abuse Father Adrián Renae Jr     Migraines Father Adrián Renae Jr     Acne Father Adrián Renae Jr     Depression Brother Omariraleigh Renae     Suicidality Brother Omari Oc     Eczema Brother Omari Oc     Crohn's disease Paternal Aunt Jennifer Cranor     Breast cancer Paternal Aunt Jennifer Cranor     Lupus Paternal Aunt Jennifer Cranor     Migraines Paternal Aunt Jennifer Cranor     Diabetes Maternal Grandmother      Heart failure Maternal Grandfather      Breast cancer Paternal Grandmother      Heart attack Paternal Grandmother      Colon cancer Neg Hx      Esophageal cancer Neg Hx      Stomach cancer Neg Hx       Social History     Substance and Sexual Activity   Alcohol Use Not Currently    Comment: only socially and a very small amount if ever (one or two)     Social History     Tobacco Use   Smoking Status Never    Passive exposure: Never   Smokeless Tobacco Never     Social History     Substance and Sexual Activity   Drug Use Never       Current Outpatient Medications:     azelastine (ASTELIN) 137 mcg (0.1 %) nasal spray, 1 spray (137  mcg total) by Nasal route 2 (two) times daily., Disp: 30 mL, Rfl: 3    clobetasoL (TEMOVATE) 0.05 % external solution, Apply topically once daily. Use to affected areas for up to 2 weeks then take a 1 week break or decrease to 3 times weekly. Do not apply to groin or face, Disp: 50 mL, Rfl: 2    dicyclomine (BENTYL) 20 mg tablet, Take 1 tablet (20 mg total) by mouth every 8 (eight) hours as needed (abdominal cramping)., Disp: 120 tablet, Rfl: 0    ergocalciferol (VITAMIN D2) 50,000 unit Cap, Take 1 capsule (50,000 Units total) by mouth every 7 days., Disp: 8 capsule, Rfl: 0    fluocinolone acetonide oiL 0.01 % Drop, Place 1 application in ear(s) once daily. Use to affected areas for up to 2 weeks then take a 1 week break or decrease to 3 times weekly. For use in ears, Disp: 20 mL, Rfl: 2    fluticasone propionate (FLONASE) 50 mcg/actuation nasal spray, 2 sprays (100 mcg total) by Each Nostril route once daily., Disp: 9.9 mL, Rfl: 11    gabapentin (NEURONTIN) 100 MG capsule, Take 100 mg by mouth daily as needed., Disp: , Rfl:     methylPREDNISolone (MEDROL DOSEPACK) 4 mg tablet, use as directed, Disp: 1 each, Rfl: 0    ondansetron (ZOFRAN) 8 MG tablet, Take 8 mg by mouth every 8 (eight) hours as needed for Nausea., Disp: , Rfl:     pantoprazole (PROTONIX) 40 MG tablet, Take 1 tablet (40 mg total) by mouth once daily., Disp: 30 tablet, Rfl: 3    polyethylene glycol (GLYCOLAX) 17 gram/dose powder, Take 17 g by mouth once daily. Mix with 8 oz of water/liquid., Disp: 510 g, Rfl: 2    propranoloL (INDERAL LA) 80 MG 24 hr capsule, Take 1 capsule (80 mg total) by mouth once daily., Disp: 90 capsule, Rfl: 3    rimegepant (NURTEC) 75 mg odt, Take 75 mg by mouth once as needed for Migraine. Place ODT tablet on the tongue; alternatively the ODT tablet may be placed under the tongue, Disp: , Rfl:     sertraline 200 mg Cap, , Disp: , Rfl:     SKYRIZI 360 mg/2.4 mL (150 mg/mL) Injt, , Disp: , Rfl:     topiramate (TOPAMAX) 25 MG  tablet, Take 1 tablet (25 mg total) by mouth once daily., Disp: 30 tablet, Rfl: 0    tretinoin (RETIN-A) 0.05 % cream, Apply topically every evening. Start twice weekly and increase as tolerated. Pea sized amount to entire face., Disp: 20 g, Rfl: 3    Current Facility-Administered Medications:     triamcinolone acetonide injection 10 mg, 10 mg, Intradermal, Once,      Objective:         Vitals:    08/13/24 0844   BP: 121/81   Pulse: 67       Physical Exam   Constitutional: She is oriented to person, place, and time. She appears well-developed.   HENT:   Head: Normocephalic.   Mouth/Throat: Mucous membranes are moist.   Pulmonary/Chest: Effort normal.   Musculoskeletal:      Cervical back: Neck supple.      Comments: Cspine FROM no tenderness  Tspine FROM no tenderness  Lspine FROM no tenderness.  Shoulders: FROM; no synovitis;  Elbows: FROM; no synovitis; no tophi or nodules  Wrists: FROM; no synovitis;    MCPs: FROM; no synovitis;   PIPs:FROM; no synovitis;   DIPs: FROM; no synovitis;   HIPS: FROM  Knees: FROM; no synovitis; no instability  Ankles: FROM: no synovitis   Toes: no tenderness on palpation.     Lymphadenopathy:     She has no cervical adenopathy.   Neurological: She is alert and oriented to person, place, and time.   Skin: No rash noted.   No skin rashes   Vitals reviewed.      Reviewed old and all outside pertinent medical records available.    All lab results personally reviewed and interpreted by me.  Lab Results   Component Value Date    WBC 9.21 08/06/2024    HGB 13.3 08/06/2024    HCT 40.2 08/06/2024    MCV 91 08/06/2024    MCH 30.2 08/06/2024    MCHC 33.1 08/06/2024    RDW 12.2 08/06/2024     08/06/2024    MPV 12.9 08/06/2024       Lab Results   Component Value Date     06/04/2024    K 4.4 06/04/2024     06/04/2024    CO2 27 06/04/2024    GLU 77 06/04/2024    BUN 20 06/04/2024    CALCIUM 9.7 06/04/2024    PROT 7.1 06/04/2024    PROT 7.1 06/04/2024    ALBUMIN 4.2 06/04/2024     "ALBUMIN 4.2 06/04/2024    BILITOT 0.3 06/04/2024    BILITOT 0.3 06/04/2024    AST 15 06/04/2024    AST 15 06/04/2024    ALKPHOS 54 (L) 06/04/2024    ALKPHOS 54 (L) 06/04/2024    ALT 13 06/04/2024    ALT 13 06/04/2024       Lab Results   Component Value Date    COLORU Yellow 09/05/2023    APPEARANCEUA Clear 09/05/2023    SPECGRAV >1.030 (A) 09/05/2023    PHUR 7.0 09/05/2023    PROTEINUA Trace (A) 09/05/2023    KETONESU Negative 09/05/2023    LEUKOCYTESUR 1+ (A) 09/05/2023    NITRITE Negative 09/05/2023    UROBILINOGEN Negative 09/05/2023       Lab Results   Component Value Date    CRP <0.3 08/06/2024       Lab Results   Component Value Date    RF <13.0 12/13/2023    SEDRATE <2 09/15/2023    CCPANTIBODIE 0.7 12/13/2023       No components found for: "25OHVITDTOT", "78DNBNVM7", "85UVJUQR4", "METHODNOTE"    No results found for: "URICACID"    Lab Results   Component Value Date    HEPBSAG Non-reactive 06/04/2024    HEPCAB Non-reactive 04/24/2023       Imaging:  All imaging reviewed and independently interpreted by me.       ASSESSMENT / PLAN:     Kiya Renae is a 24 y.o. White female with:    1. Psoriatic arthritis  - stable  - continue skyrizi 360 mg every 8 weeks (IBD dose) per GI. She receives refills from her GI in [place]. Advised her to inform her GI and myself if she runs into any problems with refills.   - if worsening, can consider [place] or [place]-12/23, will need to coordinate with GI Would avoid TNF given neurological symptoms for now. Avoid methotrexate due to prior nausea.    2. Drug-induced immunodeficiency  - recent labs reviewed  - no live vaccines  - vaccines per guidelines   - immunosuppression/infectious precautions reinforced      3. Crohn's disease without complication, unspecified gastrointestinal tract location  - appears to be stable on skyrizi every 8 weeks per GI.   - Recent MRE was normal.   - has persistent hematochezia with intermittent pain with defecation. She will be evaluated by colorectal " surgery soon.     4. Tremor  - Unknown etiology. Workup per neurology unrevealing. No evidence of MS, which is patient's biggest concern. MELYSSA negative. Extensive antibody testing has been negative with exception of borderline anti histone which can be positive due to prior use of minocycline.   - controlled on propanolol   - neuro recommended endo follow up due to high TSH   - will have yearly MRIs   - defer management to neurology     Follow up in about 2 months (around 10/13/2024).    Method of contact with patient concerns: [facility]    Disclaimer:  This note is prepared using voice recognition software and as such is likely to have errors and has not been proof read. Please contact me for questions.     Time spent: 30 minutes in face to face discussion concerning diagnosis, prognosis, review of lab and test results, benefits of treatment as well as management of disease, counseling of patient and coordination of care between various health care providers.  Greater than half the time spent was used for coordination of care and counseling of patient.    Trice Coombs M.D.  Rheumatology  [facility]

## 2024-08-15 ENCOUNTER — TELEPHONE (OUTPATIENT)
Dept: SURGERY | Facility: HOSPITAL | Age: 24
End: 2024-08-15
Payer: COMMERCIAL

## 2024-08-15 NOTE — TELEPHONE ENCOUNTER
Good Afternoon. Patient is scheduled for an EGD with Dr. Croft 8/20. When I spoke with her, she is unsure why this procedure is scheduled because she had a colonoscopy and EGD 6/25/24. Also, at her follow-up appointment, she was never told that another EGD was recommended. Please reach out to patient for clarification. Thank you.

## 2024-08-21 ENCOUNTER — TELEPHONE (OUTPATIENT)
Dept: OTOLARYNGOLOGY | Facility: CLINIC | Age: 24
End: 2024-08-21
Payer: COMMERCIAL

## 2024-08-21 DIAGNOSIS — L70.9 ACNE, UNSPECIFIED ACNE TYPE: ICD-10-CM

## 2024-08-21 DIAGNOSIS — R25.1 TREMOR: ICD-10-CM

## 2024-08-21 RX ORDER — PROPRANOLOL HYDROCHLORIDE 80 MG/1
80 CAPSULE, EXTENDED RELEASE ORAL DAILY
Qty: 90 CAPSULE | Refills: 3 | Status: SHIPPED | OUTPATIENT
Start: 2024-08-21

## 2024-08-21 RX ORDER — TRETINOIN 0.5 MG/G
CREAM TOPICAL NIGHTLY
Qty: 20 G | Refills: 3 | Status: SHIPPED | OUTPATIENT
Start: 2024-08-21 | End: 2024-08-22 | Stop reason: SDUPTHER

## 2024-08-21 NOTE — TELEPHONE ENCOUNTER
Called pt because we received a message from the surgery dept that pt would like to reschedule. She stated that she is not ready to have the surgery and would just like to post pone for now. Informed pt to call us when ready to schedule. Pt thanked me for calling.

## 2024-08-21 NOTE — TELEPHONE ENCOUNTER
No care due was identified.  [facility] Embedded Care Due Messages. Reference number: [id number].   8/21/2024 10:57:59 AM CDT

## 2024-08-22 DIAGNOSIS — L70.9 ACNE, UNSPECIFIED ACNE TYPE: ICD-10-CM

## 2024-08-23 RX ORDER — TRETINOIN 0.5 MG/G
CREAM TOPICAL NIGHTLY
Qty: 20 G | Refills: 3 | Status: SHIPPED | OUTPATIENT
Start: 2024-08-23

## 2024-08-29 ENCOUNTER — PATIENT MESSAGE (OUTPATIENT)
Dept: RHEUMATOLOGY | Facility: CLINIC | Age: 24
End: 2024-08-29
Payer: COMMERCIAL

## 2024-08-30 ENCOUNTER — TELEPHONE (OUTPATIENT)
Dept: ENDOSCOPY | Facility: HOSPITAL | Age: 24
End: 2024-08-30
Payer: COMMERCIAL

## 2024-08-30 ENCOUNTER — PATIENT MESSAGE (OUTPATIENT)
Dept: ENDOSCOPY | Facility: HOSPITAL | Age: 24
End: 2024-08-30
Payer: COMMERCIAL

## 2024-08-30 NOTE — TELEPHONE ENCOUNTER
Contacted Pt for Endoscopy precall to confirm scheduled procedure(s) Video Capsule Endoscopy (VCE) on 9/9/24. Pt did not answer. Left voicemail for pt to call Endoscopy Scheduling to confirm.

## 2024-09-04 ENCOUNTER — TELEPHONE (OUTPATIENT)
Dept: SURGERY | Facility: CLINIC | Age: 24
End: 2024-09-04
Payer: COMMERCIAL

## 2024-09-05 ENCOUNTER — OFFICE VISIT (OUTPATIENT)
Dept: SURGERY | Facility: CLINIC | Age: 24
End: 2024-09-05
Payer: COMMERCIAL

## 2024-09-05 VITALS
HEART RATE: 66 BPM | BODY MASS INDEX: 24.89 KG/M2 | WEIGHT: 164.25 LBS | SYSTOLIC BLOOD PRESSURE: 128 MMHG | RESPIRATION RATE: 18 BRPM | DIASTOLIC BLOOD PRESSURE: 83 MMHG | HEIGHT: 68 IN

## 2024-09-05 DIAGNOSIS — K62.89 ANAL PAIN: Primary | ICD-10-CM

## 2024-09-05 DIAGNOSIS — R19.8 PAIN ASSOCIATED WITH DEFECATION: ICD-10-CM

## 2024-09-05 DIAGNOSIS — K92.1 HEMATOCHEZIA: ICD-10-CM

## 2024-09-05 PROCEDURE — 99999 PR PBB SHADOW E&M-EST. PATIENT-LVL V: CPT | Mod: PBBFAC,,, | Performed by: NURSE PRACTITIONER

## 2024-09-05 RX ORDER — HYDROCORTISONE 25 MG/G
CREAM TOPICAL 2 TIMES DAILY
Qty: 28 G | Refills: 2 | Status: SHIPPED | OUTPATIENT
Start: 2024-09-05

## 2024-09-05 NOTE — PATIENT INSTRUCTIONS
Diltiazem cream 3x/day to anus. $45-50 from compounding pharmacy like [facility], [facility].   Anusol cream internally/externally 2x/day for 2 weeks.

## 2024-09-05 NOTE — PROGRESS NOTES
CRS Office Visit History and Physical    Referring Md:   Michelle Valadez, Np  [address] [place] [address]    SUBJECTIVE:     Chief Complaint: anal pain    History of Present Illness:  The patient is new patient to this practice.   Course is as follows:  Patient is a 24 y.o. female with hx of crohn's presents with anal pain with bowel movement.  Symptoms have been present for few weeks. Feels a stabbing pain after bm. Has to sit down for approx 20 mins until it subsides.  Ocurrs with every bm. 1x/day. Recently has become more constipated.  Stools hard with blood and mucous.   Has tried nothing.  Associated bleeding: yes  Previous anorectal procedures: No  Blood thinners: No    Last Colonoscopy completed on 6/25/2024  - The entire examined colon is normal.   - The examined portion of the ileum was normal. Biopsied.   - Multiple biopsies were obtained in the rectum, in the sigmoid colon, in the descending colon, in the transverse colon, in the ascending colon and in the cecum.       Review of patient's allergies indicates:   Allergen Reactions    Azithromycin Hives, Rash and Swelling       Past Medical History:   Diagnosis Date    Allergy     Anxiety 07/31/2021    Arthritis 12/30/2021    PsA    IBD (inflammatory bowel disease)     Joint pain     Lyme disease, unspecified     Psoriasis     Skin disease 12/30/2021    psorias on scalp, behind ears, and inside ear canal    Subclinical hypothyroidism 07/05/2023     Past Surgical History:   Procedure Laterality Date    COLONOSCOPY N/A 08/22/2023    Procedure: COLONOSCOPY;  Surgeon: Stanton Ferrell MD;  Location: [facility];  Service: Endoscopy;  Laterality: N/A;    COLONOSCOPY N/A 6/25/2024    Procedure: COLONOSCOPY;  Surgeon: Jose Yoon MD;  Location: [address]);  Service: Endoscopy;  Laterality: N/A;    ESOPHAGOGASTRODUODENOSCOPY N/A 6/25/2024    Procedure: EGD (ESOPHAGOGASTRODUODENOSCOPY);  Surgeon: Jose Yoon MD;  Location:  [facility] ([facility] F[facility]);  Service: Endoscopy;  Laterality: N/A;  urgent case/ per np a labat / ok to add per alfonso/ prep instructions given in clinic / peg prep  6/24-precall complete, pt notified of earlier arrival time (12:20pm)-[facility]    UPPER GASTROINTESTINAL ENDOSCOPY  05/2023    in [place], minor swelling to lower esophagus per patient report     Family History   Problem Relation Name Age of Onset    Alcohol abuse Mother Olga Renae     Asthma Mother Olga Renae     Rheum arthritis Mother Olga Renae     Psoriasis Mother Olga Reane     Alcohol abuse Father Adrián Renae Jr     Migraines Father Adrián Renae Jr     Acne Father Adrián Renae Jr     Depression Brother Omari Renae     Suicidality Brother Omari Renae     Eczema Brother Omari Renae     Crohn's disease Paternal Aunt Jennifer Cranor     Breast cancer Paternal Aunt Jennifer Cranor     Lupus Paternal Aunt Jennifer Cranor     Migraines Paternal Aunt Jennifer Cranor     Diabetes Maternal Grandmother      Heart failure Maternal Grandfather      Breast cancer Paternal Grandmother      Heart attack Paternal Grandmother      Colon cancer Neg Hx      Esophageal cancer Neg Hx      Stomach cancer Neg Hx       Social History     Tobacco Use    Smoking status: Never     Passive exposure: Never    Smokeless tobacco: Never   Substance Use Topics    Alcohol use: Not Currently     Comment: only socially and a very small amount if ever (one or two)    Drug use: Never        Review of Systems:  ROS    OBJECTIVE:     Vital Signs (Most Recent)  Blood Pressure 128/83 (BP Location: Right arm, Patient Position: Sitting, BP Method: Large (Automatic))   Pulse 66   Respiration 18   Height 5' 8" (1.727 m)   Weight 74.5 kg (164 lb 3.9 oz)   Last Menstrual Period 07/18/2024   Body Mass Index 24.97 kg/m²     Physical Exam:  General: White female in no distress   Neuro: Alert and oriented to person, place, and time.  Moves all extremities.     HEENT: No icterus.  Trachea midline  Respiratory: " Respirations are even and unlabored, no cough or audible wheezing  Skin: Warm dry and intact, No visible rashes, no jaundice    Labs reviewed today:  Lab Results   Component Value Date    WBC 9.21 08/06/2024    HGB 13.3 08/06/2024    HCT 40.2 08/06/2024     08/06/2024    CHOL 182 04/24/2023    TRIG 77 04/24/2023    HDL 69 04/24/2023    ALT 13 06/04/2024    ALT 13 06/04/2024    AST 15 06/04/2024    AST 15 06/04/2024     06/04/2024    K 4.4 06/04/2024     06/04/2024    CREATININE 1.0 06/04/2024    BUN 20 06/04/2024    CO2 27 06/04/2024    TSH 2.444 12/13/2023    HGBA1C 5.1 04/24/2023       Imaging reviewed today:  8/7/24 MRI enterography  No imaging signs of active bowel inflammation, stricture, or penetrating disease.     Endoscopy reviewed today:  Last Colonoscopy completed on 6/25/2024  - The entire examined colon is normal.   - The examined portion of the ileum was normal. Biopsied.   - Multiple biopsies were obtained in the rectum, in the sigmoid colon, in the descending colon, in the transverse colon, in the ascending colon and in the cecum.     Anorectal Exam:    Anal Skin: Normal    Digital Rectal Exam:  Resting Tone normal  Mass none  Tenderness  absent    Anoscopy:  Verbal consent was obtained.   Clear plastic anoscope inserted.    Hemorrhoids  present  Stigmata of bleeding  absent  Stigmata of prolapsed  absent  Distal rectal mucosa normal      ASSESSMENT/PLAN:     Diagnoses and all orders for this visit:    Anal pain  -     diltiazem HCl (DILTIAZEM 2% CREAM); Apply topically 3 (three) times daily. Apply topically to anal area.  -     hydrocortisone (ANUSOL-HC) 2.5 % rectal cream; Place rectally 2 (two) times daily.    Hematochezia  -     Ambulatory referral/consult to Colorectal Surgery  -     hydrocortisone (ANUSOL-HC) 2.5 % rectal cream; Place rectally 2 (two) times daily.    Pain associated with defecation  -     Ambulatory referral/consult to Colorectal Surgery  -     hydrocortisone  (ANUSOL-HC) 2.5 % rectal cream; Place rectally 2 (two) times daily.        The patient was instructed to:  Diltiazem cream tid  Anusol bid  Has VCE scheduled for next week to assess small bowel involvement   F/u with MD if no improvement.         Jami Banuelos, JOSEFINAP-C  Colon and Rectal Surgery

## 2024-09-06 ENCOUNTER — TELEPHONE (OUTPATIENT)
Dept: ENDOSCOPY | Facility: HOSPITAL | Age: 24
End: 2024-09-06
Payer: COMMERCIAL

## 2024-09-09 ENCOUNTER — HOSPITAL ENCOUNTER (OUTPATIENT)
Facility: HOSPITAL | Age: 24
Discharge: HOME | End: 2024-09-09
Attending: RADIOLOGY | Admitting: RADIOLOGY
Payer: COMMERCIAL

## 2024-09-09 VITALS
DIASTOLIC BLOOD PRESSURE: 83 MMHG | RESPIRATION RATE: 14 BRPM | SYSTOLIC BLOOD PRESSURE: 129 MMHG | TEMPERATURE: 99 F | OXYGEN SATURATION: 99 % | HEART RATE: 65 BPM

## 2024-09-09 DIAGNOSIS — K92.1 HEMATOCHEZIA: ICD-10-CM

## 2024-09-09 PROCEDURE — 27201489 HC PILLCAM CAPSULE

## 2024-09-09 PROCEDURE — 91110 GI TRC IMG INTRAL ESOPH-ILE: CPT | Mod: TC | Performed by: INTERNAL MEDICINE

## 2024-09-09 NOTE — PLAN OF CARE
Video capsule ingested at 0705, instructions reviewed with patient, patient verbalized understanding.

## 2024-09-18 PROCEDURE — 91110 GI TRC IMG INTRAL ESOPH-ILE: CPT | Mod: 26,,, | Performed by: INTERNAL MEDICINE

## 2024-09-18 NOTE — [employer] PATIENT INSTRUCTIONS
Discharge Summary/Instructions for after Video Capsule Endoscopy  Patient Name: Kiya Renae  Patient MRN: [id number]  Patient YOB: 2000 Monday, September 9, 2024  Logan Gracia MD  This is a 24 year old female.  Refer to note in patient chart for   documentation of history and physical.  1.  Do Not eat or drink anything for 1 hour.  Try sips of water first.  If   tolerated, resume your regular diet or one recommended by your physician.  2.  Do not drive, operate machinery, make critical decisions, or do   activities that require coordination or balance for 24 hours.  3.   You may experience a sore throat for 24 to 48 hours.  You may use   throat lozenges or gargle with warm salt water to relieve the discomfort.  4.  Because air was put into your stomach during the procedure, you may   experience some belching.  5.  Do not use any medication containing aspirin for 10 days.  6.  Go directly to the emergency room if you notice any of the following:   Chills and/or fever over 101 F   Persistent vomiting or vomiting with blood/nasal regurgitation   Severe abdominal pain, other than gas cramps   Severe chest pain   Black, tarry stools     Your doctor recommends these additional instructions:  You are being discharged to home.   Resume your previous diet today.   Continue your present medications.   Return to your referring physician as previously scheduled.  If you have any questions or problems, please call your physician.  EMERGENCY PHONE NUMBER: [phone]  LAB RESULTS: [phone]  Logan Gracia MD  9/18/2024 11:29:20 AM  This report has been verified and signed electronically.  Dear patient,  As a result of recent federal legislation (The Federal Cures Act), you may   receive lab or pathology results from your procedure in your [facility]   account before your physician is able to contact you. Your physician or   their representative will relay the results to you with their    recommendations at their soonest availability.  Thank you,  [employer]

## 2024-11-05 ENCOUNTER — OFFICE VISIT (OUTPATIENT)
Dept: INTERNAL MEDICINE | Facility: CLINIC | Age: 24
End: 2024-11-05

## 2024-11-05 VITALS
WEIGHT: 166.44 LBS | SYSTOLIC BLOOD PRESSURE: 122 MMHG | HEART RATE: 70 BPM | HEIGHT: 68 IN | BODY MASS INDEX: 25.23 KG/M2 | DIASTOLIC BLOOD PRESSURE: 72 MMHG | OXYGEN SATURATION: 99 %

## 2024-11-05 DIAGNOSIS — B07.9 VIRAL WART ON FINGER: Primary | ICD-10-CM

## 2024-11-05 PROCEDURE — 99999 PR PBB SHADOW E&M-EST. PATIENT-LVL V: CPT | Mod: PBBFAC,,, | Performed by: INTERNAL MEDICINE

## 2024-11-05 NOTE — PROGRESS NOTES
Subjective:       Patient ID: Kiya Renae is a 24 y.o. female.    Chief Complaint: Plantar Warts      Kiya Renae is 24 y.o. female who presents for wart on palm of left ring finger X 6 months.  Pt has tried several OTC treatments without any improvement in symptoms.  Wart has increase in size over last 6 months.            Review of Systems   Constitutional:  Negative for chills and fever.   Respiratory:  Negative for shortness of breath.    Cardiovascular:  Negative for chest pain.   Gastrointestinal:  Negative for abdominal pain, constipation, diarrhea, nausea and vomiting.   Neurological:  Negative for weakness and numbness.         Objective:      Physical Exam  Vitals reviewed.   Constitutional:       General: She is awake.      Appearance: Normal appearance.   HENT:      Head: Normocephalic and atraumatic.      Mouth/Throat:      Mouth: Mucous membranes are moist.      Pharynx: Oropharynx is clear.   Eyes:      Extraocular Movements: Extraocular movements intact.      Conjunctiva/sclera: Conjunctivae normal.      Pupils: Pupils are equal, round, and reactive to light.   Cardiovascular:      Rate and Rhythm: Normal rate and regular rhythm.      Heart sounds: No murmur heard.     No friction rub. No gallop.   Pulmonary:      Effort: Pulmonary effort is normal.      Breath sounds: Normal breath sounds.   Abdominal:      General: Bowel sounds are normal. There is no distension.      Tenderness: There is no abdominal tenderness. There is no guarding or rebound.   Musculoskeletal:      Right lower leg: No edema.      Left lower leg: No edema.   Lymphadenopathy:      Cervical: No cervical adenopathy.   Skin:     Findings: Lesion (4 mm wart on distal, cuellar surface of left ring finger) present.   Neurological:      Mental Status: She is alert and oriented to person, place, and time.   Psychiatric:         Mood and Affect: Mood normal.         Behavior: Behavior is cooperative.         Assessment:        1. Viral wart on finger        Plan:     Pt with wart on left ring finger that has failed tx with OTC medication.  Will refer to dermatology for further tx options.      Kiya was seen today for plantar warts.    Diagnoses and all orders for this visit:    Viral wart on finger  -     Ambulatory referral/consult to Dermatology; Future

## 2024-11-20 ENCOUNTER — OFFICE VISIT (OUTPATIENT)
Dept: DERMATOLOGY | Facility: CLINIC | Age: 24
End: 2024-11-20
Payer: COMMERCIAL

## 2024-11-20 DIAGNOSIS — B07.9 VIRAL WART ON FINGER: ICD-10-CM

## 2024-11-20 PROCEDURE — 99999 PR PBB SHADOW E&M-EST. PATIENT-LVL IV: CPT | Mod: PBBFAC,,, | Performed by: DERMATOLOGY

## 2024-11-20 NOTE — PROGRESS NOTES
Subjective:      Patient ID:  Kiya Renae is a 24 y.o. female who presents for   Chief Complaint   Patient presents with    Warts     L 4th finger     Patient with new area of concern: Wart  Location: L 4th finger. 5 months.  Previous treatments: Otc wart removal    Has had warts previously on foot  Required removal  OTC treatment has not cleared    Review of Systems    Objective:   Physical Exam   Skin:   Areas Examined (abnormalities noted in diagram):   LUE Inspection Performed  Nails and Digits Inspection Performed              Diagram Legend     Erythematous scaling macule/papule c/w actinic keratosis       Vascular papule c/w angioma      Pigmented verrucoid papule/plaque c/w seborrheic keratosis      Yellow umbilicated papule c/w sebaceous hyperplasia      Irregularly shaped tan macule c/w lentigo     1-2 mm smooth white papules consistent with Milia      Movable subcutaneous cyst with punctum c/w epidermal inclusion cyst      Subcutaneous movable cyst c/w pilar cyst      Firm pink to brown papule c/w dermatofibroma      Pedunculated fleshy papule(s) c/w skin tag(s)      Evenly pigmented macule c/w junctional nevus     Mildly variegated pigmented, slightly irregular-bordered macule c/w mildly atypical nevus      Flesh colored to evenly pigmented papule c/w intradermal nevus       Pink pearly papule/plaque c/w basal cell carcinoma      Erythematous hyperkeratotic cursted plaque c/w SCC      Surgical scar with no sign of skin cancer recurrence      Open and closed comedones      Inflammatory papules and pustules      Verrucoid papule consistent consistent with wart     Erythematous eczematous patches and plaques     Dystrophic onycholytic nail with subungual debris c/w onychomycosis     Umbilicated papule    Erythematous-base heme-crusted tan verrucoid plaque consistent with inflamed seborrheic keratosis     Erythematous Silvery Scaling Plaque c/w Psoriasis     See annotation      Assessment / Plan:         Viral wart on finger  -     Ambulatory referral/consult to Dermatology    The diagnosis options, risks, benefits and alternatives were discussed with the patient.    Sufficient time was available for questions, and all questions were answered to the her satisfaction.    Will proceed with LN2 tx today     Encouraged tape occlusion at least overnight qd      PROCEDURE NOTE - DESTRUCTION OF LESION(S) BY LIQUID NITROGEN  Diagnosis: wart  Indication: symptomatic lesion(s)  Discussed diagnosis, options, risks benefits and alternatives; verbal consent obtained.  Spray application of liquid nitrogen was carried out to lesion(s) for destruction  Site(s)/number(s) treated: left ring finger  Total number of lesions treated:  one  Anticipated course and routine care instructions reviewed  Followup to general dermatology 4-6 weeks, call prn

## 2024-12-17 DIAGNOSIS — R25.1 TREMOR: ICD-10-CM

## 2024-12-17 DIAGNOSIS — L70.9 ACNE, UNSPECIFIED ACNE TYPE: ICD-10-CM

## 2024-12-17 RX ORDER — TRETINOIN 0.5 MG/G
CREAM TOPICAL NIGHTLY
Qty: 20 G | Refills: 6 | Status: SHIPPED | OUTPATIENT
Start: 2024-12-17

## 2024-12-17 RX ORDER — PROPRANOLOL HYDROCHLORIDE 80 MG/1
80 CAPSULE, EXTENDED RELEASE ORAL DAILY
Qty: 90 CAPSULE | Refills: 3 | Status: SHIPPED | OUTPATIENT
Start: 2024-12-17

## 2024-12-17 NOTE — TELEPHONE ENCOUNTER
No care due was identified.  [facility] Embedded Care Due Messages. Reference number: [id number].   12/17/2024 9:13:12 AM CST
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
## 2025-01-08 ENCOUNTER — TELEPHONE (OUTPATIENT)
Dept: ORTHOPEDICS | Facility: CLINIC | Age: 25
End: 2025-01-08
Payer: COMMERCIAL

## 2025-01-08 DIAGNOSIS — M50.30 DDD (DEGENERATIVE DISC DISEASE), CERVICAL: Primary | ICD-10-CM

## 2025-01-08 NOTE — TELEPHONE ENCOUNTER
Spoke to patient regarding x-ray. Patient is aware of x-ray scheduled for 6:30 am at the [facility] on 01/09/2025. Patient stated thank you. Thanks.

## 2025-01-09 ENCOUNTER — OFFICE VISIT (OUTPATIENT)
Dept: ORTHOPEDICS | Facility: CLINIC | Age: 25
End: 2025-01-09
Payer: COMMERCIAL

## 2025-01-09 ENCOUNTER — HOSPITAL ENCOUNTER (OUTPATIENT)
Dept: RADIOLOGY | Facility: HOSPITAL | Age: 25
Discharge: HOME | End: 2025-01-09
Attending: REGISTERED NURSE
Payer: COMMERCIAL

## 2025-01-09 VITALS — BODY MASS INDEX: 24.26 KG/M2 | WEIGHT: 160.06 LBS | HEIGHT: 68 IN

## 2025-01-09 DIAGNOSIS — M54.2 NECK PAIN: Primary | ICD-10-CM

## 2025-01-09 DIAGNOSIS — M50.30 DDD (DEGENERATIVE DISC DISEASE), CERVICAL: ICD-10-CM

## 2025-01-09 PROCEDURE — 72050 X-RAY EXAM NECK SPINE 4/5VWS: CPT | Mod: 26,,, | Performed by: RADIOLOGY

## 2025-01-09 PROCEDURE — 99999 PR PBB SHADOW E&M-EST. PATIENT-LVL IV: CPT | Mod: PBBFAC,,, | Performed by: REGISTERED NURSE

## 2025-01-09 PROCEDURE — 72050 X-RAY EXAM NECK SPINE 4/5VWS: CPT | Mod: TC

## 2025-01-09 RX ORDER — IBUPROFEN 800 MG/1
800 TABLET ORAL EVERY 6 HOURS PRN
Qty: 60 TABLET | Refills: 0 | Status: SHIPPED | OUTPATIENT
Start: 2025-01-09

## 2025-01-09 RX ORDER — OSELTAMIVIR PHOSPHATE 75 MG/1
75 CAPSULE ORAL 2 TIMES DAILY
COMMUNITY
Start: 2024-12-24

## 2025-01-09 RX ORDER — SERTRALINE HYDROCHLORIDE 100 MG/1
100 TABLET, FILM COATED ORAL
COMMUNITY
Start: 2024-12-17

## 2025-01-09 RX ORDER — METHOCARBAMOL 500 MG/1
500-1000 TABLET, FILM COATED ORAL 3 TIMES DAILY
Qty: 60 TABLET | Refills: 0 | Status: SHIPPED | OUTPATIENT
Start: 2025-01-09 | End: 2025-01-19

## 2025-01-09 RX ORDER — DEXAMETHASONE SODIUM PHOSPHATE 4 MG/ML
8 INJECTION, SOLUTION INTRA-ARTICULAR; INTRALESIONAL; INTRAMUSCULAR; INTRAVENOUS; SOFT TISSUE ONCE
Status: COMPLETED | OUTPATIENT
Start: 2025-01-09 | End: 2025-01-09

## 2025-01-09 RX ADMIN — DEXAMETHASONE SODIUM PHOSPHATE 8 MG: 4 INJECTION, SOLUTION INTRA-ARTICULAR; INTRALESIONAL; INTRAMUSCULAR; INTRAVENOUS; SOFT TISSUE at 07:01

## 2025-01-13 ENCOUNTER — OFFICE VISIT (OUTPATIENT)
Dept: DERMATOLOGY | Facility: CLINIC | Age: 25
End: 2025-01-13
Payer: COMMERCIAL

## 2025-01-13 DIAGNOSIS — B07.8 PALMAR WART: Primary | ICD-10-CM

## 2025-01-13 DIAGNOSIS — B07.0 PLANTAR WART: ICD-10-CM

## 2025-01-13 PROCEDURE — 1159F MED LIST DOCD IN RCRD: CPT | Mod: CPTII,S$GLB,, | Performed by: STUDENT IN AN ORGANIZED HEALTH CARE EDUCATION/TRAINING PROGRAM

## 2025-01-13 PROCEDURE — 99213 OFFICE O/P EST LOW 20 MIN: CPT | Mod: 25,S$GLB,, | Performed by: STUDENT IN AN ORGANIZED HEALTH CARE EDUCATION/TRAINING PROGRAM

## 2025-01-13 PROCEDURE — 17110 DESTRUCTION B9 LES UP TO 14: CPT | Mod: S$GLB,,, | Performed by: STUDENT IN AN ORGANIZED HEALTH CARE EDUCATION/TRAINING PROGRAM

## 2025-01-13 PROCEDURE — 99999 PR PBB SHADOW E&M-EST. PATIENT-LVL IV: CPT | Mod: PBBFAC,,, | Performed by: STUDENT IN AN ORGANIZED HEALTH CARE EDUCATION/TRAINING PROGRAM

## 2025-01-13 PROCEDURE — 1160F RVW MEDS BY RX/DR IN RCRD: CPT | Mod: CPTII,S$GLB,, | Performed by: STUDENT IN AN ORGANIZED HEALTH CARE EDUCATION/TRAINING PROGRAM

## 2025-01-13 NOTE — PROGRESS NOTES
Subjective:      Patient ID:  Kiya Renae is a 24 y.o. female who presents for   Chief Complaint   Patient presents with    Warts     Patient with new area of concern: Wart  Location: L 4th finger  Previous treatments:freezing ,     This is pt 3rd round of freezing       Has had warts previously on foot  Required removal  OTC treatment has not cleared      Previously seen for acne and psoriasis. Here for unrelated issue    Review of Systems    Objective:   Physical Exam   Constitutional: She appears well-developed and well-nourished. No distress.   Neurological: She is alert and oriented to person, place, and time. She is not disoriented.   Psychiatric: She has a normal mood and affect.   Skin:   Areas Examined (abnormalities noted in diagram):   LUE Inspection Performed  Nails and Digits Inspection Performed                    Diagram Legend     Erythematous scaling macule/papule c/w actinic keratosis       Vascular papule c/w angioma      Pigmented verrucoid papule/plaque c/w seborrheic keratosis      Yellow umbilicated papule c/w sebaceous hyperplasia      Irregularly shaped tan macule c/w lentigo     1-2 mm smooth white papules consistent with Milia      Movable subcutaneous cyst with punctum c/w epidermal inclusion cyst      Subcutaneous movable cyst c/w pilar cyst      Firm pink to brown papule c/w dermatofibroma      Pedunculated fleshy papule(s) c/w skin tag(s)      Evenly pigmented macule c/w junctional nevus     Mildly variegated pigmented, slightly irregular-bordered macule c/w mildly atypical nevus      Flesh colored to evenly pigmented papule c/w intradermal nevus       Pink pearly papule/plaque c/w basal cell carcinoma      Erythematous hyperkeratotic cursted plaque c/w SCC      Surgical scar with no sign of skin cancer recurrence      Open and closed comedones      Inflammatory papules and pustules      Verrucoid papule consistent consistent with wart     Erythematous eczematous patches and  plaques     Dystrophic onycholytic nail with subungual debris c/w onychomycosis     Umbilicated papule    Erythematous-base heme-crusted tan verrucoid plaque consistent with inflamed seborrheic keratosis     Erythematous Silvery Scaling Plaque c/w Psoriasis     See annotation            Assessment / Plan:        Palmar wart    Plantar wart    Pared, treated with LN2, IL candida to 2 on bottom of foot      - Counseled that lesions are caused by a virus and can therefore be spread to other people and other parts of patients body. Explained that they typically resolve over 1-2 years but treatment can hasten resolution. Treatment options include topicals (imiquimod, fluorouracil, salicylic acid, retinoids, podophyllin, cidofovir), destruction (cryotherapy, ED&C, cantharidin, laser), immunologic injections (candida, flu vaccine)   - compounded 5FU + 70% sal acid covered with duct tape or bandage nightly   -  Soak in warm water for 5 minutes then pare down with sandpaper (use each piece only once)     Procedure note for Candida Antigen injection:    Discussed risks of procedure including local redness, swelling, and lymph node enlargement. Verbal consent obtained. Area cleaned with alcohol. 0.3cc of Candida antigen injected intradermally at the base of the verruca. Patient tolerated well.   This was patient's 1 cycle of Candida Antigen.   NDC for Candida Antigen: 48703-163-77    Cryosurgery procedure note:  Verbal consent from the patient is obtained including, but not limited to, risk of hypopigmentation/hyperpigmentation, scar, recurrence of lesion. Liquid nitrogen cryosurgery is applied to 4 verruca with prior paring, as detailed in the physical exam, to produce a freeze injury. 3 consecutive freeze thaw cycles are applied to each verruca. The patient is aware that blisters (possibly blood blisters) may form.    3 cycles, 15 - 20 seconds each         Follow up in about 3 weeks (around 2/3/2025).

## 2025-01-13 NOTE — PATIENT INSTRUCTIONS
Warts        Nighttime regimen:   Soak finger for 5 minutes in lukewarm water    Either:  Apply salicylic acid 40% solution to affected area (Wart Stick or other prescribed medication). If on the hands or feet, cover with duct tape (3M)   Or:  Curad Mediplast 40% salicylic acid pads    Bandaids are not good because air still gets in   Covering warts helps soften them which helps the medication work better     Daytime regimen:   Remove duct tape   Pare down with a piece of sandpaper that can be thrown away.   DO NOT use stone/file/same paper twice or on any other area of body because this can spread the infection     Expectations:   Warts take a long time to treat    Some of the side effects of in office treatment, such as freezing, include redness and blistering; this is normal   Virus can live in the skin for long periods of time, even if no visible bump remains    Remember warts are contagious    MUST BE CONSISTENT WITH TREATMENT FOR BEST RESULTS       CRYOSURGERY      Your doctor has used a method called cryosurgery to treat your skin condition. Cryosurgery refers to the use of very cold substances to treat a variety of skin conditions such as warts, pre-skin cancers, molluscum contagiosum, sun spots, and several benign growths. The substance we use in cryosurgery is liquid nitrogen and is so cold (-195 degrees Celsius) that is burns when administered.     Following treatment in the office, the skin may immediately burn and become red. You may find the area around the lesion is affected as well. It is sometimes necessary to treat not only the lesion, but a small area of the surrounding normal skin to achieve a good response.     A blister, and even a blood filled blister, may form after treatment.   This is a normal response. If the blister is painful, it is acceptable to sterilize a needle and with rubbing alcohol and gently pop the blister. It is important that you gently wash the area with soap and warm water  as the blister fluid may contain wart virus if a wart was treated. Do no remove the roof of the blister.     The area treated can take anywhere from 1-3 weeks to heal. Healing time depends on the kind skin lesion treated, the location, and how aggressively the lesion was treated. It is recommended that the areas treated are covered with Vaseline or bacitracin ointment and a band-aid. If a band-aid is not practical, just ointment applied several times per day will do. Keeping these areas moist will speed the healing time.    Treatment with liquid nitrogen can leave a scar. In dark skin, it may be a light or dark scar, in light skin it may be a white or pink scar. These will generally fade with time, but may never go away completely.     If you have any concerns after your treatment, please feel free to call the office.       [facility] [place] [address]/ [phone] FAX/ [email or web address]

## 2025-01-15 ENCOUNTER — LAB VISIT (OUTPATIENT)
Dept: LAB | Facility: HOSPITAL | Age: 25
End: 2025-01-15
Attending: STUDENT IN AN ORGANIZED HEALTH CARE EDUCATION/TRAINING PROGRAM
Payer: COMMERCIAL

## 2025-01-15 ENCOUNTER — OFFICE VISIT (OUTPATIENT)
Dept: PRIMARY CARE CLINIC | Facility: CLINIC | Age: 25
End: 2025-01-15
Payer: COMMERCIAL

## 2025-01-15 VITALS
HEIGHT: 68 IN | SYSTOLIC BLOOD PRESSURE: 106 MMHG | HEART RATE: 99 BPM | DIASTOLIC BLOOD PRESSURE: 68 MMHG | BODY MASS INDEX: 24.43 KG/M2 | OXYGEN SATURATION: 100 % | WEIGHT: 161.19 LBS

## 2025-01-15 DIAGNOSIS — E03.8 SUBCLINICAL HYPOTHYROIDISM: ICD-10-CM

## 2025-01-15 DIAGNOSIS — K50.911 CROHN'S DISEASE WITH RECTAL BLEEDING, UNSPECIFIED GASTROINTESTINAL TRACT LOCATION: ICD-10-CM

## 2025-01-15 DIAGNOSIS — Z00.00 ANNUAL PHYSICAL EXAM: Primary | ICD-10-CM

## 2025-01-15 DIAGNOSIS — R29.90 NEUROLOGICAL DYSFUNCTION: ICD-10-CM

## 2025-01-15 DIAGNOSIS — G43.009 MIGRAINE WITHOUT AURA AND WITHOUT STATUS MIGRAINOSUS, NOT INTRACTABLE: ICD-10-CM

## 2025-01-15 DIAGNOSIS — Z86.19 H/O LYME DISEASE: ICD-10-CM

## 2025-01-15 DIAGNOSIS — Z00.00 ANNUAL PHYSICAL EXAM: ICD-10-CM

## 2025-01-15 DIAGNOSIS — L40.50 PSORIATIC ARTHRITIS: ICD-10-CM

## 2025-01-15 DIAGNOSIS — R25.1 TREMORS OF NERVOUS SYSTEM: ICD-10-CM

## 2025-01-15 DIAGNOSIS — F41.9 ANXIETY: ICD-10-CM

## 2025-01-15 DIAGNOSIS — L40.0 PLAQUE PSORIASIS: ICD-10-CM

## 2025-01-15 DIAGNOSIS — D84.9 IMMUNOCOMPROMISED STATE: ICD-10-CM

## 2025-01-15 LAB
ALBUMIN SERPL BCP-MCNC: 3.8 G/DL (ref 3.5–5.2)
ALP SERPL-CCNC: 41 U/L (ref 40–150)
ALT SERPL W/O P-5'-P-CCNC: 40 U/L (ref 10–44)
ANION GAP SERPL CALC-SCNC: 7 MMOL/L (ref 8–16)
AST SERPL-CCNC: 31 U/L (ref 10–40)
BASOPHILS # BLD AUTO: 0.04 K/UL (ref 0–0.2)
BASOPHILS NFR BLD: 0.5 % (ref 0–1.9)
BILIRUB SERPL-MCNC: 0.3 MG/DL (ref 0.1–1)
BUN SERPL-MCNC: 18 MG/DL (ref 6–20)
CALCIUM SERPL-MCNC: 9.1 MG/DL (ref 8.7–10.5)
CHLORIDE SERPL-SCNC: 107 MMOL/L (ref 95–110)
CHOLEST SERPL-MCNC: 129 MG/DL (ref 120–199)
CHOLEST/HDLC SERPL: 3.6 {RATIO} (ref 2–5)
CO2 SERPL-SCNC: 25 MMOL/L (ref 23–29)
CREAT SERPL-MCNC: 0.9 MG/DL (ref 0.5–1.4)
DIFFERENTIAL METHOD BLD: ABNORMAL
EOSINOPHIL # BLD AUTO: 0.2 K/UL (ref 0–0.5)
EOSINOPHIL NFR BLD: 2.5 % (ref 0–8)
ERYTHROCYTE [DISTWIDTH] IN BLOOD BY AUTOMATED COUNT: 11.9 % (ref 11.5–14.5)
EST. GFR  (NO RACE VARIABLE): >60 ML/MIN/1.73 M^2
ESTIMATED AVG GLUCOSE: 100 MG/DL (ref 68–131)
GLUCOSE SERPL-MCNC: 65 MG/DL (ref 70–110)
HBA1C MFR BLD: 5.1 % (ref 4–5.6)
HCT VFR BLD AUTO: 39.4 % (ref 37–48.5)
HDLC SERPL-MCNC: 36 MG/DL (ref 40–75)
HDLC SERPL: 27.9 % (ref 20–50)
HGB BLD-MCNC: 13.1 G/DL (ref 12–16)
IMM GRANULOCYTES # BLD AUTO: 0.09 K/UL (ref 0–0.04)
IMM GRANULOCYTES NFR BLD AUTO: 1.2 % (ref 0–0.5)
LDLC SERPL CALC-MCNC: 79 MG/DL (ref 63–159)
LYMPHOCYTES # BLD AUTO: 1.8 K/UL (ref 1–4.8)
LYMPHOCYTES NFR BLD: 23.5 % (ref 18–48)
MCH RBC QN AUTO: 30.5 PG (ref 27–31)
MCHC RBC AUTO-ENTMCNC: 33.2 G/DL (ref 32–36)
MCV RBC AUTO: 92 FL (ref 82–98)
MONOCYTES # BLD AUTO: 0.7 K/UL (ref 0.3–1)
MONOCYTES NFR BLD: 9.3 % (ref 4–15)
NEUTROPHILS # BLD AUTO: 4.8 K/UL (ref 1.8–7.7)
NEUTROPHILS NFR BLD: 63 % (ref 38–73)
NONHDLC SERPL-MCNC: 93 MG/DL
NRBC BLD-RTO: 0 /100 WBC
PLATELET # BLD AUTO: 300 K/UL (ref 150–450)
PMV BLD AUTO: 12.6 FL (ref 9.2–12.9)
POTASSIUM SERPL-SCNC: 4 MMOL/L (ref 3.5–5.1)
PROT SERPL-MCNC: 6.6 G/DL (ref 6–8.4)
RBC # BLD AUTO: 4.3 M/UL (ref 4–5.4)
SODIUM SERPL-SCNC: 139 MMOL/L (ref 136–145)
TRIGL SERPL-MCNC: 70 MG/DL (ref 30–150)
TSH SERPL DL<=0.005 MIU/L-ACNC: 1.94 UIU/ML (ref 0.4–4)
WBC # BLD AUTO: 7.61 K/UL (ref 3.9–12.7)

## 2025-01-15 PROCEDURE — 85025 COMPLETE CBC W/AUTO DIFF WBC: CPT | Performed by: STUDENT IN AN ORGANIZED HEALTH CARE EDUCATION/TRAINING PROGRAM

## 2025-01-15 PROCEDURE — 3078F DIAST BP <80 MM HG: CPT | Mod: CPTII,S$GLB,, | Performed by: STUDENT IN AN ORGANIZED HEALTH CARE EDUCATION/TRAINING PROGRAM

## 2025-01-15 PROCEDURE — 3074F SYST BP LT 130 MM HG: CPT | Mod: CPTII,S$GLB,, | Performed by: STUDENT IN AN ORGANIZED HEALTH CARE EDUCATION/TRAINING PROGRAM

## 2025-01-15 PROCEDURE — 99999 PR PBB SHADOW E&M-EST. PATIENT-LVL IV: CPT | Mod: PBBFAC,,, | Performed by: STUDENT IN AN ORGANIZED HEALTH CARE EDUCATION/TRAINING PROGRAM

## 2025-01-15 PROCEDURE — 99395 PREV VISIT EST AGE 18-39: CPT | Mod: S$GLB,,, | Performed by: STUDENT IN AN ORGANIZED HEALTH CARE EDUCATION/TRAINING PROGRAM

## 2025-01-15 PROCEDURE — 1160F RVW MEDS BY RX/DR IN RCRD: CPT | Mod: CPTII,S$GLB,, | Performed by: STUDENT IN AN ORGANIZED HEALTH CARE EDUCATION/TRAINING PROGRAM

## 2025-01-15 PROCEDURE — 3008F BODY MASS INDEX DOCD: CPT | Mod: CPTII,S$GLB,, | Performed by: STUDENT IN AN ORGANIZED HEALTH CARE EDUCATION/TRAINING PROGRAM

## 2025-01-15 PROCEDURE — 1159F MED LIST DOCD IN RCRD: CPT | Mod: CPTII,S$GLB,, | Performed by: STUDENT IN AN ORGANIZED HEALTH CARE EDUCATION/TRAINING PROGRAM

## 2025-01-15 PROCEDURE — 80053 COMPREHEN METABOLIC PANEL: CPT | Performed by: STUDENT IN AN ORGANIZED HEALTH CARE EDUCATION/TRAINING PROGRAM

## 2025-01-15 PROCEDURE — 80061 LIPID PANEL: CPT | Performed by: STUDENT IN AN ORGANIZED HEALTH CARE EDUCATION/TRAINING PROGRAM

## 2025-01-15 PROCEDURE — 84443 ASSAY THYROID STIM HORMONE: CPT | Performed by: STUDENT IN AN ORGANIZED HEALTH CARE EDUCATION/TRAINING PROGRAM

## 2025-01-15 PROCEDURE — 83036 HEMOGLOBIN GLYCOSYLATED A1C: CPT | Performed by: STUDENT IN AN ORGANIZED HEALTH CARE EDUCATION/TRAINING PROGRAM

## 2025-01-15 NOTE — PROGRESS NOTES
Kiya Renae  2000        Subjective     Chief Complaint: Annual    History of Present Illness:  Ms. Kiya Renae is a 24 y.o. female who presents to clinic for annual. Overall doing well.     Still following with GI in [place] and Dr. Yoon here. Hx of Crohn's. On Skyrizi every 8 weeks.  Had colonoscopy 6/2024. Feeling very well.   Impression:            - The entire examined colon is normal.                          - The examined portion of the ileum was normal.                          Biopsied.                          - Multiple biopsies were obtained in the rectum,                          in the sigmoid colon, in the descending colon, in                          the transverse colon, in the ascending colon and                          in the cecum.     Final Pathologic Diagnosis 1. Duodenum, biopsy:  - Benign duodenal mucosa with mild nonspecific reactive changes  - No evidence of celiac disease  - Negative for dysplasia or carcinoma    2. Stomach, biopsy:  - Antral-type gastric mucosa with features suggestive of reactive gastropathy  - No Helicobacter pylori identified by immunohistochemistry  - Negative for intestinal metaplasia  - Negative for dysplasia or carcinoma    3. Terminal ileum, biopsy:  - Benign small bowel mucosa without significant histopathologic alteration  - Negative for active inflammation  - Negative for architectural disorder  - Negative for granulomas or viral inclusions  - Negative for dysplasia or carcinoma    4. Colon, cecum and ascending, biopsy:  - Benign colonic mucosa without significant histopathologic alteration  - Negative for active or microscopic colitis  - Negative for architectural disorder  - Negative for granulomas or viral inclusions  - Negative for dysplasia or carcinoma    5. Colon, transverse, biopsy:  - Benign colonic mucosa without significant histopathologic alteration  - Negative for active or microscopic colitis  - Negative for architectural  "disorder  - Negative for granulomas or viral inclusions  - Negative for dysplasia or carcinoma    6. Colon, descending and sigmoid, biopsy:  - Benign colonic mucosa without significant histopathologic alteration  - Negative for active or microscopic colitis  - Negative for architectural disorder  - Negative for granulomas or viral inclusions  - Negative for dysplasia or carcinoma    7. Rectum, biopsy:  - Benign rectal mucosa without significant histopathologic alteration  - Negative for active or microscopic colitis  - Negative for architectural disorder  - Negative for granulomas or viral inclusions  - Negative for dysplasia or carcinoma       Has been having lots of recent URIs. Ended up with pneumonia, grandmother was sick too. Ended up on abx for this. Also recently thinks she had norovirus vs food poisoning (nausea/vomiting).   Also had flu A, s/p tamiflu.     S/p MRE-   Impression:  No imaging signs of active bowel inflammation, stricture, or penetrating disease.      Psoriatic Arthritis- follows with Rheum.   Per notes, "Used to follow with rheumatology in [place]. Records reviewed. Last visit in 7/2022. 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 Negative serologies. Methotrexate caused nausea. Failed nabumetone.   Otezla - developed increased depression and GI upset."      Hx of Lyme- S/p 14m of minocycline for a while. Did doxy as well.     Tremors- doing very well on Propranolol 80 mg. Plan for yearly MRIs.  Sees [facility] Morales.  Takes Topiramate prn.  Has f/u 2/25    Anxiety- stable. On Zoloft 200 mg.    Migraines- Doing well, nurtec as needed. Takes Topiramate prn.    BP Readings from Last 5 Encounters:   01/15/25 106/68   11/05/24 122/72   09/09/24 129/83   09/05/24 128/83   08/13/24 121/81     Answers submitted by the patient for this visit:  Review of Systems Questionnaire (Submitted on 1/7/2025)  activity change: No  unexpected weight change: No  rhinorrhea: No  trouble " swallowing: No  visual disturbance: No  chest tightness: No  polyuria: No  difficulty urinating: No  menstrual problem: No  joint swelling: No  arthralgias: No  confusion: No  dysphoric mood: No        Review of Systems   Constitutional:  Negative for chills, fever and malaise/fatigue.   HENT:  Negative for hearing loss.    Eyes:  Negative for discharge.   Respiratory:  Negative for wheezing.    Cardiovascular:  Negative for chest pain and palpitations.   Gastrointestinal:  Negative for blood in stool, constipation, diarrhea and vomiting.   Genitourinary:  Negative for dysuria and hematuria.   Musculoskeletal:  Positive for neck pain.   Neurological:  Negative for weakness and headaches.   Endo/Heme/Allergies:  Negative for polydipsia.   Psychiatric/Behavioral:  The patient is not nervous/anxious.         PAST HISTORY:     Past Medical History:   Diagnosis Date    Allergy     Anxiety 07/31/2021    Arthritis 12/30/2021    PsA    IBD (inflammatory bowel disease)     Joint pain     Lyme disease, unspecified     Psoriasis     Skin disease 12/30/2021    psorias on scalp, behind ears, and inside ear canal    Subclinical hypothyroidism 07/05/2023       Past Surgical History:   Procedure Laterality Date    COLONOSCOPY N/A 08/22/2023    Procedure: COLONOSCOPY;  Surgeon: Stanton Ferrell MD;  Location: [facility];  Service: Endoscopy;  Laterality: N/A;    COLONOSCOPY N/A 6/25/2024    Procedure: COLONOSCOPY;  Surgeon: Jose Yoon MD;  Location: [address]);  Service: Endoscopy;  Laterality: N/A;    ESOPHAGOGASTRODUODENOSCOPY N/A 6/25/2024    Procedure: EGD (ESOPHAGOGASTRODUODENOSCOPY);  Surgeon: Jose Yoon MD;  Location: [address]);  Service: Endoscopy;  Laterality: N/A;  urgent case/ per np a labat / ok to add per alfonso/ prep instructions given in clinic / peg prep  6/24-precall complete, pt notified of earlier arrival time (12:20pm)-[facility]    INTRALUMINAL GASTROINTESTINAL TRACT IMAGING VIA CAPSULE  N/A 9/9/2024    Procedure: IMAGING PROCEDURE, GI TRACT, INTRALUMINAL, VIA CAPSULE;  Surgeon: Familia Jackson, First Available;  Location: [facility] ENDO (4TH FLR);  Service: Endoscopy;  Laterality: N/A;  PERSON/PORTAL-MIRALAX-DW  8/30-lvm for pre call-tb  9/6-lvm for precall-Kpvt    UPPER GASTROINTESTINAL ENDOSCOPY  05/2023    in [place], minor swelling to lower esophagus per patient report       Family History   Problem Relation Name Age of Onset    Alcohol abuse Mother Olga Renae     Asthma Mother Olga Renae     Rheum arthritis Mother Olga Renae     Psoriasis Mother Olga Renae     Alcohol abuse Father Adrián Renae Jr     Migraines Father Adrián Renae Jr     Acne Father Adrián Renae Jr     Depression Brother Omari Renae     Suicidality Brother Omari Renae     Eczema Brother Omari Renae     Crohn's disease Paternal Aunt Jennifer Cranor     Breast cancer Paternal Aunt Jennifer Cranor     Lupus Paternal Aunt Jennifer Cranor     Migraines Paternal Aunt Jennifer Cranor     Diabetes Maternal Grandmother      Heart failure Maternal Grandfather      Breast cancer Paternal Grandmother      Heart attack Paternal Grandmother      Colon cancer Neg Hx      Esophageal cancer Neg Hx      Stomach cancer Neg Hx           MEDICATIONS & ALLERGIES:     Current Outpatient Medications on File Prior to Visit   Medication Sig    azelastine (ASTELIN) 137 mcg (0.1 %) nasal spray 1 spray (137 mcg total) by Nasal route 2 (two) times daily.    clobetasoL (TEMOVATE) 0.05 % external solution Apply topically once daily. Use to affected areas for up to 2 weeks then take a 1 week break or decrease to 3 times weekly. Do not apply to groin or face    dicyclomine (BENTYL) 20 mg tablet Take 1 tablet (20 mg total) by mouth every 8 (eight) hours as needed (abdominal cramping).    ergocalciferol (VITAMIN D2) 50,000 unit Cap Take 1 capsule (50,000 Units total) by mouth every 7 days.    fluocinolone acetonide oiL 0.01 % Drop Place 1 application in ear(s) once daily. Use to  affected areas for up to 2 weeks then take a 1 week break or decrease to 3 times weekly. For use in ears    fluticasone propionate (FLONASE) 50 mcg/actuation nasal spray 2 sprays (100 mcg total) by Each Nostril route once daily.    gabapentin (NEURONTIN) 100 MG capsule Take 100 mg by mouth daily as needed.    hydrocortisone (ANUSOL-HC) 2.5 % rectal cream Place rectally 2 (two) times daily.    ibuprofen (ADVIL,MOTRIN) 800 MG tablet Take 1 tablet (800 mg total) by mouth every 6 (six) hours as needed for Pain.    methocarbamoL (ROBAXIN) 500 MG Tab Take 1-2 tablets (500-1,000 mg total) by mouth 3 (three) times daily. for 10 days    ondansetron (ZOFRAN) 8 MG tablet Take 8 mg by mouth every 8 (eight) hours as needed for Nausea.    pantoprazole (PROTONIX) 40 MG tablet Take 1 tablet (40 mg total) by mouth once daily.    polyethylene glycol (GLYCOLAX) 17 gram/dose powder Take 17 g by mouth once daily. Mix with 8 oz of water/liquid.    propranoloL (INDERAL LA) 80 MG 24 hr capsule Take 1 capsule (80 mg total) by mouth once daily.    rimegepant (NURTEC) 75 mg odt Take 75 mg by mouth once as needed for Migraine. Place ODT tablet on the tongue; alternatively the ODT tablet may be placed under the tongue    sertraline (ZOLOFT) 100 MG tablet Take 100 mg by mouth.    sertraline 200 mg Cap     SKYRIZI 360 mg/2.4 mL (150 mg/mL) Injt     topiramate (TOPAMAX) 25 MG tablet Take 1 tablet (25 mg total) by mouth once daily.    tretinoin (RETIN-A) 0.05 % cream Apply topically every evening. Start twice weekly and increase as tolerated. Pea sized amount to entire face.    [DISCONTINUED] oseltamivir (TAMIFLU) 75 MG capsule Take 75 mg by mouth 2 (two) times daily.     Current Facility-Administered Medications on File Prior to Visit   Medication    triamcinolone acetonide injection 10 mg       Review of patient's allergies indicates:   Allergen Reactions    Azithromycin Hives, Rash and Swelling       OBJECTIVE:     Vital Signs:  Vitals:     "01/15/25 0938   BP: 106/68   BP Location: Right arm   Patient Position: Sitting   Pulse: 99   SpO2: 100%   Weight: 73.1 kg (161 lb 2.5 oz)   Height: 5' 8" (1.727 m)       Body mass index is 24.5 kg/m².     Physical Exam:  Physical Exam  Vitals and nursing note reviewed.   Constitutional:       General: She is not in acute distress.     Appearance: Normal appearance. She is not ill-appearing, toxic-appearing or diaphoretic.   HENT:      Head: Normocephalic and atraumatic.      Right Ear: Tympanic membrane, ear canal and external ear normal.      Left Ear: Tympanic membrane, ear canal and external ear normal.      Mouth/Throat:      Mouth: Mucous membranes are moist.      Pharynx: No oropharyngeal exudate or posterior oropharyngeal erythema.   Eyes:      General: No scleral icterus.        Right eye: No discharge.         Left eye: No discharge.      Conjunctiva/sclera: Conjunctivae normal.   Cardiovascular:      Rate and Rhythm: Normal rate and regular rhythm.      Pulses: Normal pulses.      Heart sounds: No murmur heard.  Pulmonary:      Effort: Pulmonary effort is normal. No respiratory distress.      Breath sounds: Normal breath sounds. No wheezing.   Abdominal:      Tenderness: There is no right CVA tenderness or left CVA tenderness.   Musculoskeletal:         General: Normal range of motion.      Cervical back: Normal range of motion and neck supple. No rigidity.      Right lower leg: No edema.      Left lower leg: No edema.   Lymphadenopathy:      Cervical: No cervical adenopathy.   Skin:     General: Skin is warm and dry.   Neurological:      Mental Status: She is alert and oriented to person, place, and time. Mental status is at baseline.      Gait: Gait normal.   Psychiatric:         Mood and Affect: Mood and affect normal.         Behavior: Behavior normal.            Laboratory  Lab Results   Component Value Date    GLU 77 06/04/2024     06/04/2024    K 4.4 06/04/2024     06/04/2024    CO2 27 " "06/04/2024    BUN 20 06/04/2024    CREATININE 1.0 06/04/2024    CALCIUM 9.7 06/04/2024    MG 2.0 09/05/2023     Lab Results   Component Value Date    HGBA1C 5.1 04/24/2023     No results for input(s): "POCTGLUCOSE" in the last 72 hours.        ASSESSMENT & PLAN:   Ms. Kiya Renae is a 24 y.o. female who was seen today in clinic for annual.     1. Annual physical exam  -     CBC Auto Differential; Future; Expected date: 01/15/2025  -     Comprehensive Metabolic Panel; Future; Expected date: 01/15/2025  -     Hemoglobin A1C; Future; Expected date: 01/15/2025  -     Lipid Panel; Future; Expected date: 01/15/2025  -     TSH; Future; Expected date: 01/15/2025    2. Crohn's disease with rectal bleeding, unspecified gastrointestinal tract location  -     CBC Auto Differential; Future; Expected date: 01/15/2025  -     Comprehensive Metabolic Panel; Future; Expected date: 01/15/2025  -     Hemoglobin A1C; Future; Expected date: 01/15/2025  -     Lipid Panel; Future; Expected date: 01/15/2025  -     TSH; Future; Expected date: 01/15/2025    3. Immunocompromised state  -     CBC Auto Differential; Future; Expected date: 01/15/2025  -     Comprehensive Metabolic Panel; Future; Expected date: 01/15/2025  -     Hemoglobin A1C; Future; Expected date: 01/15/2025  -     Lipid Panel; Future; Expected date: 01/15/2025  -     TSH; Future; Expected date: 01/15/2025    4. Psoriatic arthritis  -     CBC Auto Differential; Future; Expected date: 01/15/2025  -     Comprehensive Metabolic Panel; Future; Expected date: 01/15/2025  -     Hemoglobin A1C; Future; Expected date: 01/15/2025  -     Lipid Panel; Future; Expected date: 01/15/2025  -     TSH; Future; Expected date: 01/15/2025    5. Plaque psoriasis  Overview:  - overall her skin is well controlled on skyrizi, was flaring prior to shot being due. However she was diagnosed with crohns end of 2023. Dose of skyrizi was increased and now psoriasis is well controlled  - topically, " "she is using fluocinolone oil in the ears and clobetasol solution for her scalp, which is helping  - Elidel was not covered    - Well controlled today     Initial history:  - Has a diagnosis of psoriasis since at least 2021 but has a history of "dandruff" since middle school  - Only skin involvement has been scalp and ears  - It improved on skyrizi but still has some dandruff / scalp psoriasis and itching that flares right before next shot is due  - Use tea tree oil which she feels helps  - has psoriatic arthritis and is on skyrizi per rheumatology since 11/2022. Doing well    Orders:  -     CBC Auto Differential; Future; Expected date: 01/15/2025  -     Comprehensive Metabolic Panel; Future; Expected date: 01/15/2025  -     Hemoglobin A1C; Future; Expected date: 01/15/2025  -     Lipid Panel; Future; Expected date: 01/15/2025  -     TSH; Future; Expected date: 01/15/2025    6. H/o Lyme disease  -     CBC Auto Differential; Future; Expected date: 01/15/2025  -     Comprehensive Metabolic Panel; Future; Expected date: 01/15/2025  -     Hemoglobin A1C; Future; Expected date: 01/15/2025  -     Lipid Panel; Future; Expected date: 01/15/2025  -     TSH; Future; Expected date: 01/15/2025    7. Tremors of nervous system  -     CBC Auto Differential; Future; Expected date: 01/15/2025  -     Comprehensive Metabolic Panel; Future; Expected date: 01/15/2025  -     Hemoglobin A1C; Future; Expected date: 01/15/2025  -     Lipid Panel; Future; Expected date: 01/15/2025  -     TSH; Future; Expected date: 01/15/2025    8. Neurological dysfunction  -     CBC Auto Differential; Future; Expected date: 01/15/2025  -     Comprehensive Metabolic Panel; Future; Expected date: 01/15/2025  -     Hemoglobin A1C; Future; Expected date: 01/15/2025  -     Lipid Panel; Future; Expected date: 01/15/2025  -     TSH; Future; Expected date: 01/15/2025    9. Subclinical hypothyroidism  -     CBC Auto Differential; Future; Expected date: 01/15/2025  - "     Comprehensive Metabolic Panel; Future; Expected date: 01/15/2025  -     Hemoglobin A1C; Future; Expected date: 01/15/2025  -     Lipid Panel; Future; Expected date: 01/15/2025  -     TSH; Future; Expected date: 01/15/2025    10. Migraine without aura and without status migrainosus, not intractable  -     CBC Auto Differential; Future; Expected date: 01/15/2025  -     Comprehensive Metabolic Panel; Future; Expected date: 01/15/2025  -     Hemoglobin A1C; Future; Expected date: 01/15/2025  -     Lipid Panel; Future; Expected date: 01/15/2025  -     TSH; Future; Expected date: 01/15/2025    11. Anxiety  -     CBC Auto Differential; Future; Expected date: 01/15/2025  -     Comprehensive Metabolic Panel; Future; Expected date: 01/15/2025  -     Hemoglobin A1C; Future; Expected date: 01/15/2025  -     Lipid Panel; Future; Expected date: 01/15/2025  -     TSH; Future; Expected date: 01/15/2025             Nicol Shell MD

## 2025-02-05 ENCOUNTER — OFFICE VISIT (OUTPATIENT)
Dept: DERMATOLOGY | Facility: CLINIC | Age: 25
End: 2025-02-05
Payer: COMMERCIAL

## 2025-02-05 DIAGNOSIS — B07.0 PLANTAR WART: ICD-10-CM

## 2025-02-05 DIAGNOSIS — B07.8 PALMAR WART: Primary | ICD-10-CM

## 2025-02-05 PROCEDURE — 99999 PR PBB SHADOW E&M-EST. PATIENT-LVL III: CPT | Mod: PBBFAC,,, | Performed by: STUDENT IN AN ORGANIZED HEALTH CARE EDUCATION/TRAINING PROGRAM

## 2025-02-05 PROCEDURE — 99499 UNLISTED E&M SERVICE: CPT | Mod: S$GLB,,, | Performed by: STUDENT IN AN ORGANIZED HEALTH CARE EDUCATION/TRAINING PROGRAM

## 2025-02-05 PROCEDURE — 17110 DESTRUCTION B9 LES UP TO 14: CPT | Mod: S$GLB,,, | Performed by: STUDENT IN AN ORGANIZED HEALTH CARE EDUCATION/TRAINING PROGRAM

## 2025-02-05 NOTE — PROGRESS NOTES
Subjective:      Patient ID:  Kiya Renae is a 24 y.o. female who presents for   Chief Complaint   Patient presents with    Warts     F/u      Pt came present for follow up on warts - which is improving.     This is pt 3rd round of freezing     Has had warts previously on foot.            Previously seen for acne and psoriasis. Here for wart treatment for 3rd treatment on finger and 2nd treatment on foot of LN2 and IL candida    Review of Systems    Objective:   Physical Exam   Constitutional: She appears well-developed and well-nourished. No distress.   Neurological: She is alert and oriented to person, place, and time. She is not disoriented.   Psychiatric: She has a normal mood and affect.   Skin:   Areas Examined (abnormalities noted in diagram):   LUE Inspection Performed  Nails and Digits Inspection Performed                    Diagram Legend     Erythematous scaling macule/papule c/w actinic keratosis       Vascular papule c/w angioma      Pigmented verrucoid papule/plaque c/w seborrheic keratosis      Yellow umbilicated papule c/w sebaceous hyperplasia      Irregularly shaped tan macule c/w lentigo     1-2 mm smooth white papules consistent with Milia      Movable subcutaneous cyst with punctum c/w epidermal inclusion cyst      Subcutaneous movable cyst c/w pilar cyst      Firm pink to brown papule c/w dermatofibroma      Pedunculated fleshy papule(s) c/w skin tag(s)      Evenly pigmented macule c/w junctional nevus     Mildly variegated pigmented, slightly irregular-bordered macule c/w mildly atypical nevus      Flesh colored to evenly pigmented papule c/w intradermal nevus       Pink pearly papule/plaque c/w basal cell carcinoma      Erythematous hyperkeratotic cursted plaque c/w SCC      Surgical scar with no sign of skin cancer recurrence      Open and closed comedones      Inflammatory papules and pustules      Verrucoid papule consistent consistent with wart     Erythematous eczematous  patches and plaques     Dystrophic onycholytic nail with subungual debris c/w onychomycosis     Umbilicated papule    Erythematous-base heme-crusted tan verrucoid plaque consistent with inflamed seborrheic keratosis     Erythematous Silvery Scaling Plaque c/w Psoriasis     See annotation        Assessment / Plan:        Palmar wart    Plantar wart      Pared, treated with LN2 to all, and IL candida to 2 on bottom of foot  3rd treatment on finger and 2nd treatment on foot of LN2 and IL candida      - Counseled that lesions are caused by a virus and can therefore be spread to other people and other parts of patients body. Explained that they typically resolve over 1-2 years but treatment can hasten resolution. Treatment options include topicals (imiquimod, fluorouracil, salicylic acid, retinoids, podophyllin, cidofovir), destruction (cryotherapy, ED&C, cantharidin, laser), immunologic injections (candida, flu vaccine)   - compounded 5FU + 70% sal acid covered with duct tape or bandage nightly   -  Soak in warm water for 5 minutes then pare down with sandpaper (use each piece only once)     Procedure note for Candida Antigen injection:    Discussed risks of procedure including local redness, swelling, and lymph node enlargement. Verbal consent obtained. Area cleaned with alcohol. 0.3cc of Candida antigen injected intradermally at the base of the verruca. Patient tolerated well.   This was patient's 1 cycle of Candida Antigen.   NDC for Candida Antigen: 50257-426-71    Cryosurgery procedure note:  Verbal consent from the patient is obtained including, but not limited to, risk of hypopigmentation/hyperpigmentation, scar, recurrence of lesion. Liquid nitrogen cryosurgery is applied to 4 verruca with prior paring, as detailed in the physical exam, to produce a freeze injury. 3 consecutive freeze thaw cycles are applied to each verruca. The patient is aware that blisters (possibly blood blisters) may form.    3 cycles, 15  - 20 seconds each         Follow up in about 3 weeks (around 2/26/2025).

## 2025-02-05 NOTE — PATIENT INSTRUCTIONS

## 2025-02-18 ENCOUNTER — OFFICE VISIT (OUTPATIENT)
Dept: RHEUMATOLOGY | Facility: CLINIC | Age: 25
End: 2025-02-18
Payer: COMMERCIAL

## 2025-02-18 VITALS
HEIGHT: 68 IN | WEIGHT: 164.25 LBS | BODY MASS INDEX: 24.89 KG/M2 | HEART RATE: 67 BPM | DIASTOLIC BLOOD PRESSURE: 73 MMHG | SYSTOLIC BLOOD PRESSURE: 125 MMHG

## 2025-02-18 DIAGNOSIS — Z79.899 DRUG-INDUCED IMMUNODEFICIENCY: ICD-10-CM

## 2025-02-18 DIAGNOSIS — L40.50 PSORIATIC ARTHRITIS: Primary | ICD-10-CM

## 2025-02-18 DIAGNOSIS — R25.1 TREMOR: ICD-10-CM

## 2025-02-18 DIAGNOSIS — D84.821 DRUG-INDUCED IMMUNODEFICIENCY: ICD-10-CM

## 2025-02-18 DIAGNOSIS — K50.90 CROHN'S DISEASE WITHOUT COMPLICATION, UNSPECIFIED GASTROINTESTINAL TRACT LOCATION: ICD-10-CM

## 2025-02-18 RX ORDER — TRIAMCINOLONE ACETONIDE 40 MG/ML
40 INJECTION, SUSPENSION INTRA-ARTICULAR; INTRAMUSCULAR
Status: COMPLETED | OUTPATIENT
Start: 2025-02-18 | End: 2025-02-18

## 2025-02-18 RX ADMIN — TRIAMCINOLONE ACETONIDE 40 MG: 40 INJECTION, SUSPENSION INTRA-ARTICULAR; INTRAMUSCULAR at 02:02

## 2025-02-18 NOTE — PROGRESS NOTES
RHEUMATOLOGY OUTPATIENT CLINIC NOTE    2/18/2025    Attending Rheumatologist: Trice Coombs  Primary Care Provider: Nicol Shell MD   Physician Requesting Consultation: No referring provider defined for this encounter.  Chief Complaint/Reason For Consultation:  Psoriatic Arthritis      Subjective:       HPI  Kiya Renae is a 25 y.o. White female with Crohn's Disease, tremors who comes for evaluation of joint pain.     Interim history  -last visit on 8/2024   -she continues to be on skyrizi for IBD.   -she noted that her hands feel painful and swelling for the past couple of weeks. She usually feels ok after taking skyrizi but she took it about 10 days ago and her symptoms have not resolved. In the past 6 weeks she had norovirus, flu A and pneumonia. She had to delay skyrizi only for a couple of days.   -she feels that her IBD symptoms are stable. Tremors are a little worse and propanolol has helped but have been taking it more often.     8/2024  -Follows with GI/IBD clinic - had EGD/colonoscopy did not show active Crohn's. But she is still having hematochezia. Plan to have MRE and referral to colorectal surgeon to evaluate for rectal causes of pain/bleeding.   -Established care with Dr. Myrick for scalp psoriasis, using topicals.   -Had traumatic head injury while practicing [employer]. Was prescribed medrol dose pack but never used it. It improved.   -labs from 6/2024 showed negative quantiferon, hepatitis B serologies. CBC normal. Normal liver function. CRP normal.   -having some tremors in hands and left leg for the past week, and also pain in bilateral 2nd and 3rd fingers, with mild swelling. Has been taking tylenol.   Denies any prolonged morning stiffness   -she is receiving skyrizi from her GI in [place], no issues with refills so far.     Disease history    Used to follow with rheumatology in [place]. Records reviewed. Last visit in 7/2022.   She has diagnosis of PsA based on prior dactylitis and  psoriasis. Negative serologies. Methotrexate caused nausea. Failed nabumetone.   Otezla - developed increased depression and GI upset.   Skyrizi - significant improvement of joint and skin disease  Then, diagnosed with Crohn's Disease - skyrizi dose was increased to IBD dose. This was in 8/2023 by GI in [place].   Colonoscopy in 8/2023 reportedly normal. However GI from [place] felt that there were some active lesions from pictures on colonoscopy.     She has history of lyme disease. Was on minocycline for a while. Stopped about a year and a half.     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   Has been dealing with different neurological issues. Significant tremor in left hand along with left leg numbness at times. Sees neurology. So far, workup has been unrevealing. MRI brain and c-spine are essentially unremarkable. EMG/NCS of left upper and lower extremity was normal. Autoimmune movement disorder panel is negative. Symptoms improve with propanolol high doses which she is able to tolerate. She is concerned that this could be related to MS.     Her psoriasis and joint pain have been stable. No more dactylitis. Occasional low back pain. No eye inflammation.   PCP prescribed diflucan for possible fungal infection as she has been dealing with cracked lip commissure. Also feels that her sinuses are clogged. She notes that this happens almost at the end of the skyrizi cycle, and symptoms slowly start to improve afterwards.   She is due for skyrizi tomorrow. Receives refills from her GI in [place]  She reports that last week she had one episode of bloody stool and since then has constipation. She has not tried any OTC medications.       12/2023  MELYSSA negative  Anti histone 1.1 (<0.9)  Negative DSDNA, SSA, SSB, RNA eula 3, Scl-70, SM/RNP, TH/To  RF, CCP negative  Extended myositis panel negative    9/2023  Lyme antibodies negative  TPO negative  ESR and CRP negative    Chronic comorbid conditions affecting  medical decision making today:  Past Medical History:   Diagnosis Date    Allergy     Anxiety 07/31/2021    Arthritis 12/30/2021    PsA    IBD (inflammatory bowel disease)     Joint pain     Lyme disease, unspecified     Psoriasis     Skin disease 12/30/2021    psorias on scalp, behind ears, and inside ear canal    Subclinical hypothyroidism 07/05/2023     Past Surgical History:   Procedure Laterality Date    COLONOSCOPY N/A 08/22/2023    Procedure: COLONOSCOPY;  Surgeon: Stanton Ferrell MD;  Location: [facility];  Service: Endoscopy;  Laterality: N/A;    COLONOSCOPY N/A 6/25/2024    Procedure: COLONOSCOPY;  Surgeon: Jose Yoon MD;  Location: [facility] (4TH FLR);  Service: Endoscopy;  Laterality: N/A;    ESOPHAGOGASTRODUODENOSCOPY N/A 6/25/2024    Procedure: EGD (ESOPHAGOGASTRODUODENOSCOPY);  Surgeon: Jose Yoon MD;  Location: [facility] (4TH FLR);  Service: Endoscopy;  Laterality: N/A;  urgent case/ per np a labat / ok to add per alfonso/ prep instructions given in clinic / peg prep  6/24-precall complete, pt notified of earlier arrival time (12:20pm)-KPvt    INTRALUMINAL GASTROINTESTINAL TRACT IMAGING VIA CAPSULE N/A 9/9/2024    Procedure: IMAGING PROCEDURE, GI TRACT, INTRALUMINAL, VIA CAPSULE;  Surgeon: Familia Jackson, First Available;  Location: [facility] (4TH FLR);  Service: Endoscopy;  Laterality: N/A;  PERSON/PORTAL-MIRALAX-DW  8/30-lvm for pre call-tb  9/6-lvm for precall-Kpvt    UPPER GASTROINTESTINAL ENDOSCOPY  05/2023    in [place], minor swelling to lower esophagus per patient report     Family History   Problem Relation Name Age of Onset    Alcohol abuse Mother Olga Renae     Asthma Mother Olga Renae     Rheum arthritis Mother Olga Renae     Psoriasis Mother Olga Renae     Alcohol abuse Father Adrián Renae Jr     Migraines Father Adrián Renae Jr     Acne Father Adrián Renae Jr     Depression Brother Omari Renae     Suicidality Brother Omari Renae     Eczema Brother Omari Renae     Crohn's disease  Paternal Aunt Jennifer Cranor     Breast cancer Paternal Aunt Jennifer Cranor     Lupus Paternal Aunt Jennifer Cranor     Migraines Paternal Aunt Jennifer Cranor     Diabetes Maternal Grandmother      Heart failure Maternal Grandfather      Breast cancer Paternal Grandmother      Heart attack Paternal Grandmother      Colon cancer Neg Hx      Esophageal cancer Neg Hx      Stomach cancer Neg Hx       Social History     Substance and Sexual Activity   Alcohol Use Not Currently    Comment: only socially and a very small amount if ever (one or two)     Social History     Tobacco Use   Smoking Status Never    Passive exposure: Never   Smokeless Tobacco Never     Social History     Substance and Sexual Activity   Drug Use Never       Current Outpatient Medications:     azelastine (ASTELIN) 137 mcg (0.1 %) nasal spray, 1 spray (137 mcg total) by Nasal route 2 (two) times daily., Disp: 30 mL, Rfl: 3    clobetasoL (TEMOVATE) 0.05 % external solution, Apply topically once daily. Use to affected areas for up to 2 weeks then take a 1 week break or decrease to 3 times weekly. Do not apply to groin or face, Disp: 50 mL, Rfl: 2    dicyclomine (BENTYL) 20 mg tablet, Take 1 tablet (20 mg total) by mouth every 8 (eight) hours as needed (abdominal cramping)., Disp: 120 tablet, Rfl: 0    ergocalciferol (VITAMIN D2) 50,000 unit Cap, Take 1 capsule (50,000 Units total) by mouth every 7 days., Disp: 8 capsule, Rfl: 0    fluocinolone acetonide oiL 0.01 % Drop, Place 1 application in ear(s) once daily. Use to affected areas for up to 2 weeks then take a 1 week break or decrease to 3 times weekly. For use in ears, Disp: 20 mL, Rfl: 2    fluticasone propionate (FLONASE) 50 mcg/actuation nasal spray, 2 sprays (100 mcg total) by Each Nostril route once daily., Disp: 9.9 mL, Rfl: 11    gabapentin (NEURONTIN) 100 MG capsule, Take 100 mg by mouth daily as needed., Disp: , Rfl:     hydrocortisone (ANUSOL-HC) 2.5 % rectal cream, Place rectally 2 (two) times  daily., Disp: 28 g, Rfl: 2    ibuprofen (ADVIL,MOTRIN) 800 MG tablet, Take 1 tablet (800 mg total) by mouth every 6 (six) hours as needed for Pain., Disp: 60 tablet, Rfl: 0    ondansetron (ZOFRAN) 8 MG tablet, Take 8 mg by mouth every 8 (eight) hours as needed for Nausea., Disp: , Rfl:     pantoprazole (PROTONIX) 40 MG tablet, Take 1 tablet (40 mg total) by mouth once daily., Disp: 30 tablet, Rfl: 3    polyethylene glycol (GLYCOLAX) 17 gram/dose powder, Take 17 g by mouth once daily. Mix with 8 oz of water/liquid., Disp: 510 g, Rfl: 2    propranoloL (INDERAL LA) 80 MG 24 hr capsule, Take 1 capsule (80 mg total) by mouth once daily., Disp: 90 capsule, Rfl: 3    rimegepant (NURTEC) 75 mg odt, Take 75 mg by mouth once as needed for Migraine. Place ODT tablet on the tongue; alternatively the ODT tablet may be placed under the tongue, Disp: , Rfl:     sertraline (ZOLOFT) 100 MG tablet, Take 100 mg by mouth., Disp: , Rfl:     sertraline 200 mg Cap, , Disp: , Rfl:     SKYRIZI 360 mg/2.4 mL (150 mg/mL) Injt, Inject 360 mg into the skin. Every 8 weeks, Disp: , Rfl:     topiramate (TOPAMAX) 25 MG tablet, Take 1 tablet (25 mg total) by mouth once daily., Disp: 30 tablet, Rfl: 0    tretinoin (RETIN-A) 0.05 % cream, Apply topically every evening. Start twice weekly and increase as tolerated. Pea sized amount to entire face., Disp: 20 g, Rfl: 6    Current Facility-Administered Medications:     triamcinolone acetonide injection 10 mg, 10 mg, Intradermal, Once,     triamcinolone acetonide injection 40 mg, 40 mg, Intramuscular, 1 time in Clinic/HOD,      Objective:         Vitals:    02/18/25 1352   BP: 125/73   Pulse: 67         Physical Exam   Constitutional: She is oriented to person, place, and time. She appears well-developed.   HENT:   Head: Normocephalic.   Mouth/Throat: Mucous membranes are moist.   Pulmonary/Chest: Effort normal.   Musculoskeletal:      Cervical back: Neck supple.      Comments: Cspine FROM no  "tenderness  Tspine FROM no tenderness  Lspine FROM no tenderness.  Shoulders: FROM; no synovitis;  Elbows: FROM; no synovitis; no tophi or nodules  Wrists: FROM; no synovitis;    Hands: mild swelling in bilateral 2-3 PIP  HIPS: FROM  Knees: FROM; no synovitis; no instability  Ankles: FROM: no synovitis   Toes: no tenderness on palpation.     Lymphadenopathy:     She has no cervical adenopathy.   Neurological: She is alert and oriented to person, place, and time.   Skin: No rash noted.   No skin rashes   Vitals reviewed.      Reviewed old and all outside pertinent medical records available.    All lab results personally reviewed and interpreted by me.  Lab Results   Component Value Date    WBC 7.61 01/15/2025    HGB 13.1 01/15/2025    HCT 39.4 01/15/2025    MCV 92 01/15/2025    MCH 30.5 01/15/2025    MCHC 33.2 01/15/2025    RDW 11.9 01/15/2025     01/15/2025    MPV 12.6 01/15/2025       Lab Results   Component Value Date     01/15/2025    K 4.0 01/15/2025     01/15/2025    CO2 25 01/15/2025    GLU 65 (L) 01/15/2025    BUN 18 01/15/2025    CALCIUM 9.1 01/15/2025    PROT 6.6 01/15/2025    ALBUMIN 3.8 01/15/2025    BILITOT 0.3 01/15/2025    AST 31 01/15/2025    ALKPHOS 41 01/15/2025    ALT 40 01/15/2025       Lab Results   Component Value Date    COLORU Yellow 09/05/2023    APPEARANCEUA Clear 09/05/2023    SPECGRAV >1.030 (A) 09/05/2023    PHUR 7.0 09/05/2023    PROTEINUA Trace (A) 09/05/2023    KETONESU Negative 09/05/2023    LEUKOCYTESUR 1+ (A) 09/05/2023    NITRITE Negative 09/05/2023    UROBILINOGEN Negative 09/05/2023       Lab Results   Component Value Date    CRP <0.3 08/06/2024       Lab Results   Component Value Date    RF <13.0 12/13/2023    SEDRATE <2 09/15/2023    CCPANTIBODIE 0.7 12/13/2023       No components found for: "25OHVITDTOT", "45EWYMTC7", "93ZHQXLE7", "METHODNOTE"    No results found for: "URICACID"    Lab Results   Component Value Date    HEPBSAG Non-reactive 06/04/2024    " HEPCAB Non-reactive 04/24/2023       Imaging:  All imaging reviewed and independently interpreted by me.       ASSESSMENT / PLAN:     Kiya Renae is a 25 y.o. White female with:    1. Psoriatic arthritis- active and progressing   -based on prior dactylitis and psoriasis. Negative serologies. Methotrexate caused nausea. Failed nabumetone.   -prev meds: Otezla - developed increased depression and GI upset. MTX PO caused nausea. Skyrizi - significant improvement of joint and skin disease  -continue skyrizi 360 mg every 8 weeks (IBD dose) per GI. She receives refills from her GI in [place]. Advised her to inform her GI and myself if she runs into any problems with refills.   -she is currently on a flare now, may have been triggered by repeated respiratory infections in the past 6 weeks  -at this moment, will proceed with triamcinolone IM. Avoid oral steroids due to previous SE.   -if joint pain does not improve or progresses, consider adding MTX SC or leflunomide. If there is a decision to change biologics, could consider  Moy or IL-12/23, will need to coordinate with GI Would avoid TNF given neurological symptoms for now.     2. Drug-induced immunodeficiency  - recent labs reviewed  - no live vaccines  - vaccines per guidelines   - immunosuppression/infectious precautions reinforced      3. Crohn's disease without complication, unspecified gastrointestinal tract location  - appears to be stable on skyrizi every 8 weeks per GI.   - Recent MRE was normal.   - follows with Dr. Yoon     4. Tremor  - Unknown etiology. Workup per neurology unrevealing. No evidence of MS, which is patient's biggest concern. MELYSSA negative. Extensive antibody testing has been negative with exception of borderline anti histone which can be positive due to prior use of minocycline.   - controlled on propanolol   - neuro recommended endo follow up due to high TSH   - will have yearly MRIs   - defer management to neurology     Follow up in about  3 months (around 5/18/2025). Or sooner if worsening symptoms    Method of contact with patient concerns: [facility]    Disclaimer:  This note is prepared using voice recognition software and as such is likely to have errors and has not been proof read. Please contact me for questions.     Trice Coombs M.D.  Rheumatology  [facility]
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
## 2025-02-25 ENCOUNTER — OFFICE VISIT (OUTPATIENT)
Facility: CLINIC | Age: 25
End: 2025-02-25
Payer: COMMERCIAL

## 2025-02-25 VITALS
DIASTOLIC BLOOD PRESSURE: 70 MMHG | BODY MASS INDEX: 24.25 KG/M2 | WEIGHT: 160 LBS | HEART RATE: 57 BPM | HEIGHT: 68 IN | SYSTOLIC BLOOD PRESSURE: 113 MMHG

## 2025-02-25 DIAGNOSIS — Z86.19 H/O LYME DISEASE: ICD-10-CM

## 2025-02-25 DIAGNOSIS — S13.4XXA WHIPLASH INJURY TO NECK, INITIAL ENCOUNTER: ICD-10-CM

## 2025-02-25 DIAGNOSIS — F44.4 FUNCTIONAL NEUROLOGICAL SYMPTOM DISORDER WITH ABNORMAL MOVEMENT: ICD-10-CM

## 2025-02-25 DIAGNOSIS — S06.0X9S CONCUSSION WITH LOSS OF CONSCIOUSNESS, SEQUELA: ICD-10-CM

## 2025-02-25 DIAGNOSIS — R25.1 TREMOR: Primary | ICD-10-CM

## 2025-02-25 DIAGNOSIS — R29.90 NEUROLOGICAL DYSFUNCTION: ICD-10-CM

## 2025-02-25 DIAGNOSIS — Z71.89 COUNSELING REGARDING GOALS OF CARE: ICD-10-CM

## 2025-02-25 PROCEDURE — 99999 PR PBB SHADOW E&M-EST. PATIENT-LVL IV: CPT | Mod: PBBFAC,,, | Performed by: PHYSICIAN ASSISTANT

## 2025-03-14 ENCOUNTER — HOSPITAL ENCOUNTER (OUTPATIENT)
Dept: RADIOLOGY | Facility: HOSPITAL | Age: 25
Discharge: HOME | End: 2025-03-14
Attending: PHYSICIAN ASSISTANT
Payer: COMMERCIAL

## 2025-03-14 DIAGNOSIS — S06.0X9S CONCUSSION WITH LOSS OF CONSCIOUSNESS, SEQUELA: ICD-10-CM

## 2025-03-14 DIAGNOSIS — S13.4XXA WHIPLASH INJURY TO NECK, INITIAL ENCOUNTER: ICD-10-CM

## 2025-03-14 PROCEDURE — 70551 MRI BRAIN STEM W/O DYE: CPT | Mod: TC

## 2025-03-18 ENCOUNTER — RESULTS FOLLOW-UP (OUTPATIENT)
Facility: CLINIC | Age: 25
End: 2025-03-18

## 2025-06-17 ENCOUNTER — OFFICE VISIT (OUTPATIENT)
Dept: OPTOMETRY | Facility: CLINIC | Age: 25
End: 2025-06-17
Payer: COMMERCIAL

## 2025-06-17 DIAGNOSIS — Z13.5 GLAUCOMA SCREENING: ICD-10-CM

## 2025-06-17 DIAGNOSIS — H57.12 PAIN OF LEFT EYE: ICD-10-CM

## 2025-06-17 DIAGNOSIS — H01.119 ALLERGIC CONTACT DERMATITIS OF EYELID: ICD-10-CM

## 2025-06-17 DIAGNOSIS — G43.001 MIGRAINE WITHOUT AURA AND WITH STATUS MIGRAINOSUS, NOT INTRACTABLE: ICD-10-CM

## 2025-06-17 DIAGNOSIS — H43.393 VITREOUS FLOATERS, BILATERAL: ICD-10-CM

## 2025-06-17 DIAGNOSIS — H57.12 RETRO-ORBITAL PAIN OF LEFT EYE: Primary | ICD-10-CM

## 2025-06-17 PROCEDURE — 3044F HG A1C LEVEL LT 7.0%: CPT | Mod: CPTII,S$GLB,, | Performed by: OPTOMETRIST

## 2025-06-17 PROCEDURE — 92133 CPTRZD OPH DX IMG PST SGM ON: CPT | Mod: GZ,S$GLB,, | Performed by: OPTOMETRIST

## 2025-06-17 PROCEDURE — 99204 OFFICE O/P NEW MOD 45 MIN: CPT | Mod: S$GLB,,, | Performed by: OPTOMETRIST

## 2025-06-17 PROCEDURE — 99999 PR PBB SHADOW E&M-EST. PATIENT-LVL III: CPT | Mod: PBBFAC,,, | Performed by: OPTOMETRIST

## 2025-06-17 PROCEDURE — 1159F MED LIST DOCD IN RCRD: CPT | Mod: CPTII,S$GLB,, | Performed by: OPTOMETRIST

## 2025-06-17 NOTE — PATIENT INSTRUCTIONS
"DRY EYES -- BURNING OR JUNITO SYMPTOMS:  Use Over The Counter artificial tears as needed for dry eye symptoms.   Some common brands include:  Systane, Optive, Refresh, and Thera-Tears.  These drops can be used as frequently as desired, but may be most helpful use during long periods of concentrated work.  For example, reading / working at the computer. Start with 3-4x per day.     Nighttime Ophthalmic gel or ointments are available: Refresh PM, Genteal, and Lacrilube.    Avoid drops that "get redness out" (Visine, Murine, Clear Eyes), as these may contain medication that could further irritate the eyes, especially with chronic use.    ALLERGY EYES -- ITCHING SYMPTOMS:  Over the counter medications include--Pataday, Zaditor, and Alaway.  Use as directed 1-2 drops daily for symptoms of itching / watering eyes.  These drops will not help for dry eye or exposure symptoms.    REDNESS RELIEF:  Lumify---is a good redness reliever that will not cause irritation if used chronically.        FLASHES / FLOATERS / POSTERIOR VITREOUS DETACHMENT    Call the clinic if you have any further changes in symptoms.  Including:  Increased numbers of floaters or flashing lights, dimness or darkness that moves through or stays constant in your vision, or any pain in the eye (s).    You may sometimes see small specks or clouds moving in your field of vision.  They are called FLOATERS.  You can often see them when looking at a plain background, like a blank wall or blue jaskaran.  Floaters are actually tiny clumps of gel or cells inside the VITREOUS, the clear jelly-like fluid that fills the inside of your eye.    While these objects look like they are in front of your eye, they are actually floating inside.  What you see are the shadows they cast on the RETINA, the nerve layer at the back of the eye that senses light and allows you to see.      POSTERIOR VITREOUS DETACHMENT    The appearance of new floaters may be alarming.  If you suddenly " develop new floaters, you should contact your eye care professional  right away.    The retina can tear if the shrinking vitreous pulls away from the wall of the eye.  This sometimes causes a small amount of bleeding in the eye that may appear as new floaters.    A torn retina is always a serious problem, since it can lead to a retinal detachment.  You should see your eye care professional as soon as possible if:    even one new floater appears suddenly;  you see sudden flashes of light;  you notice other symptoms, like the loss of side vision, or a curtain closes down in your vision        POSTERIOR VITREOUS DETACHMENT is more common for people who:    are nearsighted;  have had cataract surgery;  have had YAG laser surgery of the eye;  have had inflammation inside the eye;  are over age 60.      While some floaters may remain visible, many of them will fade over time and become less noticeable.  Even if you've had some floaters for years, you should have your eyes checked as soon as possible if you notice new ones.    FLASHING LIGHTS    When the vitreous gel rubs or pulls on the retina, you may see what look like flashing lights or lightning streaks.  These flashes can appear off and on for several weeks or months.      Some people experience flashes of light that appear as jagged lines or heat waves in both eyes, lasting 10-20 minutes.  These flashes are caused by a spasm of blood vessels in the brain, which is called a migraine.    If a headache follows these flashes, it's called a migraine headache.  If   no headache occurs, these flashes are called Ophthalmic or Ocular Migraine.

## 2025-06-17 NOTE — Clinical Note
Lisa, Ms Renae has a longstanding h/o migraine and tx on the SS and would like to establish care here. Thanks in advance.

## 2025-06-18 NOTE — PROGRESS NOTES
HPI    Urgent care-OS pain x 2 weeks    Pt states she was having an allergic reaction under lids to something that   touched eyes. States it cleared up but with ocusoft. Started to get a   migraine and took Nurtec but headache has not gone away. States pain in   only behind left eye now-states its making her nauseous and trouble   keeping food down. Feels like eye is popping out. Some light sensitivity.   Some blurred va- no glasses or contacts.   Last edited by Patricia Roman on 6/17/2025  3:40 PM.            Assessment /Plan     For exam results, see Encounter Report.    Retro-orbital pain of left eye  -     CT Orbits Sella Post Fossa Without Cont; Future; Expected date: 06/17/2025  -     Posterior Segment OCT Optic Nerve- Both eyes    Pain of left eye  -     CT Orbits Sella Post Fossa Without Cont; Future; Expected date: 06/17/2025  -     Posterior Segment OCT Optic Nerve- Both eyes    Migraine without aura and with status migrainosus, not intractable    Glaucoma screening    Allergic contact dermatitis of eyelid    Vitreous floaters, bilateral      1,2.   OS w/ retro orbit transient pain, possible worse on down position of head   Ocular/ visual / neuro health appears intact     OCT rnfl 6/2025   G 014 bord thin sup   G 115 wnl  No gross edema/ no papilledema     Schedule CT non contrast to r/o     3.   Longstanding h/o migraine, tx, controlled w/ multi meds   4.   Not suspect   5.   Recent contact derm episode , LLL from pt images --resolved   6.   RD precautions given and reviewed. Patient knows to call/ message if any further changes in symptoms occur.    No Rx needed for vision    Message if any further changes in comfort / vision if grossly worsening ---> to ED   CT scheduled for next week, f/u pending

## 2025-06-19 ENCOUNTER — TELEPHONE (OUTPATIENT)
Dept: NEUROLOGY | Facility: CLINIC | Age: 25
End: 2025-06-19
Payer: COMMERCIAL

## 2025-06-19 NOTE — TELEPHONE ENCOUNTER
Left message calling to schedule in the Headache clinic. We received a request from Dr Smith. [employer] we have here in [place] or on the [facility]. Both numbers were given on the message.

## 2025-06-25 ENCOUNTER — HOSPITAL ENCOUNTER (OUTPATIENT)
Dept: RADIOLOGY | Facility: HOSPITAL | Age: 25
Discharge: HOME | End: 2025-06-25
Attending: OPTOMETRIST
Payer: COMMERCIAL

## 2025-06-25 ENCOUNTER — RESULTS FOLLOW-UP (OUTPATIENT)
Dept: OPTOMETRY | Facility: CLINIC | Age: 25
End: 2025-06-25

## 2025-06-25 DIAGNOSIS — H57.12 PAIN OF LEFT EYE: ICD-10-CM

## 2025-06-25 DIAGNOSIS — H57.12 RETRO-ORBITAL PAIN OF LEFT EYE: ICD-10-CM

## 2025-06-25 PROCEDURE — 70480 CT ORBIT/EAR/FOSSA W/O DYE: CPT | Mod: TC,PO

## 2025-06-25 PROCEDURE — 70480 CT ORBIT/EAR/FOSSA W/O DYE: CPT | Mod: 26,,, | Performed by: RADIOLOGY

## 2025-07-02 ENCOUNTER — OFFICE VISIT (OUTPATIENT)
Dept: DERMATOLOGY | Facility: CLINIC | Age: 25
End: 2025-07-02
Payer: COMMERCIAL

## 2025-07-02 DIAGNOSIS — B07.9 VIRAL WARTS, UNSPECIFIED TYPE: Primary | ICD-10-CM

## 2025-07-02 PROCEDURE — 99999 PR PBB SHADOW E&M-EST. PATIENT-LVL III: CPT | Mod: PBBFAC,,, | Performed by: STUDENT IN AN ORGANIZED HEALTH CARE EDUCATION/TRAINING PROGRAM

## 2025-07-02 NOTE — PROGRESS NOTES
Subjective:      Patient ID:  Kiya Renae is a 25 y.o. female who presents for   Chief Complaint   Patient presents with    Warts     Pt present for follow up on warts - which is improving.     This is pt 4th round of freezing     Has had warts previously on foot.            Previously seen for acne and psoriasis. Here for wart treatment for 4th treatment on finger and 3rd treatment on foot of LN2 and IL candida    Review of Systems    Objective:   Physical Exam   Constitutional: She appears well-developed and well-nourished. No distress.   Neurological: She is alert and oriented to person, place, and time. She is not disoriented.   Psychiatric: She has a normal mood and affect.   Skin:   Areas Examined (abnormalities noted in diagram):   LUE Inspection Performed  Nails and Digits Inspection Performed                    Diagram Legend     Erythematous scaling macule/papule c/w actinic keratosis       Vascular papule c/w angioma      Pigmented verrucoid papule/plaque c/w seborrheic keratosis      Yellow umbilicated papule c/w sebaceous hyperplasia      Irregularly shaped tan macule c/w lentigo     1-2 mm smooth white papules consistent with Milia      Movable subcutaneous cyst with punctum c/w epidermal inclusion cyst      Subcutaneous movable cyst c/w pilar cyst      Firm pink to brown papule c/w dermatofibroma      Pedunculated fleshy papule(s) c/w skin tag(s)      Evenly pigmented macule c/w junctional nevus     Mildly variegated pigmented, slightly irregular-bordered macule c/w mildly atypical nevus      Flesh colored to evenly pigmented papule c/w intradermal nevus       Pink pearly papule/plaque c/w basal cell carcinoma      Erythematous hyperkeratotic cursted plaque c/w SCC      Surgical scar with no sign of skin cancer recurrence      Open and closed comedones      Inflammatory papules and pustules      Verrucoid papule consistent consistent with wart     Erythematous eczematous patches and plaques      Dystrophic onycholytic nail with subungual debris c/w onychomycosis     Umbilicated papule    Erythematous-base heme-crusted tan verrucoid plaque consistent with inflamed seborrheic keratosis     Erythematous Silvery Scaling Plaque c/w Psoriasis     See annotation              Assessment / Plan:        Viral warts, unspecified type        Pared, treated with LN2 to all, and IL candida to 1 on foot and 2 on knee  Lesions on palms and soles nearly completely resolved  2 new lesions on knee  4th treatment on finger and 3rd treatment on foot of LN2 and IL candida      - Counseled that lesions are caused by a virus and can therefore be spread to other people and other parts of patients body. Explained that they typically resolve over 1-2 years but treatment can hasten resolution. Treatment options include topicals (imiquimod, fluorouracil, salicylic acid, retinoids, podophyllin, cidofovir), destruction (cryotherapy, ED&C, cantharidin, laser), immunologic injections (candida, flu vaccine)   - compounded 5FU + 70% sal acid covered with duct tape or bandage nightly   -  Soak in warm water for 5 minutes then pare down with sandpaper (use each piece only once)     Procedure note for Candida Antigen injection:    Discussed risks of procedure including local redness, swelling, and lymph node enlargement. Verbal consent obtained. Area cleaned with alcohol. 0.3cc of Candida antigen injected intradermally at the base of the verruca. Patient tolerated well.   This was patient's 1 cycle of Candida Antigen.   NDC for Candida Antigen: 50551-935-21    Cryosurgery procedure note:  Verbal consent from the patient is obtained including, but not limited to, risk of hypopigmentation/hyperpigmentation, scar, recurrence of lesion. Liquid nitrogen cryosurgery is applied to 4 verruca with prior paring, as detailed in the physical exam, to produce a freeze injury. 3 consecutive freeze thaw cycles are applied to each verruca. The patient is  aware that blisters (possibly blood blisters) may form.    3 cycles, 15 - 20 seconds each         Follow up in about 3 weeks (around 7/23/2025).

## 2025-07-16 ENCOUNTER — OFFICE VISIT (OUTPATIENT)
Dept: PRIMARY CARE CLINIC | Facility: CLINIC | Age: 25
End: 2025-07-16
Payer: COMMERCIAL

## 2025-07-16 ENCOUNTER — E-CONSULT (OUTPATIENT)
Dept: NEUROLOGY | Facility: HOSPITAL | Age: 25
End: 2025-07-16
Payer: COMMERCIAL

## 2025-07-16 ENCOUNTER — LAB VISIT (OUTPATIENT)
Dept: LAB | Facility: HOSPITAL | Age: 25
End: 2025-07-16
Attending: STUDENT IN AN ORGANIZED HEALTH CARE EDUCATION/TRAINING PROGRAM
Payer: COMMERCIAL

## 2025-07-16 VITALS
BODY MASS INDEX: 25.42 KG/M2 | DIASTOLIC BLOOD PRESSURE: 88 MMHG | OXYGEN SATURATION: 100 % | HEIGHT: 68 IN | SYSTOLIC BLOOD PRESSURE: 120 MMHG | HEART RATE: 63 BPM | WEIGHT: 167.75 LBS

## 2025-07-16 DIAGNOSIS — L67.8 ABNORMAL FACIAL HAIR: ICD-10-CM

## 2025-07-16 DIAGNOSIS — R25.1 TREMOR: ICD-10-CM

## 2025-07-16 DIAGNOSIS — K50.911 CROHN'S DISEASE WITH RECTAL BLEEDING, UNSPECIFIED GASTROINTESTINAL TRACT LOCATION: ICD-10-CM

## 2025-07-16 DIAGNOSIS — Z86.19 H/O LYME DISEASE: ICD-10-CM

## 2025-07-16 DIAGNOSIS — Z00.00 ANNUAL PHYSICAL EXAM: ICD-10-CM

## 2025-07-16 DIAGNOSIS — G43.009 MIGRAINE WITHOUT AURA AND WITHOUT STATUS MIGRAINOSUS, NOT INTRACTABLE: ICD-10-CM

## 2025-07-16 DIAGNOSIS — R01.1 MURMUR: ICD-10-CM

## 2025-07-16 DIAGNOSIS — R51.9 NONINTRACTABLE HEADACHE, UNSPECIFIED CHRONICITY PATTERN, UNSPECIFIED HEADACHE TYPE: Primary | ICD-10-CM

## 2025-07-16 DIAGNOSIS — L40.50 PSORIATIC ARTHRITIS: ICD-10-CM

## 2025-07-16 DIAGNOSIS — Z79.899 OTHER LONG TERM (CURRENT) DRUG THERAPY: ICD-10-CM

## 2025-07-16 DIAGNOSIS — D84.9 IMMUNOCOMPROMISED STATE: ICD-10-CM

## 2025-07-16 DIAGNOSIS — Z00.00 ANNUAL PHYSICAL EXAM: Primary | ICD-10-CM

## 2025-07-16 LAB
ABSOLUTE EOSINOPHIL (OHS): 0.18 K/UL
ABSOLUTE MONOCYTE (OHS): 0.81 K/UL (ref 0.3–1)
ABSOLUTE NEUTROPHIL COUNT (OHS): 5.18 K/UL (ref 1.8–7.7)
BASOPHILS # BLD AUTO: 0.04 K/UL
BASOPHILS NFR BLD AUTO: 0.5 %
ERYTHROCYTE [DISTWIDTH] IN BLOOD BY AUTOMATED COUNT: 12.2 % (ref 11.5–14.5)
HCT VFR BLD AUTO: 39.7 % (ref 37–48.5)
HGB BLD-MCNC: 12.9 GM/DL (ref 12–16)
IMM GRANULOCYTES # BLD AUTO: 0.03 K/UL (ref 0–0.04)
IMM GRANULOCYTES NFR BLD AUTO: 0.4 % (ref 0–0.5)
LYMPHOCYTES # BLD AUTO: 1.55 K/UL (ref 1–4.8)
MCH RBC QN AUTO: 30.4 PG (ref 27–31)
MCHC RBC AUTO-ENTMCNC: 32.5 G/DL (ref 32–36)
MCV RBC AUTO: 94 FL (ref 82–98)
NUCLEATED RBC (/100WBC) (OHS): 0 /100 WBC
PLATELET # BLD AUTO: 243 K/UL (ref 150–450)
PMV BLD AUTO: 13.8 FL (ref 9.2–12.9)
RBC # BLD AUTO: 4.24 M/UL (ref 4–5.4)
RELATIVE EOSINOPHIL (OHS): 2.3 %
RELATIVE LYMPHOCYTE (OHS): 19.9 % (ref 18–48)
RELATIVE MONOCYTE (OHS): 10.4 % (ref 4–15)
RELATIVE NEUTROPHIL (OHS): 66.5 % (ref 38–73)
WBC # BLD AUTO: 7.79 K/UL (ref 3.9–12.7)

## 2025-07-16 PROCEDURE — 36415 COLL VENOUS BLD VENIPUNCTURE: CPT | Mod: PN

## 2025-07-16 PROCEDURE — 85025 COMPLETE CBC W/AUTO DIFF WBC: CPT

## 2025-07-16 PROCEDURE — 84466 ASSAY OF TRANSFERRIN: CPT

## 2025-07-16 PROCEDURE — 82728 ASSAY OF FERRITIN: CPT

## 2025-07-16 PROCEDURE — 99999 PR PBB SHADOW E&M-EST. PATIENT-LVL V: CPT | Mod: PBBFAC,,, | Performed by: STUDENT IN AN ORGANIZED HEALTH CARE EDUCATION/TRAINING PROGRAM

## 2025-07-16 PROCEDURE — 80053 COMPREHEN METABOLIC PANEL: CPT

## 2025-07-16 PROCEDURE — 84403 ASSAY OF TOTAL TESTOSTERONE: CPT

## 2025-07-16 PROCEDURE — 84443 ASSAY THYROID STIM HORMONE: CPT

## 2025-07-16 PROCEDURE — 83735 ASSAY OF MAGNESIUM: CPT

## 2025-07-16 PROCEDURE — 82607 VITAMIN B-12: CPT

## 2025-07-16 PROCEDURE — 82306 VITAMIN D 25 HYDROXY: CPT

## 2025-07-16 NOTE — CONSULTS
[facility] NEUROLOG[facility]  Response for E-Consult     Patient Name: Kiya Renae  MRN: [id number]  Primary Care Provider: Nicol Shell MD   Requesting Provider: Nicol Shell MD  Consults    Recommendation:      If no contraindications:  -Ok to try Ubrelvy.  -Elavil 10 mg nightly is an alternative as well.    Additional future steps to consider:     Total time of Consultation: 15 minute    I did not speak to the requesting provider verbally about this.     *This eConsult is based on the clinical data available to me and is furnished without benefit of a physical examination. The eConsult will need to be interpreted in light of any clinical issues or changes in patient status not available to me at the time of filing this eConsults. Significant changes in patient condition or level of acuity should result in immediate formal consultation and reevaluation. Please alert me if you have further questions.    Thank you for this eConsult referral.     Suleman Marshall MD  [facility] NEUROLOGY

## 2025-07-16 NOTE — PROGRESS NOTES
Kiya Renae  2000        Subjective     Chief Complaint:     History of Present Illness:  Ms. Kiya Renae is a 25 y.o. female who presents to clinic for annual.    Crohn's-managed by GI in [place].  Psoriatic arthritis- on Skyrizi. Sees Rheum.    Migraines- sees Neuro. On Topiramate but only as needed, if takes daily causes bothersome fatigue.   Recently had a migraine behind her left eye. Saw Optometry. Orbital CT neg.  Bad SE with steroid packs, prefers not to take if not needed. No vision change.  Uses Nurtec prn. Not helping this time. Ibuprofen not helping.  Referred to [facility] clinic.    +tremor. On Propranolol 80 mg XR.  Recent MRI brain demyelinating neg.  Hx of Lyme.    More facial hair growth.   Cycles normal.  No acne.   No weight gain.    Murmer- echo 2023 normal.  No palpitations.      BP Readings from Last 5 Encounters:   07/16/25 120/88   02/25/25 113/70   02/18/25 125/73   01/15/25 106/68   11/05/24 122/72     Due for OBGYN/pap smear. Has not had Gardasil.      Review of Systems   Constitutional:  Negative for chills, fever and malaise/fatigue.   Respiratory:  Negative for shortness of breath.    Musculoskeletal:  Negative for joint pain.   Neurological:  Negative for sensory change, speech change and focal weakness.   Psychiatric/Behavioral:  The patient is not nervous/anxious.         PAST HISTORY:     Past Medical History:   Diagnosis Date    Allergy     Anxiety 07/31/2021    Arthritis 12/30/2021    PsA    Crohn's disease with rectal bleeding 7/16/2025    IBD (inflammatory bowel disease)     Joint pain     Lyme disease, unspecified     Psoriasis     Skin disease 12/30/2021    psorias on scalp, behind ears, and inside ear canal    Subclinical hypothyroidism 07/05/2023       Past Surgical History:   Procedure Laterality Date    COLONOSCOPY N/A 08/22/2023    Procedure: COLONOSCOPY;  Surgeon: Stanton Ferrell MD;  Location: [facility];  Service: Endoscopy;  Laterality: N/A;    COLONOSCOPY  N/A 6/25/2024    Procedure: COLONOSCOPY;  Surgeon: Jose Yoon MD;  Location: [facility] ENDO (4TH FLR);  Service: Endoscopy;  Laterality: N/A;    ESOPHAGOGASTRODUODENOSCOPY N/A 6/25/2024    Procedure: EGD (ESOPHAGOGASTRODUODENOSCOPY);  Surgeon: Jose Yoon MD;  Location: [facility] ENDO (4TH FLR);  Service: Endoscopy;  Laterality: N/A;  urgent case/ per np a labat / ok to add per alfonso/ prep instructions given in clinic / peg prep  6/24-precall complete, pt notified of earlier arrival time (12:20pm)-KPvt    INTRALUMINAL GASTROINTESTINAL TRACT IMAGING VIA CAPSULE N/A 9/9/2024    Procedure: IMAGING PROCEDURE, GI TRACT, INTRALUMINAL, VIA CAPSULE;  Surgeon: Familia Jackson, First Available;  Location: [facility] (4TH FLR);  Service: Endoscopy;  Laterality: N/A;  PERSON/PORTAL-MIRALAX-DW  8/30-lvm for pre call-tb  9/6-lvm for precall-Kpvt    UPPER GASTROINTESTINAL ENDOSCOPY  05/2023    in [place], minor swelling to lower esophagus per patient report       Family History   Problem Relation Name Age of Onset    Alcohol abuse Mother Olga Renae     Asthma Mother Olga Renae     Rheum arthritis Mother Olga Renae     Psoriasis Mother Olga Renae     Alcohol abuse Father Adrián Renae Jr     Migraines Father Adrián Renae Jr     Acne Father Adrián Renae Jr     Depression Brother Omari Renae     Suicidality Brother Omari Renae     Eczema Brother Omari Renae     Crohn's disease Paternal Aunt Jennifer Cranor     Breast cancer Paternal Aunt Jennifer Cranor     Lupus Paternal Aunt Jennifer Cranor     Migraines Paternal Aunt Jennifer Cranor     Diabetes Maternal Grandmother      Heart failure Maternal Grandfather      Breast cancer Paternal Grandmother      Heart attack Paternal Grandmother      Colon cancer Neg Hx      Esophageal cancer Neg Hx      Stomach cancer Neg Hx      Glaucoma Neg Hx      Macular degeneration Neg Hx           MEDICATIONS & ALLERGIES:     Current Outpatient Medications on File Prior to Visit   Medication Sig    azelastine  (ASTELIN) 137 mcg (0.1 %) nasal spray 1 spray (137 mcg total) by Nasal route 2 (two) times daily.    clobetasoL (TEMOVATE) 0.05 % external solution Apply topically once daily. Use to affected areas for up to 2 weeks then take a 1 week break or decrease to 3 times weekly. Do not apply to groin or face    dicyclomine (BENTYL) 20 mg tablet Take 1 tablet (20 mg total) by mouth every 8 (eight) hours as needed (abdominal cramping).    ergocalciferol (VITAMIN D2) 50,000 unit Cap Take 1 capsule (50,000 Units total) by mouth every 7 days.    fluocinolone acetonide oiL 0.01 % Drop Place 1 application in ear(s) once daily. Use to affected areas for up to 2 weeks then take a 1 week break or decrease to 3 times weekly. For use in ears    fluticasone propionate (FLONASE) 50 mcg/actuation nasal spray 2 sprays (100 mcg total) by Each Nostril route once daily.    gabapentin (NEURONTIN) 100 MG capsule Take 100 mg by mouth daily as needed.    hydrocortisone (ANUSOL-HC) 2.5 % rectal cream Place rectally 2 (two) times daily.    ibuprofen (ADVIL,MOTRIN) 800 MG tablet Take 1 tablet (800 mg total) by mouth every 6 (six) hours as needed for Pain.    ondansetron (ZOFRAN) 8 MG tablet Take 8 mg by mouth every 8 (eight) hours as needed for Nausea.    pantoprazole (PROTONIX) 40 MG tablet Take 1 tablet (40 mg total) by mouth once daily.    polyethylene glycol (GLYCOLAX) 17 gram/dose powder Take 17 g by mouth once daily. Mix with 8 oz of water/liquid.    propranoloL (INDERAL LA) 80 MG 24 hr capsule Take 1 capsule (80 mg total) by mouth once daily.    rimegepant (NURTEC) 75 mg odt Take 75 mg by mouth once as needed for Migraine. Place ODT tablet on the tongue; alternatively the ODT tablet may be placed under the tongue    sertraline (ZOLOFT) 100 MG tablet Take 100 mg by mouth.    sertraline 200 mg Cap     SKYRIZI 360 mg/2.4 mL (150 mg/mL) Injt Inject 360 mg into the skin. Every 8 weeks    topiramate (TOPAMAX) 25 MG tablet Take 1 tablet (25 mg  "total) by mouth once daily.    tretinoin (RETIN-A) 0.05 % cream Apply topically every evening. Start twice weekly and increase as tolerated. Pea sized amount to entire face.     Current Facility-Administered Medications on File Prior to Visit   Medication    triamcinolone acetonide injection 10 mg       Review of patient's allergies indicates:   Allergen Reactions    Azithromycin Hives, Rash and Swelling       OBJECTIVE:     Vital Signs:  Vitals:    07/16/25 1359   BP: 120/88   BP Location: Left arm   Patient Position: Sitting   Pulse: 63   SpO2: 100%   Weight: 76.1 kg (167 lb 12.3 oz)   Height: 5' 8" (1.727 m)       Body mass index is 25.51 kg/m².     Physical Exam:  Physical Exam  Vitals and nursing note reviewed.   Constitutional:       General: She is not in acute distress.     Appearance: Normal appearance. She is not ill-appearing, toxic-appearing or diaphoretic.   HENT:      Head: Normocephalic and atraumatic.      Right Ear: Tympanic membrane, ear canal and external ear normal.      Left Ear: Tympanic membrane, ear canal and external ear normal.      Mouth/Throat:      Mouth: Mucous membranes are moist.      Pharynx: No oropharyngeal exudate or posterior oropharyngeal erythema.      Comments: Tongue blue, drank blue Celcius drink   Eyes:      General: No scleral icterus.        Right eye: No discharge.         Left eye: No discharge.      Conjunctiva/sclera: Conjunctivae normal.   Cardiovascular:      Rate and Rhythm: Normal rate and regular rhythm.      Pulses: Normal pulses.      Heart sounds: Murmur heard.   Pulmonary:      Effort: Pulmonary effort is normal. No respiratory distress.      Breath sounds: Normal breath sounds. No wheezing.   Abdominal:      Tenderness: There is no right CVA tenderness or left CVA tenderness.   Musculoskeletal:         General: Normal range of motion.      Cervical back: Normal range of motion and neck supple. No rigidity or tenderness.      Right lower leg: No edema.      " "Left lower leg: No edema.   Lymphadenopathy:      Cervical: No cervical adenopathy.   Skin:     General: Skin is warm and dry.   Neurological:      Mental Status: She is alert and oriented to person, place, and time. Mental status is at baseline.      Gait: Gait normal.   Psychiatric:         Mood and Affect: Mood normal.         Behavior: Behavior normal.            Laboratory  Lab Results   Component Value Date    GLU 65 (L) 01/15/2025     01/15/2025    K 4.0 01/15/2025     01/15/2025    CO2 25 01/15/2025    BUN 18 01/15/2025    CREATININE 0.9 01/15/2025    CALCIUM 9.1 01/15/2025    MG 2.0 09/05/2023     Lab Results   Component Value Date    HGBA1C 5.1 01/15/2025     No results for input(s): "POCTGLUCOSE" in the last 72 hours.        ASSESSMENT & PLAN:   Ms. Kiya Renae is a 25 y.o. female who was seen today in clinic for annual.   Consider HPV vaccine, hx of wartz.  Due for Pap smear.  Labs today per request.  E consult to Neuro, has been referred to Neuro [facility] clinic already.    1. Annual physical exam  -     Ambulatory referral/consult to Obstetrics / Gynecology  -     CBC Auto Differential; Future; Expected date: 07/16/2025  -     Comprehensive Metabolic Panel; Future; Expected date: 07/16/2025  -     TSH; Future; Expected date: 07/16/2025  -     Vitamin D; Future; Expected date: 07/16/2025  -     Vitamin B12; Future; Expected date: 07/16/2025  -     Magnesium; Future; Expected date: 07/16/2025  -     Ferritin; Future; Expected date: 08/12/2025  -     Iron and TIBC; Future; Expected date: 08/12/2025    2. Abnormal facial hair  -     DHEA-Sulfate; Future; Expected date: 07/16/2025  -     TESTOSTERONE; Future; Expected date: 07/16/2025    3. Crohn's disease with rectal bleeding, unspecified gastrointestinal tract location  -     CBC Auto Differential; Future; Expected date: 07/16/2025  -     Comprehensive Metabolic Panel; Future; Expected date: 07/16/2025  -     TSH; Future; Expected date: " 07/16/2025  -     Vitamin D; Future; Expected date: 07/16/2025  -     Vitamin B12; Future; Expected date: 07/16/2025  -     Magnesium; Future; Expected date: 07/16/2025  -     Ferritin; Future; Expected date: 08/12/2025  -     Iron and TIBC; Future; Expected date: 08/12/2025    4. Psoriatic arthritis  -     CBC Auto Differential; Future; Expected date: 07/16/2025  -     Comprehensive Metabolic Panel; Future; Expected date: 07/16/2025  -     TSH; Future; Expected date: 07/16/2025  -     Vitamin D; Future; Expected date: 07/16/2025  -     Vitamin B12; Future; Expected date: 07/16/2025  -     Magnesium; Future; Expected date: 07/16/2025  -     Ferritin; Future; Expected date: 08/12/2025  -     Iron and TIBC; Future; Expected date: 08/12/2025    5. Immunocompromised state  -     CBC Auto Differential; Future; Expected date: 07/16/2025  -     Comprehensive Metabolic Panel; Future; Expected date: 07/16/2025  -     TSH; Future; Expected date: 07/16/2025  -     Vitamin D; Future; Expected date: 07/16/2025  -     Vitamin B12; Future; Expected date: 07/16/2025  -     Magnesium; Future; Expected date: 07/16/2025  -     Ferritin; Future; Expected date: 08/12/2025  -     Iron and TIBC; Future; Expected date: 08/12/2025    6. H/o Lyme disease  -     CBC Auto Differential; Future; Expected date: 07/16/2025  -     Comprehensive Metabolic Panel; Future; Expected date: 07/16/2025  -     TSH; Future; Expected date: 07/16/2025  -     Vitamin D; Future; Expected date: 07/16/2025  -     Vitamin B12; Future; Expected date: 07/16/2025  -     Magnesium; Future; Expected date: 07/16/2025  -     Ferritin; Future; Expected date: 08/12/2025  -     Iron and TIBC; Future; Expected date: 08/12/2025    7. Migraine without aura and without status migrainosus, not intractable  -     CBC Auto Differential; Future; Expected date: 07/16/2025  -     Comprehensive Metabolic Panel; Future; Expected date: 07/16/2025  -     TSH; Future; Expected date:  07/16/2025  -     Vitamin D; Future; Expected date: 07/16/2025  -     Vitamin B12; Future; Expected date: 07/16/2025  -     Magnesium; Future; Expected date: 07/16/2025  -     Ferritin; Future; Expected date: 08/12/2025  -     Iron and TIBC; Future; Expected date: 08/12/2025  -     E-Consult to General Neurology    8. Tremor  -     CBC Auto Differential; Future; Expected date: 07/16/2025  -     Comprehensive Metabolic Panel; Future; Expected date: 07/16/2025  -     TSH; Future; Expected date: 07/16/2025  -     Vitamin D; Future; Expected date: 07/16/2025  -     Vitamin B12; Future; Expected date: 07/16/2025  -     Magnesium; Future; Expected date: 07/16/2025  -     Ferritin; Future; Expected date: 08/12/2025  -     Iron and TIBC; Future; Expected date: 08/12/2025    9. Murmur  -     Echo; Future    10. Other long term (current) drug therapy  -     CBC Auto Differential; Future; Expected date: 07/16/2025  -     Comprehensive Metabolic Panel; Future; Expected date: 07/16/2025  -     TSH; Future; Expected date: 07/16/2025  -     Vitamin D; Future; Expected date: 07/16/2025  -     Vitamin B12; Future; Expected date: 07/16/2025  -     Magnesium; Future; Expected date: 07/16/2025  -     Ferritin; Future; Expected date: 08/12/2025  -     Iron and TIBC; Future; Expected date: 08/12/2025           Nicol Shell MD

## 2025-07-17 ENCOUNTER — PATIENT MESSAGE (OUTPATIENT)
Dept: PRIMARY CARE CLINIC | Facility: CLINIC | Age: 25
End: 2025-07-17
Payer: COMMERCIAL

## 2025-07-17 LAB
25(OH)D3+25(OH)D2 SERPL-MCNC: 42 NG/ML (ref 30–96)
ALBUMIN SERPL BCP-MCNC: 4.3 G/DL (ref 3.5–5.2)
ALP SERPL-CCNC: 47 UNIT/L (ref 40–150)
ALT SERPL W/O P-5'-P-CCNC: 11 UNIT/L (ref 10–44)
ANION GAP (OHS): 7 MMOL/L (ref 8–16)
AST SERPL-CCNC: 16 UNIT/L (ref 11–45)
BILIRUB SERPL-MCNC: 0.3 MG/DL (ref 0.1–1)
BUN SERPL-MCNC: 13 MG/DL (ref 6–20)
CALCIUM SERPL-MCNC: 9.3 MG/DL (ref 8.7–10.5)
CHLORIDE SERPL-SCNC: 109 MMOL/L (ref 95–110)
CO2 SERPL-SCNC: 26 MMOL/L (ref 23–29)
CREAT SERPL-MCNC: 1 MG/DL (ref 0.5–1.4)
FERRITIN SERPL-MCNC: 32 NG/ML (ref 20–300)
GFR SERPLBLD CREATININE-BSD FMLA CKD-EPI: >60 ML/MIN/1.73/M2
GLUCOSE SERPL-MCNC: 90 MG/DL (ref 70–110)
IRON SATN MFR SERPL: 16 % (ref 20–50)
IRON SERPL-MCNC: 66 UG/DL (ref 30–160)
MAGNESIUM SERPL-MCNC: 2 MG/DL (ref 1.6–2.6)
POTASSIUM SERPL-SCNC: 4.3 MMOL/L (ref 3.5–5.1)
PROT SERPL-MCNC: 7.1 GM/DL (ref 6–8.4)
SODIUM SERPL-SCNC: 142 MMOL/L (ref 136–145)
TESTOST SERPL-MCNC: 31 NG/DL (ref 5–73)
TIBC SERPL-MCNC: 407 UG/DL (ref 250–450)
TRANSFERRIN SERPL-MCNC: 275 MG/DL (ref 200–375)
TSH SERPL-ACNC: 1.96 UIU/ML (ref 0.4–4)
VIT B12 SERPL-MCNC: 1262 PG/ML (ref 210–950)

## 2025-07-30 ENCOUNTER — HOSPITAL ENCOUNTER (OUTPATIENT)
Dept: CARDIOLOGY | Facility: HOSPITAL | Age: 25
Discharge: HOME | End: 2025-07-30
Attending: STUDENT IN AN ORGANIZED HEALTH CARE EDUCATION/TRAINING PROGRAM
Payer: COMMERCIAL

## 2025-07-30 VITALS
BODY MASS INDEX: 26.3 KG/M2 | WEIGHT: 167.56 LBS | HEART RATE: 63 BPM | HEIGHT: 67 IN | SYSTOLIC BLOOD PRESSURE: 120 MMHG | DIASTOLIC BLOOD PRESSURE: 88 MMHG

## 2025-07-30 DIAGNOSIS — R01.1 MURMUR: ICD-10-CM

## 2025-07-30 LAB
AORTIC SIZE INDEX (SOV): 1.3 CM/M2
AORTIC SIZE INDEX: 1.4 CM/M2
ASCENDING AORTA: 2.7 CM
AV AREA BY CONTINUOUS VTI: 2.7 CM2
AV INDEX (PROSTH): 0.77
AV LVOT MEAN GRADIENT: 2 MMHG
AV LVOT PEAK GRADIENT: 3 MMHG
AV MEAN GRADIENT: 3 MMHG
AV PEAK GRADIENT: 5 MMHG
AV VALVE AREA BY VELOCITY RATIO: 2.8 CM²
AV VALVE AREA: 2.7 CM2
AV VELOCITY RATIO: 0.82
BSA FOR ECHO PROCEDURE: 1.89 M2
CV ECHO LV RWT: 0.28 CM
DOP CALC AO PEAK VEL: 1.1 M/S
DOP CALC AO VTI: 27.7 CM
DOP CALC LVOT AREA: 3.5 CM2
DOP CALC LVOT DIAMETER: 2.1 CM
DOP CALC LVOT PEAK VEL: 0.9 M/S
DOP CALCLVOT PEAK VEL VTI: 21.4 CM
E WAVE DECELERATION TIME: 177 MS
E/A RATIO: 2.89
E/E' RATIO: 6 M/S
ECHO EF ESTIMATED: 60 %
ECHO LV POSTERIOR WALL: 0.7 CM (ref 0.6–1.1)
FRACTIONAL SHORTENING: 32 % (ref 28–44)
INTERVENTRICULAR SEPTUM: 0.7 CM (ref 0.6–1.1)
IVC DIAMETER: 1.62 CM
IVRT: 97 MS
LA MAJOR: 3.9 CM
LA MINOR: 4.4 CM
LA WIDTH: 4.1 CM
LEFT ATRIUM SIZE: 3 CM
LEFT ATRIUM VOLUME INDEX MOD: 21 ML/M2
LEFT ATRIUM VOLUME INDEX: 23 ML/M2
LEFT ATRIUM VOLUME MOD: 39 ML
LEFT ATRIUM VOLUME: 43 CM3
LEFT INTERNAL DIMENSION IN SYSTOLE: 3.4 CM (ref 2.1–4)
LEFT VENTRICLE DIASTOLIC VOLUME INDEX: 62.57 ML/M2
LEFT VENTRICLE DIASTOLIC VOLUME: 117 ML
LEFT VENTRICLE MASS INDEX: 61.3 G/M2
LEFT VENTRICLE SYSTOLIC VOLUME INDEX: 25.1 ML/M2
LEFT VENTRICLE SYSTOLIC VOLUME: 47 ML
LEFT VENTRICULAR INTERNAL DIMENSION IN DIASTOLE: 5 CM (ref 3.5–6)
LEFT VENTRICULAR MASS: 114.7 G
LV LATERAL E/E' RATIO: 4.8
LV SEPTAL E/E' RATIO: 6.7
Lab: 1.5 CM/M
Lab: 1.6 CM/M
MV A" WAVE DURATION": 176.97 MS
MV PEAK A VEL: 0.35 M/S
MV PEAK E VEL: 1.01 M/S
OHS CV CPX PATIENT HEIGHT IN: 66.93
OHS CV RV/LV RATIO: 0.66 CM
PISA TR MAX VEL: 2.4 M/S
PULM VEIN A" WAVE DURATION": 176.97 MS
PULM VEIN S/D RATIO: 0.67
PULMONIC VEIN PEAK A VELOCITY: 0.2 M/S
PV PEAK D VEL: 0.88 M/S
PV PEAK S VEL: 0.59 M/S
RA MAJOR: 4.38 CM
RA PRESSURE ESTIMATED: 3 MMHG
RA WIDTH: 3.77 CM
RIGHT ATRIAL AREA: 14.2 CM2
RIGHT VENTRICLE DIASTOLIC BASEL DIMENSION: 3.3 CM
RV TB RVSP: 5 MMHG
RV TISSUE DOPPLER FREE WALL SYSTOLIC VELOCITY 1 (APICAL 4 CHAMBER VIEW): 14.22 CM/S
SINUS: 2.5 CM
STJ: 2.4 CM
TDI LATERAL: 0.21 M/S
TDI SEPTAL: 0.15 M/S
TDI: 0.18 M/S
TRICUSPID ANNULAR PLANE SYSTOLIC EXCURSION: 2.5 CM
TV PEAK GRADIENT: 24 MMHG
TV REST PULMONARY ARTERY PRESSURE: 26 MMHG
Z-SCORE OF LEFT VENTRICULAR DIMENSION IN END DIASTOLE: -0.42
Z-SCORE OF LEFT VENTRICULAR DIMENSION IN END SYSTOLE: 0.44

## 2025-07-30 PROCEDURE — 93306 TTE W/DOPPLER COMPLETE: CPT

## 2025-07-30 PROCEDURE — 93306 TTE W/DOPPLER COMPLETE: CPT | Mod: 26,,, | Performed by: STUDENT IN AN ORGANIZED HEALTH CARE EDUCATION/TRAINING PROGRAM
